# Patient Record
Sex: FEMALE | Race: WHITE | NOT HISPANIC OR LATINO | URBAN - METROPOLITAN AREA
[De-identification: names, ages, dates, MRNs, and addresses within clinical notes are randomized per-mention and may not be internally consistent; named-entity substitution may affect disease eponyms.]

---

## 2019-01-22 ENCOUNTER — EMERGENCY (EMERGENCY)
Facility: HOSPITAL | Age: 84
LOS: 1 days | Discharge: HOME | End: 2019-01-22
Attending: EMERGENCY MEDICINE
Payer: MEDICARE

## 2019-01-22 VITALS
RESPIRATION RATE: 18 BRPM | HEART RATE: 80 BPM | SYSTOLIC BLOOD PRESSURE: 120 MMHG | DIASTOLIC BLOOD PRESSURE: 64 MMHG | OXYGEN SATURATION: 97 % | TEMPERATURE: 98 F

## 2019-01-22 PROCEDURE — 74018 RADEX ABDOMEN 1 VIEW: CPT | Mod: 26

## 2019-01-22 PROCEDURE — 99283 EMERGENCY DEPT VISIT LOW MDM: CPT

## 2019-01-22 NOTE — ED ADULT TRIAGE NOTE - CHIEF COMPLAINT QUOTE
via ambulance sent from [facility] for peg replacement , peg tube leaking

## 2019-01-22 NOTE — ED ADULT NURSE NOTE - NSIMPLEMENTINTERV_GEN_ALL_ED
Implemented All Fall with Harm Risk Interventions:  [place] to call system. Call bell, personal items and telephone within reach. Instruct patient to call for assistance. Room bathroom lighting operational. Non-slip footwear when patient is off stretcher. Physically safe environment: no spills, clutter or unnecessary equipment. Stretcher in lowest position, wheels locked, appropriate side rails in place. Provide visual cue, wrist band, yellow gown, etc. Monitor gait and stability. Monitor for mental status changes and reorient to person, place, and time. Review medications for side effects contributing to fall risk. Reinforce activity limits and safety measures with patient and family. Provide visual clues: red socks.

## 2019-01-22 NOTE — ED ADULT NURSE NOTE - ED STAT RN HANDOFF DETAILS
Patient discharge back to [facility] as per MD order, [facility] ambulance here to pick patient up. Patient being transported via stretcher stable in no acute distress safety maintained.

## 2019-01-22 NOTE — ED ADULT NURSE NOTE - CHIEF COMPLAINT QUOTE
via ambulance sent from [facility] for peg replacement , peg tube leaking

## 2019-01-22 NOTE — ED PROVIDER NOTE - MUSCULOSKELETAL, MLM
Spine appears normal, range of motion is limited, rosette legs /Rt arm contracture, Lt heel- stage II ulcer

## 2019-01-22 NOTE — ED ADULT NURSE NOTE - NS PRO AD PATIENT TYPE
Medical Orders for Life-Sustaining Treatment (MOLST)/Do Not Resuscitate (DNR)/not on file/Health Care Proxy (HCP)

## 2019-01-24 ENCOUNTER — INPATIENT (INPATIENT)
Facility: HOSPITAL | Age: 84
LOS: 3 days | Discharge: NURSING, REHAB OR LONG-TERM CARE | DRG: 205 | End: 2019-01-28
Attending: INTERNAL MEDICINE | Admitting: INTERNAL MEDICINE
Payer: MEDICARE

## 2019-01-24 VITALS
HEART RATE: 84 BPM | WEIGHT: 179.9 LBS | HEIGHT: 62 IN | OXYGEN SATURATION: 96 % | RESPIRATION RATE: 20 BRPM | SYSTOLIC BLOOD PRESSURE: 100 MMHG | DIASTOLIC BLOOD PRESSURE: 70 MMHG

## 2019-01-24 DIAGNOSIS — Z29.9 ENCOUNTER FOR PROPHYLACTIC MEASURES, UNSPECIFIED: ICD-10-CM

## 2019-01-24 DIAGNOSIS — F03.90 UNSPECIFIED DEMENTIA WITHOUT BEHAVIORAL DISTURBANCE: ICD-10-CM

## 2019-01-24 DIAGNOSIS — E78.5 HYPERLIPIDEMIA, UNSPECIFIED: ICD-10-CM

## 2019-01-24 DIAGNOSIS — J18.9 PNEUMONIA, UNSPECIFIED ORGANISM: ICD-10-CM

## 2019-01-24 DIAGNOSIS — J96.00 ACUTE RESPIRATORY FAILURE, UNSPECIFIED WHETHER WITH HYPOXIA OR HYPERCAPNIA: ICD-10-CM

## 2019-01-24 PROBLEM — I10 ESSENTIAL (PRIMARY) HYPERTENSION: Chronic | Status: ACTIVE | Noted: 2019-01-22

## 2019-01-24 LAB
ALBUMIN SERPL ELPH-MCNC: 2.2 G/DL — LOW (ref 3.5–5)
ALP SERPL-CCNC: 121 U/L — HIGH (ref 40–120)
ALT FLD-CCNC: 34 U/L DA — SIGNIFICANT CHANGE UP (ref 10–60)
ANION GAP SERPL CALC-SCNC: 8 MMOL/L — SIGNIFICANT CHANGE UP (ref 5–17)
AST SERPL-CCNC: 30 U/L — SIGNIFICANT CHANGE UP (ref 10–40)
BASE EXCESS BLDV CALC-SCNC: 5.8 MMOL/L — HIGH (ref -2–2)
BASOPHILS # BLD AUTO: 0.1 K/UL — SIGNIFICANT CHANGE UP (ref 0–0.2)
BASOPHILS NFR BLD AUTO: 0.6 % — SIGNIFICANT CHANGE UP (ref 0–2)
BILIRUB SERPL-MCNC: 0.5 MG/DL — SIGNIFICANT CHANGE UP (ref 0.2–1.2)
BUN SERPL-MCNC: 35 MG/DL — HIGH (ref 7–18)
CALCIUM SERPL-MCNC: 7.8 MG/DL — LOW (ref 8.4–10.5)
CHLORIDE SERPL-SCNC: 112 MMOL/L — HIGH (ref 96–108)
CK MB BLD-MCNC: 1.1 % — SIGNIFICANT CHANGE UP (ref 0–3.5)
CK MB CFR SERPL CALC: 2.2 NG/ML — SIGNIFICANT CHANGE UP (ref 0–3.6)
CK SERPL-CCNC: 203 U/L — SIGNIFICANT CHANGE UP (ref 21–215)
CO2 SERPL-SCNC: 25 MMOL/L — SIGNIFICANT CHANGE UP (ref 22–31)
CREAT SERPL-MCNC: 0.53 MG/DL — SIGNIFICANT CHANGE UP (ref 0.5–1.3)
EOSINOPHIL # BLD AUTO: 0 K/UL — SIGNIFICANT CHANGE UP (ref 0–0.5)
EOSINOPHIL NFR BLD AUTO: 0.1 % — SIGNIFICANT CHANGE UP (ref 0–6)
FLU A RESULT: SIGNIFICANT CHANGE UP
FLU A RESULT: SIGNIFICANT CHANGE UP
FLUAV AG NPH QL: SIGNIFICANT CHANGE UP
FLUBV AG NPH QL: SIGNIFICANT CHANGE UP
GLUCOSE SERPL-MCNC: 104 MG/DL — HIGH (ref 70–99)
HCO3 BLDV-SCNC: 30 MMOL/L — HIGH (ref 21–29)
HCT VFR BLD CALC: 36.3 % — SIGNIFICANT CHANGE UP (ref 34.5–45)
HGB BLD-MCNC: 11.6 G/DL — SIGNIFICANT CHANGE UP (ref 11.5–15.5)
HOROWITZ INDEX BLDV+IHG-RTO: 21 — SIGNIFICANT CHANGE UP
LYMPHOCYTES # BLD AUTO: 1.2 K/UL — SIGNIFICANT CHANGE UP (ref 1–3.3)
LYMPHOCYTES # BLD AUTO: 9.3 % — LOW (ref 13–44)
MCHC RBC-ENTMCNC: 29.5 PG — SIGNIFICANT CHANGE UP (ref 27–34)
MCHC RBC-ENTMCNC: 31.8 GM/DL — LOW (ref 32–36)
MCV RBC AUTO: 92.7 FL — SIGNIFICANT CHANGE UP (ref 80–100)
MONOCYTES # BLD AUTO: 1.1 K/UL — HIGH (ref 0–0.9)
MONOCYTES NFR BLD AUTO: 8.6 % — SIGNIFICANT CHANGE UP (ref 2–14)
NEUTROPHILS # BLD AUTO: 10.2 K/UL — HIGH (ref 1.8–7.4)
NEUTROPHILS NFR BLD AUTO: 81.5 % — HIGH (ref 43–77)
PCO2 BLDV: 41 MMHG — SIGNIFICANT CHANGE UP (ref 35–50)
PH BLDV: 7.47 — HIGH (ref 7.35–7.45)
PLATELET # BLD AUTO: 244 K/UL — SIGNIFICANT CHANGE UP (ref 150–400)
PO2 BLDV: 89 MMHG — HIGH (ref 25–45)
POTASSIUM SERPL-MCNC: 4.2 MMOL/L — SIGNIFICANT CHANGE UP (ref 3.5–5.3)
POTASSIUM SERPL-SCNC: 4.2 MMOL/L — SIGNIFICANT CHANGE UP (ref 3.5–5.3)
PROT SERPL-MCNC: 7 G/DL — SIGNIFICANT CHANGE UP (ref 6–8.3)
RBC # BLD: 3.92 M/UL — SIGNIFICANT CHANGE UP (ref 3.8–5.2)
RBC # FLD: 14.1 % — SIGNIFICANT CHANGE UP (ref 10.3–14.5)
RSV RESULT: SIGNIFICANT CHANGE UP
RSV RNA RESP QL NAA+PROBE: SIGNIFICANT CHANGE UP
SAO2 % BLDV: 97 % — HIGH (ref 67–88)
SODIUM SERPL-SCNC: 145 MMOL/L — SIGNIFICANT CHANGE UP (ref 135–145)
TROPONIN I SERPL-MCNC: 0.03 NG/ML — SIGNIFICANT CHANGE UP (ref 0–0.04)
WBC # BLD: 12.5 K/UL — HIGH (ref 3.8–10.5)
WBC # FLD AUTO: 12.5 K/UL — HIGH (ref 3.8–10.5)

## 2019-01-24 PROCEDURE — 99285 EMERGENCY DEPT VISIT HI MDM: CPT

## 2019-01-24 PROCEDURE — 93010 ELECTROCARDIOGRAM REPORT: CPT

## 2019-01-24 PROCEDURE — 71045 X-RAY EXAM CHEST 1 VIEW: CPT | Mod: 26

## 2019-01-24 RX ORDER — PANTOPRAZOLE SODIUM 20 MG/1
40 TABLET, DELAYED RELEASE ORAL
Qty: 0 | Refills: 0 | Status: DISCONTINUED | OUTPATIENT
Start: 2019-01-24 | End: 2019-01-28

## 2019-01-24 RX ORDER — CITALOPRAM 10 MG/1
10 TABLET, FILM COATED ORAL DAILY
Qty: 0 | Refills: 0 | Status: DISCONTINUED | OUTPATIENT
Start: 2019-01-24 | End: 2019-01-28

## 2019-01-24 RX ORDER — PIPERACILLIN AND TAZOBACTAM 4; .5 G/20ML; G/20ML
3.38 INJECTION, POWDER, LYOPHILIZED, FOR SOLUTION INTRAVENOUS EVERY 8 HOURS
Qty: 0 | Refills: 0 | Status: DISCONTINUED | OUTPATIENT
Start: 2019-01-24 | End: 2019-01-28

## 2019-01-24 RX ORDER — MEMANTINE HYDROCHLORIDE 10 MG/1
10 TABLET ORAL
Qty: 0 | Refills: 0 | Status: DISCONTINUED | OUTPATIENT
Start: 2019-01-24 | End: 2019-01-28

## 2019-01-24 RX ORDER — SIMVASTATIN 20 MG/1
20 TABLET, FILM COATED ORAL AT BEDTIME
Qty: 0 | Refills: 0 | Status: DISCONTINUED | OUTPATIENT
Start: 2019-01-24 | End: 2019-01-28

## 2019-01-24 RX ORDER — AZITHROMYCIN 500 MG/1
500 TABLET, FILM COATED ORAL ONCE
Qty: 0 | Refills: 0 | Status: COMPLETED | OUTPATIENT
Start: 2019-01-24 | End: 2019-01-24

## 2019-01-24 RX ORDER — ENOXAPARIN SODIUM 100 MG/ML
40 INJECTION SUBCUTANEOUS DAILY
Qty: 0 | Refills: 0 | Status: DISCONTINUED | OUTPATIENT
Start: 2019-01-24 | End: 2019-01-28

## 2019-01-24 RX ORDER — PIPERACILLIN AND TAZOBACTAM 4; .5 G/20ML; G/20ML
3.38 INJECTION, POWDER, LYOPHILIZED, FOR SOLUTION INTRAVENOUS ONCE
Qty: 0 | Refills: 0 | Status: DISCONTINUED | OUTPATIENT
Start: 2019-01-24 | End: 2019-01-28

## 2019-01-24 RX ADMIN — SIMVASTATIN 20 MILLIGRAM(S): 20 TABLET, FILM COATED ORAL at 23:51

## 2019-01-24 RX ADMIN — PIPERACILLIN AND TAZOBACTAM 25 GRAM(S): 4; .5 INJECTION, POWDER, LYOPHILIZED, FOR SOLUTION INTRAVENOUS at 23:51

## 2019-01-24 RX ADMIN — AZITHROMYCIN 250 MILLIGRAM(S): 500 TABLET, FILM COATED ORAL at 18:59

## 2019-01-24 NOTE — H&P ADULT - ASSESSMENT
89 yr old F from [facility] bedbound with advanced dementia, Htn, gastric ulcer is sent from NH due to hypoxia of 80s, dyspnea and concern of PNA.

## 2019-01-24 NOTE — H&P ADULT - NSHPPHYSICALEXAM_GEN_ALL_CORE
ICU Vital Signs Last 24 Hrs  T(C): 37.1 (24 Jan 2019 16:54), Max: 37.6 (24 Jan 2019 12:43)  T(F): 98.7 (24 Jan 2019 16:54), Max: 99.6 (24 Jan 2019 12:43)  HR: 88 (24 Jan 2019 16:54) (84 - 88)  BP: 161/79 (24 Jan 2019 16:54) (100/70 - 161/79)  BP(mean): --  ABP: --  ABP(mean): --  RR: 18 (24 Jan 2019 16:54) (18 - 20)  SpO2: 99% (24 Jan 2019 16:54) (96% - 99%)    PHYSICAL EXAM:  GENERAL: in minimal distress, increased work of breathing  HEAD:  Atraumatic, Normocephalic  EYES:  conjunctiva and sclera clear  NECK: Supple, No JVD, Normal thyroid  CHEST/LUNG: Clear to auscultation. Clear to percussion bilaterally; No rales, rhonchi, wheezing, or rubs  HEART: Regular rate and rhythm; No murmurs, rubs, or gallops  ABDOMEN: Soft, Nontender, Nondistended; Bowel sounds present  NERVOUS SYSTEM:  Alert & Oriented X 0    EXTREMITIES:  2+ Peripheral Pulses, No clubbing, cyanosis, or edema  SKIN: warm dry

## 2019-01-24 NOTE — ED PROVIDER NOTE - OBJECTIVE STATEMENT
SOB SOB snet from nursing home for SOB   no fever no chills  has had a cough   also somewhat more lethargic

## 2019-01-24 NOTE — ED ADULT NURSE NOTE - NSIMPLEMENTINTERV_GEN_ALL_ED
Implemented All Universal Safety Interventions:  [place] to call system. Call bell, personal items and telephone within reach. Instruct patient to call for assistance. Room bathroom lighting operational. Non-slip footwear when patient is off stretcher. Physically safe environment: no spills, clutter or unnecessary equipment. Stretcher in lowest position, wheels locked, appropriate side rails in place.

## 2019-01-24 NOTE — ED ADULT NURSE NOTE - OBJECTIVE STATEMENT
pt is sent form [facility] home for increaded WOB - 02 sat is wnl on 3L n/c

## 2019-01-24 NOTE — H&P ADULT - HISTORY OF PRESENT ILLNESS
89 yr old F from [facility] bedbound with advanced dementia., 89 yr old F from [facility] bedbound with advanced dementia, Htn, gastric ulcer is sent from NH due to hypoxia of 80s, dyspnea and concern of PNA. Patient was recently came to [facility] ED for Peg tube change and was discharged. Further hx is limited as patient is AXO=0.    In ED, patient's vital signs were stable, labs were remarkable for WBC:12.5, CXR shows "Nonspecific prominence of the right hilum and mass/lymphadenopathy, no pleural effusion, Atelectasis right lung base. Calcified granuloma left lung base. No pleural effusion. Mild elevation right hemidiaphragm." Patient was saturating 95% on 2L nasal cannula    Goals of care: DNR/ DNI. HCP is ok with Central line and IV pressors.

## 2019-01-24 NOTE — H&P ADULT - PROBLEM SELECTOR PLAN 1
p/w hypoxia to 85%, increased to 95% on nasal cannula 2L likley secondary to Pneumonia/ atelectasis   CXR shows Nonspecific prominence of the right hilum and mass/lymphadenopathy not excluded, does not show any infiltrate  c/w nasal cannula oxygen   will start zosyn for concerns of PNA  F/u Blood cultures

## 2019-01-24 NOTE — ED ADULT NURSE NOTE - ED STAT RN HANDOFF DETAILS 2
received pt.in  bed at 1910 pt. is  awake and responsive. denies pain, transfer to  401 C report given to  marlon [email or web address]   in distress

## 2019-01-25 DIAGNOSIS — N39.0 URINARY TRACT INFECTION, SITE NOT SPECIFIED: ICD-10-CM

## 2019-01-25 LAB
ANION GAP SERPL CALC-SCNC: 6 MMOL/L — SIGNIFICANT CHANGE UP (ref 5–17)
APPEARANCE UR: CLEAR — SIGNIFICANT CHANGE UP
BASOPHILS # BLD AUTO: 0.1 K/UL — SIGNIFICANT CHANGE UP (ref 0–0.2)
BASOPHILS NFR BLD AUTO: 0.5 % — SIGNIFICANT CHANGE UP (ref 0–2)
BILIRUB UR-MCNC: NEGATIVE — SIGNIFICANT CHANGE UP
BUN SERPL-MCNC: 34 MG/DL — HIGH (ref 7–18)
CALCIUM SERPL-MCNC: 8.3 MG/DL — LOW (ref 8.4–10.5)
CHLORIDE SERPL-SCNC: 111 MMOL/L — HIGH (ref 96–108)
CHOLEST SERPL-MCNC: 121 MG/DL — SIGNIFICANT CHANGE UP (ref 10–199)
CO2 SERPL-SCNC: 30 MMOL/L — SIGNIFICANT CHANGE UP (ref 22–31)
COLOR SPEC: YELLOW — SIGNIFICANT CHANGE UP
CREAT SERPL-MCNC: 0.59 MG/DL — SIGNIFICANT CHANGE UP (ref 0.5–1.3)
DIFF PNL FLD: ABNORMAL
EOSINOPHIL # BLD AUTO: 0 K/UL — SIGNIFICANT CHANGE UP (ref 0–0.5)
EOSINOPHIL NFR BLD AUTO: 0.1 % — SIGNIFICANT CHANGE UP (ref 0–6)
FOLATE SERPL-MCNC: >20 NG/ML — SIGNIFICANT CHANGE UP
GLUCOSE SERPL-MCNC: 115 MG/DL — HIGH (ref 70–99)
GLUCOSE UR QL: NEGATIVE — SIGNIFICANT CHANGE UP
HBA1C BLD-MCNC: 5.9 % — HIGH (ref 4–5.6)
HCT VFR BLD CALC: 36.1 % — SIGNIFICANT CHANGE UP (ref 34.5–45)
HDLC SERPL-MCNC: 35 MG/DL — LOW
HGB BLD-MCNC: 11.2 G/DL — LOW (ref 11.5–15.5)
KETONES UR-MCNC: NEGATIVE — SIGNIFICANT CHANGE UP
LEUKOCYTE ESTERASE UR-ACNC: ABNORMAL
LIPID PNL WITH DIRECT LDL SERPL: 67 MG/DL — SIGNIFICANT CHANGE UP
LYMPHOCYTES # BLD AUTO: 1.3 K/UL — SIGNIFICANT CHANGE UP (ref 1–3.3)
LYMPHOCYTES # BLD AUTO: 11.1 % — LOW (ref 13–44)
MAGNESIUM SERPL-MCNC: 2.4 MG/DL — SIGNIFICANT CHANGE UP (ref 1.6–2.6)
MCHC RBC-ENTMCNC: 29.7 PG — SIGNIFICANT CHANGE UP (ref 27–34)
MCHC RBC-ENTMCNC: 31 GM/DL — LOW (ref 32–36)
MCV RBC AUTO: 95.8 FL — SIGNIFICANT CHANGE UP (ref 80–100)
MONOCYTES # BLD AUTO: 1 K/UL — HIGH (ref 0–0.9)
MONOCYTES NFR BLD AUTO: 8.4 % — SIGNIFICANT CHANGE UP (ref 2–14)
NEUTROPHILS # BLD AUTO: 9.5 K/UL — HIGH (ref 1.8–7.4)
NEUTROPHILS NFR BLD AUTO: 79.9 % — HIGH (ref 43–77)
NITRITE UR-MCNC: NEGATIVE — SIGNIFICANT CHANGE UP
PH UR: 6 — SIGNIFICANT CHANGE UP (ref 5–8)
PHOSPHATE SERPL-MCNC: 4.1 MG/DL — SIGNIFICANT CHANGE UP (ref 2.5–4.5)
PLATELET # BLD AUTO: 235 K/UL — SIGNIFICANT CHANGE UP (ref 150–400)
POTASSIUM SERPL-MCNC: 4 MMOL/L — SIGNIFICANT CHANGE UP (ref 3.5–5.3)
POTASSIUM SERPL-SCNC: 4 MMOL/L — SIGNIFICANT CHANGE UP (ref 3.5–5.3)
PROT UR-MCNC: 30 MG/DL
RBC # BLD: 3.77 M/UL — LOW (ref 3.8–5.2)
RBC # FLD: 13.3 % — SIGNIFICANT CHANGE UP (ref 10.3–14.5)
SODIUM SERPL-SCNC: 147 MMOL/L — HIGH (ref 135–145)
SP GR SPEC: 1.01 — SIGNIFICANT CHANGE UP (ref 1.01–1.02)
TOTAL CHOLESTEROL/HDL RATIO MEASUREMENT: 3.5 RATIO — SIGNIFICANT CHANGE UP (ref 3.3–7.1)
TRIGL SERPL-MCNC: 96 MG/DL — SIGNIFICANT CHANGE UP (ref 10–149)
TSH SERPL-MCNC: 3.2 UU/ML — SIGNIFICANT CHANGE UP (ref 0.34–4.82)
UROBILINOGEN FLD QL: NEGATIVE — SIGNIFICANT CHANGE UP
VIT B12 SERPL-MCNC: 1041 PG/ML — SIGNIFICANT CHANGE UP (ref 232–1245)
WBC # BLD: 11.9 K/UL — HIGH (ref 3.8–10.5)
WBC # FLD AUTO: 11.9 K/UL — HIGH (ref 3.8–10.5)

## 2019-01-25 PROCEDURE — 71250 CT THORAX DX C-: CPT | Mod: 26

## 2019-01-25 RX ADMIN — PIPERACILLIN AND TAZOBACTAM 25 GRAM(S): 4; .5 INJECTION, POWDER, LYOPHILIZED, FOR SOLUTION INTRAVENOUS at 21:36

## 2019-01-25 RX ADMIN — PANTOPRAZOLE SODIUM 40 MILLIGRAM(S): 20 TABLET, DELAYED RELEASE ORAL at 05:58

## 2019-01-25 RX ADMIN — CITALOPRAM 10 MILLIGRAM(S): 10 TABLET, FILM COATED ORAL at 13:03

## 2019-01-25 RX ADMIN — PIPERACILLIN AND TAZOBACTAM 25 GRAM(S): 4; .5 INJECTION, POWDER, LYOPHILIZED, FOR SOLUTION INTRAVENOUS at 13:56

## 2019-01-25 RX ADMIN — SIMVASTATIN 20 MILLIGRAM(S): 20 TABLET, FILM COATED ORAL at 21:36

## 2019-01-25 RX ADMIN — MEMANTINE HYDROCHLORIDE 10 MILLIGRAM(S): 10 TABLET ORAL at 18:27

## 2019-01-25 RX ADMIN — MEMANTINE HYDROCHLORIDE 10 MILLIGRAM(S): 10 TABLET ORAL at 05:58

## 2019-01-25 RX ADMIN — ENOXAPARIN SODIUM 40 MILLIGRAM(S): 100 INJECTION SUBCUTANEOUS at 13:03

## 2019-01-25 RX ADMIN — PIPERACILLIN AND TAZOBACTAM 25 GRAM(S): 4; .5 INJECTION, POWDER, LYOPHILIZED, FOR SOLUTION INTRAVENOUS at 05:58

## 2019-01-25 NOTE — ADVANCED PRACTICE NURSE CONSULT - ASSESSMENT
This is a 89yr old female patient admitted for Pneumonia, presenting with the following:  -There is a Stage 1 Pressure Injury to the Coccyx, as evident by non-blanchable erythema  -There is a Stage 3 Pressure injury to the R. Heel (4cm x 4cm) with pink tissue and drainage

## 2019-01-25 NOTE — PROGRESS NOTE ADULT - ASSESSMENT
pt was brought in sob  and low grade fever    seen and examined  vs stable afebrile saturating well on 2 lnc  lungs clinically clear    heart nml abd soft bs nml non tender peg in place  cns very confused  ( chronic)   ct chest negative  blood, urine  cxs pending  advanced dementia  bed bound , peg tube  poor prognosis

## 2019-01-25 NOTE — ADVANCED PRACTICE NURSE CONSULT - RECOMMEDATIONS
-Clean all wounds with normal saline and apply skin prep to the surrounding skin  -Apply Calcium Alginate (Aquacel) to the R. heel wound bed and cover with a Foam dressing Q 72hrs PRN  -Apply a Foam dressing to the Coccyx Q 72hrs PRN  -Elevate/float the patients heels using heel protectors and reposition the patient Q 2hrs using wedges or pillows

## 2019-01-25 NOTE — PATIENT PROFILE ADULT - LAST BOWEL MOVEMENT DATE
Occupational Therapy INPATIENT REHABILITATION Initial Evaluation:     Date: 6/2/2018     Patient is a 71 year old female admitted to Inpatient Rehabilitation Unit at [facility] for acute left frontal parasaggital hemorrhage and right frontal encephalomalacia with subacute right frontal parasagittal hematoma. Patient previously lived alone and was independent with self-cares and mobility. Patient enjoys caring for home including cooking, cleaning and gardening.     ASSESSMENT:   Patient presents to occupational therapy below baseline level of functioning with ADLs and mobility. Patient is demonstrating decreased strength, balance deficits, diminished safety awareness, poor coordination, decreased activity tolerance, postural problems, decreased endurance which is limiting the completion of all ADLs and all functional mobility at this time.  Further skilled occupational therapy is required to address these limitations in attempt to maximize the patient's independence.       Focus of today's therapy session:   Completion of OT evaluation,  ADL training   See below for further details.    Treatment Plan for Next Session:   AM: upper / lower body dressing   PM: toilet transfer / toileting     Precautions:  Fall Risk   Activity: As tolerated and up with assist  Rodriguez Fall Scale Score: 75  Alarms:  Bed alarm activated at end of session    SUBJECTIVE:   Pt. reported agreeable to therapy  Pt's personal goal for therapy: return home    Cognition: Alert and Oriented x4    Pain: Pain Assessment: Within defined limits (06/02/18 0915)  Comfort/Function (Pain) SCORE at Rest: 0 (06/02/18 0915)  Comfort/Function (Pain) SCORE with Activity: 0 (06/02/18 0915)     ADLs:   Eating:  Set-up Assistance  Oral Hygiene:  Set-up Assistance  Bathing:  Maximal Assistance (Max)  Upper Extremity Dressing:  Minimal Assistance (Min)  Lower Extremity Dressing:  Maximal Assistance (Max)  Footwear:  Total Assistance   Patient requiring assistance with  grooming / feeding as limited by right upper extremity strength deficits. Assistance required for bathing as patient limited by impaired sitting balance, decreased safety awareness and right upper extremity strength deficits. Patient completing full body dressing seated on edge of bed, patient requiring minimal assistance for dynamic sitting balance as patient demonstrates right LE extensor tone and leans posteriorly, requiring verbal / tactile cues to correct. Would recommend direct supervision on unsupported surfaces including toilet as patient demonstrates balance deficits as well as decreased ability to flex right knee to support on ground surface.        MOBILITY/EXERCISE:    Bed:   Sit to Supine:  Maximal Assistance (Max)    Transfers:   Sit to Stand:  Moderate Assistance (Mod)  Stand to Sit:  Moderate Assistance (Mod)  Shower:  Walk-in:  side stepping with Moderate Assistance (Mod), of 2  Device Used:  gait belt and 2 wheeled walker  Reason for Assistance:  weakness, verbal cues for hand placement, sequencing, manual cues for right UE placement, unsteadiness    Positioning from writer required for hand placement on walker     ADL Functional Mobility/Ambulation:  Minimal assistance of two to ambulate to bathroom as patient demo's RLE weakness, requiring verbal / tactile cues for advancing right lower extremity with ambulation and verbal cues for sequence     Balance:  Static sitting: Supervision (Supv)  Dynamic sitting: Minimal Assistance (Min)  Static standing:  Minimal Assistance (Min)  Dynamic standing: Moderate Assistance (Mod)    Exercises:  Not addressed this session      Initial IRP Reporting:   Activities of Daily Living:        Eating:  Set-up Assistance       Grooming:  Set-up Assistance       Oral Hygiene:  Set-up Assistance       Toileting:  Patient Refused       Toilet Transfer: Patient Refused       Bathing:  Maximal Assistance (Max)       Walk-in Transfer: Total Assistance       Upper Extremity  Dressing:  Minimal Assistance (Min)       Lower Extremity Dressing:  Maximal Assistance (Max)       Footwear:  Total Assistance     OBSERVATION:   Edema:   none    Vital Signs:   Vital signs stable throughout therapy session    UE ROM (degrees):  Functional Upper Extremity Range of Motion:     Left Right   Date     Place hand on opposite shoulder Yes No   Touch top of head Yes No   Place hand behind neck Yes No   Place hand behind back Yes No   Over head reach Yes No   Comments: Right hand dominant    Strength (out of 5, in available AROM):  UE:     Left  Right    Initial Initial    Shoulder Flexion  4/5 0/5   Elbow Flexion  4/5 1/5   Elbow Extension 4/5 1/5   *Flexor tone observed with elbow flexion / extension    Neurological:  Coordination: impaired RUE  Sensation: intact  Tone: impaired RUE - elbow flexor tone with ROM  Vision: intact  wears glasses for reading    Activity Tolerance:   no limits in patient's ability to participate in session     EDUCATION:    Learner: patient  Teaching Content: Bed Mobility, Transfers, Safety Awareness, Therapy Plan of Care, Dressing Techniques, Bathing Techniques and Energy Conservation  Please reference the patient education activity for further information regarding the patient's learning assessment.     GOALS:     Review Date: 6/9/18  1. Patient's level of assist with grooming will be Modified independent.  2. Patient's level of assist with bathing will be Supervision.  3. Patient's level of assist with UE dressing will be Modified independent.  4. Patient's level of assist with LE dressing will be Modified independent.  5. Patient's level of assist with toileting will be Modified independent.  6. Patient's level of assist with toilet transfers will be Modified independent.  7. Patient's level of assist with tub/shower transfers will be Supervision.    Rehab potential is good due to the following positive factors motivation level, response to initial treatment; and is  impacted by the following negative factors lack of family support         DISCHARGE RECOMMENDATIONS:   After today's session this therapist is recommending the following location for optimal discharge:    Recommendations for Discharge: OT: Home, Home therapy  OT Identified Barriers to Discharge: lives alone  Recommendation for Discharge: PT: Home, Home therapy       Equipment for discharge: to be determined - continuing to assess needs at this time     PLAN OF CARE:    OT Frequency: Twice a day, 5 days/week  Duration: 3 weeks   Treatment Interventions: ADL retraining, Functional transfer training, UE strengthening/ROM, Endurance training, Patient/Family training, Equipment eval/education, Compensatory technique education, Neuro muscular reeducation, Fine motor coordination activities    The plan of care and goals were established with the patient and she is in agreement.      Recorded diagnosis/complaint at time of admission:  There are no active hospital problems to display for this patient.    Co-morbidities:   Patient Active Problem List   Diagnosis   • Hemorrhagic stroke (CMS/HCC)   • Hypokalemia     Task Modification: clinical decision making of moderate complexity, minimal to moderate task modification    OT Time Spent: 60 minutes (06/02/18 3001)   25-Jan-2019

## 2019-01-25 NOTE — PROGRESS NOTE ADULT - ASSESSMENT
89 yr old F from [facility] bedbound with advanced dementia, Htn, gastric ulcer is sent from NH due to hypoxia of 80s, dyspnea and concern of PNA.    +UTI, CT chest negative for pneumonia

## 2019-01-26 LAB
ALBUMIN SERPL ELPH-MCNC: 2.3 G/DL — LOW (ref 3.5–5)
ALP SERPL-CCNC: 101 U/L — SIGNIFICANT CHANGE UP (ref 40–120)
ALT FLD-CCNC: 31 U/L DA — SIGNIFICANT CHANGE UP (ref 10–60)
ANION GAP SERPL CALC-SCNC: 7 MMOL/L — SIGNIFICANT CHANGE UP (ref 5–17)
AST SERPL-CCNC: 30 U/L — SIGNIFICANT CHANGE UP (ref 10–40)
BILIRUB SERPL-MCNC: 0.5 MG/DL — SIGNIFICANT CHANGE UP (ref 0.2–1.2)
BUN SERPL-MCNC: 26 MG/DL — HIGH (ref 7–18)
CALCIUM SERPL-MCNC: 8.2 MG/DL — LOW (ref 8.4–10.5)
CHLORIDE SERPL-SCNC: 109 MMOL/L — HIGH (ref 96–108)
CO2 SERPL-SCNC: 29 MMOL/L — SIGNIFICANT CHANGE UP (ref 22–31)
CREAT SERPL-MCNC: 0.49 MG/DL — LOW (ref 0.5–1.3)
GLUCOSE SERPL-MCNC: 117 MG/DL — HIGH (ref 70–99)
HCT VFR BLD CALC: 35.6 % — SIGNIFICANT CHANGE UP (ref 34.5–45)
HGB BLD-MCNC: 11.3 G/DL — LOW (ref 11.5–15.5)
MCHC RBC-ENTMCNC: 30 PG — SIGNIFICANT CHANGE UP (ref 27–34)
MCHC RBC-ENTMCNC: 31.8 GM/DL — LOW (ref 32–36)
MCV RBC AUTO: 94.6 FL — SIGNIFICANT CHANGE UP (ref 80–100)
PLATELET # BLD AUTO: 217 K/UL — SIGNIFICANT CHANGE UP (ref 150–400)
POTASSIUM SERPL-MCNC: 3.6 MMOL/L — SIGNIFICANT CHANGE UP (ref 3.5–5.3)
POTASSIUM SERPL-SCNC: 3.6 MMOL/L — SIGNIFICANT CHANGE UP (ref 3.5–5.3)
PROT SERPL-MCNC: 6.6 G/DL — SIGNIFICANT CHANGE UP (ref 6–8.3)
RBC # BLD: 3.76 M/UL — LOW (ref 3.8–5.2)
RBC # FLD: 13.2 % — SIGNIFICANT CHANGE UP (ref 10.3–14.5)
SODIUM SERPL-SCNC: 145 MMOL/L — SIGNIFICANT CHANGE UP (ref 135–145)
WBC # BLD: 10.5 K/UL — SIGNIFICANT CHANGE UP (ref 3.8–10.5)
WBC # FLD AUTO: 10.5 K/UL — SIGNIFICANT CHANGE UP (ref 3.8–10.5)

## 2019-01-26 RX ORDER — AMLODIPINE BESYLATE 2.5 MG/1
2.5 TABLET ORAL ONCE
Qty: 0 | Refills: 0 | Status: COMPLETED | OUTPATIENT
Start: 2019-01-26 | End: 2019-01-26

## 2019-01-26 RX ADMIN — PIPERACILLIN AND TAZOBACTAM 25 GRAM(S): 4; .5 INJECTION, POWDER, LYOPHILIZED, FOR SOLUTION INTRAVENOUS at 13:45

## 2019-01-26 RX ADMIN — CITALOPRAM 10 MILLIGRAM(S): 10 TABLET, FILM COATED ORAL at 12:49

## 2019-01-26 RX ADMIN — MEMANTINE HYDROCHLORIDE 10 MILLIGRAM(S): 10 TABLET ORAL at 05:50

## 2019-01-26 RX ADMIN — ENOXAPARIN SODIUM 40 MILLIGRAM(S): 100 INJECTION SUBCUTANEOUS at 12:49

## 2019-01-26 RX ADMIN — MEMANTINE HYDROCHLORIDE 10 MILLIGRAM(S): 10 TABLET ORAL at 17:48

## 2019-01-26 RX ADMIN — PIPERACILLIN AND TAZOBACTAM 25 GRAM(S): 4; .5 INJECTION, POWDER, LYOPHILIZED, FOR SOLUTION INTRAVENOUS at 21:15

## 2019-01-26 RX ADMIN — SIMVASTATIN 20 MILLIGRAM(S): 20 TABLET, FILM COATED ORAL at 21:16

## 2019-01-26 RX ADMIN — PANTOPRAZOLE SODIUM 40 MILLIGRAM(S): 20 TABLET, DELAYED RELEASE ORAL at 05:50

## 2019-01-26 RX ADMIN — AMLODIPINE BESYLATE 2.5 MILLIGRAM(S): 2.5 TABLET ORAL at 07:59

## 2019-01-26 RX ADMIN — PIPERACILLIN AND TAZOBACTAM 25 GRAM(S): 4; .5 INJECTION, POWDER, LYOPHILIZED, FOR SOLUTION INTRAVENOUS at 05:50

## 2019-01-26 NOTE — DIETITIAN INITIAL EVALUATION ADULT. - OTHER INFO
Pt visited. Pt is from NH on TF Jevity  1.2 1680 calories.   Pt is On Jevity 1.5 @ 30 ml/hr . Tf Tolerated.. Pt w stage 3 @ R heel, Stage 1 @ Coccyx.

## 2019-01-26 NOTE — DIETITIAN INITIAL EVALUATION ADULT. - MD RECOMMEND
other/Jevity 1.5 initial Goal rate @ 50 ml/hr x 16 hr as  tolerated ( 800 ml,1200 calories, Protein 51 gms, free water 608 ml/day.+ Prosource 1 pkt /day ( 15 gms of Protein, 60 calories,)

## 2019-01-27 LAB
CULTURE RESULTS: NO GROWTH — SIGNIFICANT CHANGE UP
SPECIMEN SOURCE: SIGNIFICANT CHANGE UP

## 2019-01-27 RX ADMIN — MEMANTINE HYDROCHLORIDE 10 MILLIGRAM(S): 10 TABLET ORAL at 17:53

## 2019-01-27 RX ADMIN — PIPERACILLIN AND TAZOBACTAM 25 GRAM(S): 4; .5 INJECTION, POWDER, LYOPHILIZED, FOR SOLUTION INTRAVENOUS at 05:12

## 2019-01-27 RX ADMIN — CITALOPRAM 10 MILLIGRAM(S): 10 TABLET, FILM COATED ORAL at 12:51

## 2019-01-27 RX ADMIN — ENOXAPARIN SODIUM 40 MILLIGRAM(S): 100 INJECTION SUBCUTANEOUS at 12:51

## 2019-01-27 RX ADMIN — PIPERACILLIN AND TAZOBACTAM 25 GRAM(S): 4; .5 INJECTION, POWDER, LYOPHILIZED, FOR SOLUTION INTRAVENOUS at 13:00

## 2019-01-27 RX ADMIN — PANTOPRAZOLE SODIUM 40 MILLIGRAM(S): 20 TABLET, DELAYED RELEASE ORAL at 07:02

## 2019-01-27 RX ADMIN — MEMANTINE HYDROCHLORIDE 10 MILLIGRAM(S): 10 TABLET ORAL at 05:12

## 2019-01-27 RX ADMIN — PIPERACILLIN AND TAZOBACTAM 25 GRAM(S): 4; .5 INJECTION, POWDER, LYOPHILIZED, FOR SOLUTION INTRAVENOUS at 21:06

## 2019-01-27 RX ADMIN — SIMVASTATIN 20 MILLIGRAM(S): 20 TABLET, FILM COATED ORAL at 21:05

## 2019-01-27 NOTE — PROGRESS NOTE ADULT - ASSESSMENT
_________________________________________________________________________________________  ========>>  M E D I C A L   A T T E N D I N G    F O L L O W  U P  N O T E  <<=========  -----------------------------------------------------------------------------------------------------    - Patient seen and examined by me approximately sixty minutes ago.   - In summary,  MILTON GUIDRY is a 89y year old woman who originally presented with SOB  - Patient today overall doing ok, comfortable, tolerating PEG feeds, no other events noted otherwise     ==================>> REVIEW OF SYSTEM <<=================    limited ROS, pt not verbalizing much    ==================>> PHYSICAL EXAM <<=================    GEN: Alert, awake , NAD , comfortable  HEENT: NCAT, PERRL, mucosa dry   Neck: supple , no JVD  CVS: S1S2 , regular , No M/R/G appreciated  PULM: CTA B/L  ABD.: soft. non tender, non distended,  bowel sounds present, PEG in place   Extrem: intact pulses , no edema   PSYCH : normal mood,  not anxious      ==================>> MEDICATIONS <<====================    MEDICATIONS  (STANDING):  citalopram 10 milliGRAM(s) Oral daily  enoxaparin Injectable 40 milliGRAM(s) SubCutaneous daily  memantine 10 milliGRAM(s) Oral two times a day  pantoprazole    Tablet 40 milliGRAM(s) Oral before breakfast  piperacillin/tazobactam IVPB. 3.375 Gram(s) IV Intermittent every 8 hours  piperacillin/tazobactam IVPB. 3.375 Gram(s) IV Intermittent once  simvastatin 20 milliGRAM(s) Oral at bedtime    ==================>> VITAL SIGNS <<==================    T(C): 36.2 (01-27-19 @ 05:28), Max: 37.1 (01-26-19 @ 14:45)  HR: 69 (01-27-19 @ 05:28) (69 - 71)  BP: 109/58 (01-27-19 @ 05:28) (109/58 - 156/73)  RR: 17 (01-27-19 @ 05:28) (16 - 17)  SpO2: 98% (01-27-19 @ 05:28) (98% - 100%)     ==================>> LAB AND IMAGING <<==================                        11.3   10.5  )-----------( 217      ( 26 Jan 2019 06:22 )             35.6        145  |  109<H>  |  26<H>  ----------------------------<  117<H>  3.6   |  29  |  0.49<L>    Ca    8.2<L>      26 Jan 2019 06:22    TPro  6.6  /  Alb  2.3<L>  /  TBili  0.5  /  DBili  x   /  AST  30  /  ALT  31  /  AlkPhos  101  01-26          TSH:      3.20   (01-25-19)           Lipid profile:  (01-25-19)     Total: 121     LDL  : 67     HDL  :35     TG   :96     HgA1C: 5.9  (01-25-19)            _______________________  C U L T U R E S :    Culture - Blood (collected 25 Jan 2019 10:07)  Source: .Blood Blood  Preliminary Report (26 Jan 2019 11:01):    No growth to date.    Culture - Blood (collected 25 Jan 2019 10:07)  Source: .Blood Blood  Preliminary Report (26 Jan 2019 11:01):    No growth to date.    ___________________________________________________________________________________  ===============>>  A S S E S S M E N T   A N D   P L A N <<===============  ------------------------------------------------------------------------------------------    UTI  advanced dementia  post PEG  Htn  h/o gastric ulcer     ==>  continue abx  follow cultures  peg feeds  supportive care  turn and position     -GI/DVT Prophylaxis.  ___________________________  H. FORTINO Fu.  Pager: [phone] _________________________________________________________________________________________  ========>>  M E D I C A L   A T T E N D I N G  (covering today)   F O L L O W  U P  N O T E  <<=========  -----------------------------------------------------------------------------------------------------    - Patient seen and examined by me approximately sixty minutes ago.   - In summary,  MILTON GUIDRY is a 89y year old woman who originally presented with SOB  - Patient today overall doing ok, comfortable, tolerating PEG feeds, no other events noted otherwise     ==================>> REVIEW OF SYSTEM <<=================    limited ROS, pt not verbalizing much    ==================>> PHYSICAL EXAM <<=================    GEN: Alert, awake , NAD , comfortable  HEENT: NCAT, PERRL, mucosa dry   Neck: supple , no JVD  CVS: S1S2 , regular , No M/R/G appreciated  PULM: CTA B/L  ABD.: soft. non tender, non distended,  bowel sounds present, PEG in place   Extrem: intact pulses , no edema   PSYCH : normal mood,  not anxious      ==================>> MEDICATIONS <<====================    MEDICATIONS  (STANDING):  citalopram 10 milliGRAM(s) Oral daily  enoxaparin Injectable 40 milliGRAM(s) SubCutaneous daily  memantine 10 milliGRAM(s) Oral two times a day  pantoprazole    Tablet 40 milliGRAM(s) Oral before breakfast  piperacillin/tazobactam IVPB. 3.375 Gram(s) IV Intermittent every 8 hours  piperacillin/tazobactam IVPB. 3.375 Gram(s) IV Intermittent once  simvastatin 20 milliGRAM(s) Oral at bedtime    ==================>> VITAL SIGNS <<==================    T(C): 36.2 (01-27-19 @ 05:28), Max: 37.1 (01-26-19 @ 14:45)  HR: 69 (01-27-19 @ 05:28) (69 - 71)  BP: 109/58 (01-27-19 @ 05:28) (109/58 - 156/73)  RR: 17 (01-27-19 @ 05:28) (16 - 17)  SpO2: 98% (01-27-19 @ 05:28) (98% - 100%)     ==================>> LAB AND IMAGING <<==================                        11.3   10.5  )-----------( 217      ( 26 Jan 2019 06:22 )             35.6        145  |  109<H>  |  26<H>  ----------------------------<  117<H>  3.6   |  29  |  0.49<L>    Ca    8.2<L>      26 Jan 2019 06:22    TPro  6.6  /  Alb  2.3<L>  /  TBili  0.5  /  DBili  x   /  AST  30  /  ALT  31  /  AlkPhos  101  01-26          TSH:      3.20   (01-25-19)           Lipid profile:  (01-25-19)     Total: 121     LDL  : 67     HDL  :35     TG   :96     HgA1C: 5.9  (01-25-19)            _______________________  C U L T U R E S :    Culture - Blood (collected 25 Jan 2019 10:07)  Source: .Blood Blood  Preliminary Report (26 Jan 2019 11:01):    No growth to date.    Culture - Blood (collected 25 Jan 2019 10:07)  Source: .Blood Blood  Preliminary Report (26 Jan 2019 11:01):    No growth to date.    ___________________________________________________________________________________  ===============>>  A S S E S S M E N T   A N D   P L A N <<===============  ------------------------------------------------------------------------------------------    UTI  advanced dementia  post PEG  Htn  h/o gastric ulcer     ==>  continue abx  follow cultures  peg feeds  supportive care  turn and position     -GI/DVT Prophylaxis.  ___________________________  H. ROSANNA Fu (covering today)  Pager: [phone]

## 2019-01-28 ENCOUNTER — TRANSCRIPTION ENCOUNTER (OUTPATIENT)
Age: 84
End: 2019-01-28

## 2019-01-28 VITALS
SYSTOLIC BLOOD PRESSURE: 145 MMHG | RESPIRATION RATE: 17 BRPM | HEART RATE: 66 BPM | DIASTOLIC BLOOD PRESSURE: 76 MMHG | TEMPERATURE: 98 F | OXYGEN SATURATION: 99 %

## 2019-01-28 DIAGNOSIS — Z51.5 ENCOUNTER FOR PALLIATIVE CARE: ICD-10-CM

## 2019-01-28 DIAGNOSIS — R53.2 FUNCTIONAL QUADRIPLEGIA: ICD-10-CM

## 2019-01-28 DIAGNOSIS — E43 UNSPECIFIED SEVERE PROTEIN-CALORIE MALNUTRITION: ICD-10-CM

## 2019-01-28 LAB
ALBUMIN SERPL ELPH-MCNC: 2.1 G/DL — LOW (ref 3.5–5)
ALP SERPL-CCNC: 93 U/L — SIGNIFICANT CHANGE UP (ref 40–120)
ALT FLD-CCNC: 30 U/L DA — SIGNIFICANT CHANGE UP (ref 10–60)
ANION GAP SERPL CALC-SCNC: 5 MMOL/L — SIGNIFICANT CHANGE UP (ref 5–17)
AST SERPL-CCNC: 19 U/L — SIGNIFICANT CHANGE UP (ref 10–40)
BILIRUB SERPL-MCNC: 0.4 MG/DL — SIGNIFICANT CHANGE UP (ref 0.2–1.2)
BUN SERPL-MCNC: 20 MG/DL — HIGH (ref 7–18)
CALCIUM SERPL-MCNC: 7.9 MG/DL — LOW (ref 8.4–10.5)
CHLORIDE SERPL-SCNC: 107 MMOL/L — SIGNIFICANT CHANGE UP (ref 96–108)
CO2 SERPL-SCNC: 29 MMOL/L — SIGNIFICANT CHANGE UP (ref 22–31)
CREAT SERPL-MCNC: 0.49 MG/DL — LOW (ref 0.5–1.3)
GLUCOSE SERPL-MCNC: 95 MG/DL — SIGNIFICANT CHANGE UP (ref 70–99)
HCT VFR BLD CALC: 33.8 % — LOW (ref 34.5–45)
HGB BLD-MCNC: 11 G/DL — LOW (ref 11.5–15.5)
MCHC RBC-ENTMCNC: 29.8 PG — SIGNIFICANT CHANGE UP (ref 27–34)
MCHC RBC-ENTMCNC: 32.4 GM/DL — SIGNIFICANT CHANGE UP (ref 32–36)
MCV RBC AUTO: 91.9 FL — SIGNIFICANT CHANGE UP (ref 80–100)
PLATELET # BLD AUTO: 206 K/UL — SIGNIFICANT CHANGE UP (ref 150–400)
POTASSIUM SERPL-MCNC: 3.6 MMOL/L — SIGNIFICANT CHANGE UP (ref 3.5–5.3)
POTASSIUM SERPL-SCNC: 3.6 MMOL/L — SIGNIFICANT CHANGE UP (ref 3.5–5.3)
PROT SERPL-MCNC: 6.3 G/DL — SIGNIFICANT CHANGE UP (ref 6–8.3)
RBC # BLD: 3.68 M/UL — LOW (ref 3.8–5.2)
RBC # FLD: 13 % — SIGNIFICANT CHANGE UP (ref 10.3–14.5)
SODIUM SERPL-SCNC: 141 MMOL/L — SIGNIFICANT CHANGE UP (ref 135–145)
WBC # BLD: 7.3 K/UL — SIGNIFICANT CHANGE UP (ref 3.8–10.5)
WBC # FLD AUTO: 7.3 K/UL — SIGNIFICANT CHANGE UP (ref 3.8–10.5)

## 2019-01-28 PROCEDURE — 83036 HEMOGLOBIN GLYCOSYLATED A1C: CPT

## 2019-01-28 PROCEDURE — 84443 ASSAY THYROID STIM HORMONE: CPT

## 2019-01-28 PROCEDURE — 93005 ELECTROCARDIOGRAM TRACING: CPT

## 2019-01-28 PROCEDURE — 87631 RESP VIRUS 3-5 TARGETS: CPT

## 2019-01-28 PROCEDURE — 82553 CREATINE MB FRACTION: CPT

## 2019-01-28 PROCEDURE — 99223 1ST HOSP IP/OBS HIGH 75: CPT

## 2019-01-28 PROCEDURE — 82746 ASSAY OF FOLIC ACID SERUM: CPT

## 2019-01-28 PROCEDURE — 82803 BLOOD GASES ANY COMBINATION: CPT

## 2019-01-28 PROCEDURE — 83735 ASSAY OF MAGNESIUM: CPT

## 2019-01-28 PROCEDURE — 96374 THER/PROPH/DIAG INJ IV PUSH: CPT

## 2019-01-28 PROCEDURE — 36415 COLL VENOUS BLD VENIPUNCTURE: CPT

## 2019-01-28 PROCEDURE — 80048 BASIC METABOLIC PNL TOTAL CA: CPT

## 2019-01-28 PROCEDURE — 71045 X-RAY EXAM CHEST 1 VIEW: CPT

## 2019-01-28 PROCEDURE — 87086 URINE CULTURE/COLONY COUNT: CPT

## 2019-01-28 PROCEDURE — 82607 VITAMIN B-12: CPT

## 2019-01-28 PROCEDURE — 82962 GLUCOSE BLOOD TEST: CPT

## 2019-01-28 PROCEDURE — 84100 ASSAY OF PHOSPHORUS: CPT

## 2019-01-28 PROCEDURE — 99283 EMERGENCY DEPT VISIT LOW MDM: CPT | Mod: 25

## 2019-01-28 PROCEDURE — 74018 RADEX ABDOMEN 1 VIEW: CPT

## 2019-01-28 PROCEDURE — 71250 CT THORAX DX C-: CPT

## 2019-01-28 PROCEDURE — 85027 COMPLETE CBC AUTOMATED: CPT

## 2019-01-28 PROCEDURE — 80053 COMPREHEN METABOLIC PANEL: CPT

## 2019-01-28 PROCEDURE — 87040 BLOOD CULTURE FOR BACTERIA: CPT

## 2019-01-28 PROCEDURE — 84484 ASSAY OF TROPONIN QUANT: CPT

## 2019-01-28 PROCEDURE — 99285 EMERGENCY DEPT VISIT HI MDM: CPT | Mod: 25

## 2019-01-28 PROCEDURE — 80061 LIPID PANEL: CPT

## 2019-01-28 PROCEDURE — 82550 ASSAY OF CK (CPK): CPT

## 2019-01-28 PROCEDURE — 81001 URINALYSIS AUTO W/SCOPE: CPT

## 2019-01-28 RX ADMIN — CITALOPRAM 10 MILLIGRAM(S): 10 TABLET, FILM COATED ORAL at 11:25

## 2019-01-28 RX ADMIN — PANTOPRAZOLE SODIUM 40 MILLIGRAM(S): 20 TABLET, DELAYED RELEASE ORAL at 05:26

## 2019-01-28 RX ADMIN — ENOXAPARIN SODIUM 40 MILLIGRAM(S): 100 INJECTION SUBCUTANEOUS at 11:25

## 2019-01-28 RX ADMIN — MEMANTINE HYDROCHLORIDE 10 MILLIGRAM(S): 10 TABLET ORAL at 05:25

## 2019-01-28 RX ADMIN — PIPERACILLIN AND TAZOBACTAM 25 GRAM(S): 4; .5 INJECTION, POWDER, LYOPHILIZED, FOR SOLUTION INTRAVENOUS at 05:25

## 2019-01-28 NOTE — DISCHARGE NOTE ADULT - MEDICATION SUMMARY - MEDICATIONS TO TAKE
I will START or STAY ON the medications listed below when I get home from the hospital:    ibuprofen 200 mg oral capsule  -- 1 cap(s) by mouth every 6 hours  -- Indication: For Pain    CeleXA 10 mg oral tablet  -- 1 tab(s) by mouth once a day  -- Indication: For Depression    Zocor 20 mg oral tablet  -- 1 tab(s) by mouth once a day (at bedtime)  -- Indication: For Hyperlipidemia    docusate sodium 50 mg/15 mL oral syrup  -- 15 milliliter(s) by mouth once a day  -- Indication: For constipation    Nuedexta 20 mg-10 mg oral capsule  -- 1 cap(s) by mouth every 12 hours  -- Indication: For mood stabalizer    Namenda 10 mg oral tablet  -- 1 tab(s) by mouth 2 times a day  -- Indication: For Dementia    omeprazole 20 mg oral delayed release capsule  -- 1 cap(s) by mouth once a day  -- Indication: For GERD

## 2019-01-28 NOTE — PROGRESS NOTE ADULT - ASSESSMENT
seen and examined  vsstable  afebrile  physical  unchanged     awake very confused  as from long time time sec  to advanced dementia.   not in  any distress  lungs clear  abd soft bs nml  no suprapubic tenderness.  labs noted acceptable  as advanced dementia  from a while  bed bound , peg tube.  severe dementia  poor prognosis    palleative on case   d/c planning back to [facility]

## 2019-01-28 NOTE — DISCHARGE NOTE ADULT - CARE PROVIDER_API CALL
Lam Conway), Internal Medicine  [address] [place] [address]  Phone: [phone]  Fax: [phone]

## 2019-01-28 NOTE — DISCHARGE NOTE ADULT - PATIENT PORTAL LINK FT
You can access the [facility], offered by [facility], by registering with the following website: [email or web address]

## 2019-01-28 NOTE — CONSULT NOTE ADULT - PROBLEM SELECTOR RECOMMENDATION 9
Bedbound, non verbal, contracted.  PEG feeds.  FAST 7f.  Patient is appropriate for hospice.  Family does not want hospice.

## 2019-01-28 NOTE — DISCHARGE NOTE ADULT - CARE PLAN
Principal Discharge DX:	Dementia  Goal:	Pt.'s safety will be maintained  Assessment and plan of treatment:	You have advanced dementia and are non verbal at this time therefore we were unable to assess your level of orientation.    -Continue meds as prior to hospitalization.  -Maintain safety  Secondary Diagnosis:	Hypoxia  Goal:	O2 sats >92% on room air with no difficulty breathing  Assessment and plan of treatment:	You were brought to the hospital from NH due to low O2 sats, reportedly 89%.  You were placed on N/C 2L/Min with O2 sats >95%.  Your chest CT was negative for PE.  You were taken off N/C today with persisting O2 sats >97% with no s&s of resp. distress.  Please keep HOB elevated to assist with breathing effort.  Secondary Diagnosis:	UTI (urinary tract infection)  Assessment and plan of treatment:	Your blood and urine cultures were negative.  You were treated for UTI with Zosyn.  You no longer require antibiotics at this time.    -Maintain adequate hydration at all times.  Secondary Diagnosis:	HTN (hypertension)  Assessment and plan of treatment:	Your blood pressure was stable on Amlodipine.    -continue Amlodipine as pre hospitalization.  Secondary Diagnosis:	Hyperlipidemia  Assessment and plan of treatment:	-Continue Simvastatin

## 2019-01-28 NOTE — PROGRESS NOTE ADULT - SUBJECTIVE AND OBJECTIVE BOX
NP Note discussed with  Primary Attending    Patient is a 89y old  Female who presents with a chief complaint of Shortness of breath (2019 13:56)      INTERVAL HPI/OVERNIGHT EVENTS: Maintaining oxygen saturation on 2 LPM nasal cannula    MEDICATIONS  (STANDING):  citalopram 10 milliGRAM(s) Oral daily  enoxaparin Injectable 40 milliGRAM(s) SubCutaneous daily  memantine 10 milliGRAM(s) Oral two times a day  pantoprazole    Tablet 40 milliGRAM(s) Oral before breakfast  piperacillin/tazobactam IVPB. 3.375 Gram(s) IV Intermittent every 8 hours  piperacillin/tazobactam IVPB. 3.375 Gram(s) IV Intermittent once  simvastatin 20 milliGRAM(s) Oral at bedtime    MEDICATIONS  (PRN):      __________________________________________________  REVIEW OF SYSTEMS:  Unobtainable due to altered mental status      Vital Signs Last 24 Hrs  T(C): 36.1 (2019 05:44), Max: 37.6 (2019 12:43)  T(F): 97 (2019 05:44), Max: 99.6 (2019 12:43)  HR: 78 (2019 05:44) (75 - 88)  BP: 145/68 (2019 05:44) (135/48 - 161/79)  BP(mean): --  RR: 16 (2019 05:44) (16 - 21)  SpO2: 100% (2019 05:44) (98% - 100%)    ________________________________________________  PHYSICAL EXAM:  GENERAL: NAD  HEENT: Normocephalic;  conjunctivae and sclerae clear; moist mucous membranes;   NECK : supple  CHEST/LUNG: Diminished breath sounds bilateral bases  HEART: S1 S2  regular; no murmurs, gallops or rubs  ABDOMEN: Soft, Nontender, Nondistended; Bowel sounds present; Peg intact  EXTREMITIES: no cyanosis; no edema; no calf tenderness  SKIN: warm and dry; no rash  NERVOUS SYSTEM:  Awake. Not orientated. Nonverbal    _________________________________________________  LABS:                        11.2   11.9  )-----------( 235      ( 2019 06:52 )             36.1         147<H>  |  111<H>  |  34<H>  ----------------------------<  115<H>  4.0   |  30  |  0.59    Ca    8.3<L>      2019 06:52  Phos  4.1       Mg     2.4         TPro  7.0  /  Alb  2.2<L>  /  TBili  0.5  /  DBili  x   /  AST  30  /  ALT  34  /  AlkPhos  121<H>        Urinalysis Basic - ( 2019 10:44 )    Color: Yellow / Appearance: Clear / S.015 / pH: x  Gluc: x / Ketone: Negative  / Bili: Negative / Urobili: Negative   Blood: x / Protein: 30 mg/dL / Nitrite: Negative   Leuk Esterase: Small / RBC: 10-25 /HPF / WBC 11-25 /HPF   Sq Epi: x / Non Sq Epi: Many /HPF / Bacteria: Moderate /HPF      CAPILLARY BLOOD GLUCOSE            RADIOLOGY & ADDITIONAL TESTS:    Imaging Personally Reviewed:  YES  < from: CT Chest No Cont (19 @ 10:05) >   Patent central airways. Bibasilar subsegmental   atelectasis. Lingular calcified granuloma.  PLEURA: No pleural effusion.  VESSELS: Atheromatous disease of the aorta. Coronary artery   calcifications.  HEART: Heart size is normal. No pericardial effusion. Mitral annular   calcifications. Aortic valve calcifications.  MEDIASTINUM AND JAMSHID: No lymphadenopathy.  CHEST WALL AND LOWER NECK: Within normal limits.  VISUALIZED UPPER ABDOMEN: Gastrostomy tube noted to be in place. Left   renal parapelvic cysts. Status post cholecystectomy. Tiny renal hernia.  BONES: Chronic left posterior ninth, 10th, and 11th rib fractures.   Multilevel degenerative changes of the spine.    IMPRESSION:     No pneumonia.    < end of copied text >      Consultant(s) Notes Reviewed:   YES/ No    Care Discussed with Consultants :     Plan of care was discussed with patient and /or primary care giver; all questions and concerns were addressed and care was aligned with patient's wishes.
HPI:  89 yr old F from [facility] bedbound with advanced dementia, Htn, gastric ulcer is sent from NH due to hypoxia of 80s, dyspnea and concern of PNA. Patient was recently came to [facility] ED for Peg tube change and was discharged. Further hx is limited as patient is AXO=0.    In ED, patient's vital signs were stable, labs were remarkable for WBC:12.5, CXR shows "Nonspecific prominence of the right hilum and mass/lymphadenopathy, no pleural effusion, Atelectasis right lung base. Calcified granuloma left lung base. No pleural effusion. Mild elevation right hemidiaphragm." Patient was saturating 95% on 2L nasal cannula    Goals of care: DNR/ DNI. HCP is ok with Central line and IV pressors. (24 Jan 2019 13:56)      Patient is a 89y old  Female who presents with a chief complaint of Shortness of breath (27 Jan 2019 12:45)      INTERVAL HPI/OVERNIGHT EVENTS:  T(C): 36.9 (01-28-19 @ 05:18), Max: 37.1 (01-27-19 @ 14:18)  HR: 70 (01-28-19 @ 05:53) (69 - 76)  BP: 169/72 (01-28-19 @ 05:53) (104/27 - 169/72)  RR: 15 (01-28-19 @ 05:18) (15 - 18)  SpO2: 99% (01-28-19 @ 05:18) (96% - 100%)  Wt(kg): --  I&O's Summary      REVIEW OF SYSTEMS: denies fever, chills, SOB, palpitations, chest pain, abdominal pain, nausea, vomitting, diarrhea, constipation, dizziness    MEDICATIONS  (STANDING):  citalopram 10 milliGRAM(s) Oral daily  enoxaparin Injectable 40 milliGRAM(s) SubCutaneous daily  memantine 10 milliGRAM(s) Oral two times a day  pantoprazole    Tablet 40 milliGRAM(s) Oral before breakfast  simvastatin 20 milliGRAM(s) Oral at bedtime    MEDICATIONS  (PRN):      PHYSICAL EXAM:  GENERAL: NAD, well-groomed, well-developed  HEAD:  Atraumatic, Normocephalic  EYES: EOMI, PERRLA, conjunctiva and sclera clear  ENMT: No tonsillar erythema, exudates, or enlargement; Moist mucous membranes, Good dentition, No lesions  NECK: Supple, No JVD, Normal thyroid  NERVOUS SYSTEM:  Alert & Oriented X3, Good concentration; Motor Strength 5/5 B/L upper and lower extremities; DTRs 2+ intact and symmetric  CHEST/LUNG: Clear to percussion bilaterally; No rales, rhonchi, wheezing, or rubs  HEART: Regular rate and rhythm; No murmurs, rubs, or gallops  ABDOMEN: Soft, Nontender, Nondistended; Bowel sounds present  EXTREMITIES:  2+ Peripheral Pulses, No clubbing, cyanosis, or edema  LYMPH: No lymphadenopathy noted  SKIN: No rashes or lesions  LABS:                        11.0   7.3   )-----------( 206      ( 28 Jan 2019 06:35 )             33.8     01-28    141  |  107  |  20<H>  ----------------------------<  95  3.6   |  29  |  0.49<L>    Ca    7.9<L>      28 Jan 2019 06:35    TPro  6.3  /  Alb  2.1<L>  /  TBili  0.4  /  DBili  x   /  AST  19  /  ALT  30  /  AlkPhos  93  01-28        CAPILLARY BLOOD GLUCOSE
HPI:  89 yr old F from [facility] bedbound with advanced dementia, Htn, gastric ulcer is sent from NH due to hypoxia of 80s, dyspnea and concern of PNA. Patient was recently came to [facility] ED for Peg tube change and was discharged. Further hx is limited as patient is AXO=0.    In ED, patient's vital signs were stable, labs were remarkable for WBC:12.5, CXR shows "Nonspecific prominence of the right hilum and mass/lymphadenopathy, no pleural effusion, Atelectasis right lung base. Calcified granuloma left lung base. No pleural effusion. Mild elevation right hemidiaphragm." Patient was saturating 95% on 2L nasal cannula    Goals of care: DNR/ DNI. HCP is ok with Central line and IV pressors. (2019 13:56)      Patient is a 89y old  Female who presents with a chief complaint of Shortness of breath (2019 15:36)      INTERVAL HPI/OVERNIGHT EVENTS:  T(C): 37.1 (19 @ 14:45), Max: 37.1 (19 @ 14:45)  HR: 71 (19 @ 14:45) (71 - 113)  BP: 144/71 (19 @ 14:45) (144/71 - 179/72)  RR: 16 (19 @ 14:45) (14 - 16)  SpO2: 97% (19 @ 05:18) (97% - 98%)  Wt(kg): --  I&O's Summary      REVIEW OF SYSTEMS: denies fever, chills, SOB, palpitations, chest pain, abdominal pain, nausea, vomitting, diarrhea, constipation, dizziness    MEDICATIONS  (STANDING):  citalopram 10 milliGRAM(s) Oral daily  enoxaparin Injectable 40 milliGRAM(s) SubCutaneous daily  memantine 10 milliGRAM(s) Oral two times a day  pantoprazole    Tablet 40 milliGRAM(s) Oral before breakfast  piperacillin/tazobactam IVPB. 3.375 Gram(s) IV Intermittent every 8 hours  piperacillin/tazobactam IVPB. 3.375 Gram(s) IV Intermittent once  simvastatin 20 milliGRAM(s) Oral at bedtime    MEDICATIONS  (PRN):      PHYSICAL EXAM:  GENERAL: NAD, well-groomed, well-developed  HEAD:  Atraumatic, Normocephalic  EYES: EOMI, PERRLA, conjunctiva and sclera clear  ENMT: No tonsillar erythema, exudates, or enlargement; Moist mucous membranes, Good dentition, No lesions  NECK: Supple, No JVD, Normal thyroid  NERVOUS SYSTEM:  Alert & Oriented X3, Good concentration; Motor Strength 5/5 B/L upper and lower extremities; DTRs 2+ intact and symmetric  CHEST/LUNG: Clear to percussion bilaterally; No rales, rhonchi, wheezing, or rubs  HEART: Regular rate and rhythm; No murmurs, rubs, or gallops  ABDOMEN: Soft, Nontender, Nondistended; Bowel sounds present  EXTREMITIES:  2+ Peripheral Pulses, No clubbing, cyanosis, or edema  LYMPH: No lymphadenopathy noted  SKIN: No rashes or lesions  LABS:                        11.3   10.5  )-----------( 217      ( 2019 06:22 )             35.6         145  |  109<H>  |  26<H>  ----------------------------<  117<H>  3.6   |  29  |  0.49<L>    Ca    8.2<L>      2019 06:22  Phos  4.1       Mg     2.4         TPro  6.6  /  Alb  2.3<L>  /  TBili  0.5  /  DBili  x   /  AST  30  /  ALT  31  /  AlkPhos  101        Urinalysis Basic - ( 2019 10:44 )    Color: Yellow / Appearance: Clear / S.015 / pH: x  Gluc: x / Ketone: Negative  / Bili: Negative / Urobili: Negative   Blood: x / Protein: 30 mg/dL / Nitrite: Negative   Leuk Esterase: Small / RBC: 10-25 /HPF / WBC 11-25 /HPF   Sq Epi: x / Non Sq Epi: Many /HPF / Bacteria: Moderate /HPF      CAPILLARY BLOOD GLUCOSE            Urinalysis Basic - ( 2019 10:44 )    Color: Yellow / Appearance: Clear / S.015 / pH: x  Gluc: x / Ketone: Negative  / Bili: Negative / Urobili: Negative   Blood: x / Protein: 30 mg/dL / Nitrite: Negative   Leuk Esterase: Small / RBC: 10-25 /HPF / WBC 11-25 /HPF   Sq Epi: x / Non Sq Epi: Many /HPF / Bacteria: Moderate /HPF
HPI:  89 yr old F from [facility] bedbound with advanced dementia, Htn, gastric ulcer is sent from NH due to hypoxia of 80s, dyspnea and concern of PNA. Patient was recently came to [facility] ED for Peg tube change and was discharged. Further hx is limited as patient is AXO=0.    In ED, patient's vital signs were stable, labs were remarkable for WBC:12.5, CXR shows "Nonspecific prominence of the right hilum and mass/lymphadenopathy, no pleural effusion, Atelectasis right lung base. Calcified granuloma left lung base. No pleural effusion. Mild elevation right hemidiaphragm." Patient was saturating 95% on 2L nasal cannula    Goals of care: DNR/ DNI. HCP is ok with Central line and IV pressors. (2019 13:56)      Patient is a 89y old  Female who presents with a chief complaint of Shortness of breath (2019 12:30)      INTERVAL HPI/OVERNIGHT EVENTS:  T(C): 37.1 (19 @ 14:18), Max: 37.2 (19 @ 00:01)  HR: 77 (19 @ 14:18) (75 - 88)  BP: 100/76 (19 @ 14:18) (100/76 - 161/79)  RR: 18 (19 @ 14:18) (16 - 21)  SpO2: 95% (19 @ 14:18) (95% - 100%)  Wt(kg): --  I&O's Summary      REVIEW OF SYSTEMS: denies fever, chills, SOB, palpitations, chest pain, abdominal pain, nausea, vomitting, diarrhea, constipation, dizziness    MEDICATIONS  (STANDING):  citalopram 10 milliGRAM(s) Oral daily  enoxaparin Injectable 40 milliGRAM(s) SubCutaneous daily  memantine 10 milliGRAM(s) Oral two times a day  pantoprazole    Tablet 40 milliGRAM(s) Oral before breakfast  piperacillin/tazobactam IVPB. 3.375 Gram(s) IV Intermittent every 8 hours  piperacillin/tazobactam IVPB. 3.375 Gram(s) IV Intermittent once  simvastatin 20 milliGRAM(s) Oral at bedtime    MEDICATIONS  (PRN):      PHYSICAL EXAM:  GENERAL: NAD, well-groomed, well-developed  HEAD:  Atraumatic, Normocephalic  EYES: EOMI, PERRLA, conjunctiva and sclera clear  ENMT: No tonsillar erythema, exudates, or enlargement; Moist mucous membranes, Good dentition, No lesions  NECK: Supple, No JVD, Normal thyroid  NERVOUS SYSTEM:  Alert & Oriented X3, Good concentration; Motor Strength 5/5 B/L upper and lower extremities; DTRs 2+ intact and symmetric  CHEST/LUNG: Clear to percussion bilaterally; No rales, rhonchi, wheezing, or rubs  HEART: Regular rate and rhythm; No murmurs, rubs, or gallops  ABDOMEN: Soft, Nontender, Nondistended; Bowel sounds present  EXTREMITIES:  2+ Peripheral Pulses, No clubbing, cyanosis, or edema  LYMPH: No lymphadenopathy noted  SKIN: No rashes or lesions  LABS:                        11.2   11.9  )-----------( 235      ( 2019 06:52 )             36.1         147<H>  |  111<H>  |  34<H>  ----------------------------<  115<H>  4.0   |  30  |  0.59    Ca    8.3<L>      2019 06:52  Phos  4.1       Mg     2.4         TPro  7.0  /  Alb  2.2<L>  /  TBili  0.5  /  DBili  x   /  AST  30  /  ALT  34  /  AlkPhos  121<H>        Urinalysis Basic - ( 2019 10:44 )    Color: Yellow / Appearance: Clear / S.015 / pH: x  Gluc: x / Ketone: Negative  / Bili: Negative / Urobili: Negative   Blood: x / Protein: 30 mg/dL / Nitrite: Negative   Leuk Esterase: Small / RBC: 10-25 /HPF / WBC 11-25 /HPF   Sq Epi: x / Non Sq Epi: Many /HPF / Bacteria: Moderate /HPF      CAPILLARY BLOOD GLUCOSE            Urinalysis Basic - ( 2019 10:44 )    Color: Yellow / Appearance: Clear / S.015 / pH: x  Gluc: x / Ketone: Negative  / Bili: Negative / Urobili: Negative   Blood: x / Protein: 30 mg/dL / Nitrite: Negative   Leuk Esterase: Small / RBC: 10-25 /HPF / WBC 11-25 /HPF   Sq Epi: x / Non Sq Epi: Many /HPF / Bacteria: Moderate /HPF

## 2019-01-28 NOTE — CONSULT NOTE ADULT - SUBJECTIVE AND OBJECTIVE BOX
HPI:  89 yr old F from [facility] bedbound with advanced dementia, Htn, gastric ulcer is sent from NH due to hypoxia of 80s, dyspnea and concern of PNA. Patient was recently came to [facility] ED for Peg tube change and was discharged. Further hx is limited as patient is AXO=0.    In ED, patient's vital signs were stable, labs were remarkable for WBC:12.5, CXR shows "Nonspecific prominence of the right hilum and mass/lymphadenopathy, no pleural effusion, Atelectasis right lung base. Calcified granuloma left lung base. No pleural effusion. Mild elevation right hemidiaphragm." Patient was saturating 95% on 2L nasal cannula    Goals of care: DNR/ DNI. HCP is ok with Central line and IV pressors. (24 Jan 2019 13:56)      PAST MEDICAL & SURGICAL HISTORY:  Dementia  HTN (hypertension)  Hyperlipidemia  No significant past surgical history      SOCIAL HISTORY:    Admitted from:  home assisted living [facility]   Substance abuse history:              Tobacco hx:                  Alcohol hx:              Home Opioid hx:  Protestant:                                    Preferred Language:    Surrogate/HCP/Guardian:            Phone#:    FAMILY HISTORY:    Baseline ADLs (prior to admission):    Allergies    No Known Allergies    Intolerances      Present Symptoms:   Dyspnea:   Nausea/Vomiting:   Anxiety:  Depressed   Fatigue:  Loss of appetite:   Pain:                                location:          Review of Systems: [All others negative or Unable to obtain due to poor mentation]    MEDICATIONS  (STANDING):  citalopram 10 milliGRAM(s) Oral daily  enoxaparin Injectable 40 milliGRAM(s) SubCutaneous daily  memantine 10 milliGRAM(s) Oral two times a day  pantoprazole    Tablet 40 milliGRAM(s) Oral before breakfast  simvastatin 20 milliGRAM(s) Oral at bedtime    MEDICATIONS  (PRN):      PHYSICAL EXAM:    Vital Signs Last 24 Hrs  T(C): 36.9 (28 Jan 2019 05:18), Max: 37.1 (27 Jan 2019 14:18)  T(F): 98.5 (28 Jan 2019 05:18), Max: 98.8 (27 Jan 2019 14:18)  HR: 70 (28 Jan 2019 05:53) (69 - 76)  BP: 169/72 (28 Jan 2019 05:53) (104/27 - 169/72)  BP(mean): --  RR: 15 (28 Jan 2019 05:18) (15 - 18)  SpO2: 99% (28 Jan 2019 05:18) (96% - 100%)    General: alert  oriented x ____    [lethargic distressed cachexia  nonverbal  unarousable verbal]  Karnofsky Performance Score/Palliative Performance Status Version2:     %    HEENT: normal  dry mouth  ET tube/trach oral lesions:  Lungs: comfortable tachypnea/labored breathing  excessive secretions  CV: normal  tachycardia  GI: normal  distended  tender  incontinent               PEG/NG/OG tube  constipation  last BM:   : normal  incontinent  oliguria/anuria  jackson  Musculoskeletal: normal  weakness  edema             ambulatory  bedbound/wheelchair bound  Skin: normal  pressure ulcers: stage: edema: other:  Neuro: no deficits cognitive impairment dsyphagia/dysarthria paresis: other:  Oral intake ability: unable/only mouth care [minimal moderate full capability]  Diet: [NPO]    LABS:                        11.0   7.3   )-----------( 206      ( 28 Jan 2019 06:35 )             33.8     01-28    141  |  107  |  20<H>  ----------------------------<  95  3.6   |  29  |  0.49<L>    Ca    7.9<L>      28 Jan 2019 06:35    TPro  6.3  /  Alb  2.1<L>  /  TBili  0.4  /  DBili  x   /  AST  19  /  ALT  30  /  AlkPhos  93  01-28        RADIOLOGY & ADDITIONAL STUDIES:    ADVANCE DIRECTIVES: HPI:  89 yr old F from [facility] bedbound with advanced dementia, Htn, gastric ulcer is sent from NH due to hypoxia of 80s, dyspnea and concern of PNA. Patient was recently came to [facility] ED for Peg tube change and was discharged. Further hx is limited as patient is AXO=0.      PAST MEDICAL & SURGICAL HISTORY:  Dementia  HTN (hypertension)  Hyperlipidemia  No significant past surgical history      SOCIAL HISTORY:    Admitted from:  [facility]  HCP: Anya Elkins (dtr)          Phone#:[phone]    FAMILY HISTORY:  Patient unable to participate given mentation    Baseline ADLs (prior to admission):  Bedbound, nonverbal    Allergies    No Known Allergies    Intolerances      Present Symptoms:    Unable to obtain due to poor mentation]    MEDICATIONS  (STANDING):  citalopram 10 milliGRAM(s) Oral daily  enoxaparin Injectable 40 milliGRAM(s) SubCutaneous daily  memantine 10 milliGRAM(s) Oral two times a day  pantoprazole    Tablet 40 milliGRAM(s) Oral before breakfast  simvastatin 20 milliGRAM(s) Oral at bedtime    MEDICATIONS  (PRN):      PHYSICAL EXAM:    Vital Signs Last 24 Hrs  T(C): 36.9 (28 Jan 2019 05:18), Max: 37.1 (27 Jan 2019 14:18)  T(F): 98.5 (28 Jan 2019 05:18), Max: 98.8 (27 Jan 2019 14:18)  HR: 70 (28 Jan 2019 05:53) (69 - 76)  BP: 169/72 (28 Jan 2019 05:53) (104/27 - 169/72)  BP(mean): --  RR: 15 (28 Jan 2019 05:18) (15 - 18)  SpO2: 99% (28 Jan 2019 05:18) (96% - 100%)    General: awake, nonverbal, NAD  Karnofsky Performance Score/Palliative Performance Status Version2: 30    %    HEENT: oropharynx clear  Lungs: unlabored  CV: S1S2, RRR  GI: soft, +BS; +PEG  : urinating  Musculoskeletal: bedbound, contracted  Skin: fragile, no rash or lesions  Neuro: unable to follow cammands  Oral intake ability: unable  Diet: PEG feeds    LABS:                        11.0   7.3   )-----------( 206      ( 28 Jan 2019 06:35 )             33.8     01-28    141  |  107  |  20<H>  ----------------------------<  95  3.6   |  29  |  0.49<L>    Ca    7.9<L>      28 Jan 2019 06:35    TPro  6.3  /  Alb  2.1<L>  /  TBili  0.4  /  DBili  x   /  AST  19  /  ALT  30  /  AlkPhos  93  01-28        RADIOLOGY & ADDITIONAL STUDIES:  Reviewed    ADVANCE DIRECTIVES: DNR/DNI

## 2019-01-28 NOTE — CONSULT NOTE ADULT - PROBLEM SELECTOR RECOMMENDATION 3
Due to endstage dementia bedbound, contracted.  High risks for skin failure.  Dependent on all ADLs.  Frequent positioning.

## 2019-01-28 NOTE — DISCHARGE NOTE ADULT - HOSPITAL COURSE
89 yr old F from [facility] bedbound with advanced dementia, Htn, gastric ulcer is sent from NH due to hypoxia of 80s, dyspnea and concern of PNA. Patient  recently came to [facility] ED for Peg tube change and was discharged. Further hx is limited as patient is AXO=0.  In ED, patient's vital signs were stable, labs were remarkable for WBC:12.5, CXR shows "Nonspecific prominence of the right hilum and mass/lymphadenopathy, no pleural effusion, Atelectasis right lung base. Calcified granuloma left lung base. No pleural effusion. Mild elevation right hemidiaphragm." Patient was saturating 95% on 2L nasal cannula  Chest CT was negative for PNA.  Pt.'s hypoxia resolved and is now with O2 sat 97-98% on RA.  Pt. with negative urine and blood cultures, afebrile with no leukocytosis.  Peg tube intact with Jevity feeding in progress.  Pt. with advanced Dementia and advanced age.  Attempt made to refer pt. to palliative care however daughter Ana Laura OWEN refuses at this time.  Case discussed with attending, pt. clear for discharge.

## 2019-01-28 NOTE — DISCHARGE NOTE ADULT - PLAN OF CARE
Pt.'s safety will be maintained You have advanced dementia and are non verbal at this time therefore we were unable to assess your level of orientation.    -Continue meds as prior to hospitalization.  -Maintain safety O2 sats >92% on room air with no difficulty breathing You were brought to the hospital from NH due to low O2 sats, reportedly 89%.  You were placed on N/C 2L/Min with O2 sats >95%.  Your chest CT was negative for PE.  You were taken off N/C today with persisting O2 sats >97% with no s&s of resp. distress.  Please keep HOB elevated to assist with breathing effort. Your blood and urine cultures were negative.  You were treated for UTI with Zosyn.  You no longer require antibiotics at this time.    -Maintain adequate hydration at all times. Your blood pressure was stable on Amlodipine.    -continue Amlodipine as pre hospitalization. -Continue Simvastatin

## 2019-01-28 NOTE — CONSULT NOTE ADULT - PROBLEM/RECOMMENDATION-4
DISPLAY PLAN FREE TEXT Double M-Plasty Complex Repair Preamble Text (Leave Blank If You Do Not Want): Extensive wide undermining was performed.

## 2019-01-28 NOTE — CONSULT NOTE ADULT - PROBLEM SELECTOR RECOMMENDATION 4
Spoke with patient's daughter Anya who is her HCP in length regarding clinical condition.  Explained the disease trajectory of dementia and pt's poor prognosis.    Explain that the patient is appropriate for palliative of hospice care.  HCP was adamant that she does not want either palliative care or hospice at this time.  She believes her mother is doing well given her comorbidities and has not been hospitalized often.  She wants to maintain DNR/DNI, prior MOLST completed and in the chart.     Support provided. Spoke with patient's daughter Anya who is her HCP in length regarding clinical condition.  Explained the disease trajectory of dementia and pt's poor prognosis.    Explain that the patient is appropriate for palliative or hospice care.  HCP was adamant that she does not want either palliative care or hospice at this time.  She believes her mother is doing well given her comorbidities and has not been hospitalized often.  She wants to maintain DNR/DNI, prior MOLST completed and in the chart.     Support provided.

## 2019-01-30 LAB
CULTURE RESULTS: SIGNIFICANT CHANGE UP
CULTURE RESULTS: SIGNIFICANT CHANGE UP
SPECIMEN SOURCE: SIGNIFICANT CHANGE UP
SPECIMEN SOURCE: SIGNIFICANT CHANGE UP

## 2019-10-08 NOTE — ED ADULT NURSE NOTE - NS ED NURSE PRESS ULCER LOCATION 11
Chief Complaint   Patient presents with   • Other     cut on finger        Patient is here for a chronic cut to his right thumb. Patient states that it happened approximately 2 years ago when he stabbed himself with a box opener. Since then it has closed and re-cracked. He has continued to pick and tweeze off the skin. He is worried part of his fingernail is growing in the wound bed. It began to bleed yesterday and patient thought that he should have it looked at. He denies any purulent drainage. He is not using any topically or covering it.      PAST MEDICAL HISTORY:  Past Medical History:   Diagnosis Date   • Allergy    • Back pain    • Cataract    • Dupuytren's contracture of left hand    • DVT (deep venous thrombosis) (CMS/HCC)    • Essential (primary) hypertension    • GERD (gastroesophageal reflux disease)    • [place] (hard of hearing)    • Hyperlipidemia    • Hyperphoria    • Malignant neoplasm (CMS/HCC)     prostate CA   • OA (osteoarthritis)    • PE (pulmonary thromboembolism) (CMS/HCC)    • Shoulder pain, left      Past Surgical History:   Procedure Laterality Date   • Dupuytren contracture release Left     5th finger   • Prostatectomy        Social History     Socioeconomic History   • Marital status: /Civil Union     Spouse name: Not on file   • Number of children: Not on file   • Years of education: Not on file   • Highest education level: Not on file   Occupational History   • Not on file   Social Needs   • Financial resource strain: Not on file   • Food insecurity:     Worry: Not on file     Inability: Not on file   • Transportation needs:     Medical: Not on file     Non-medical: Not on file   Tobacco Use   • Smoking status: Never Smoker   • Smokeless tobacco: Never Used   Substance and Sexual Activity   • Alcohol use: Yes     Alcohol/week: 0.0 - 5.0 standard drinks     Frequency: 2-3 times a week     Drinks per session: 1 or 2     Binge frequency: Never   • Drug use: No   • Sexual activity: Not  on file   Lifestyle   • Physical activity:     Days per week: Not on file     Minutes per session: Not on file   • Stress: Not on file   Relationships   • Social connections:     Talks on phone: Not on file     Gets together: Not on file     Attends [employer] service: Not on file     Active member of club or organization: Not on file     Attends meetings of clubs or organizations: Not on file     Relationship status: Not on file   • Intimate partner violence:     Fear of current or ex partner: Not on file     Emotionally abused: Not on file     Physically abused: Not on file     Forced sexual activity: Not on file   Other Topics Concern   • Not on file   Social History Narrative    The patient states that he smoked into his 20's (10 years).  The patient states that he will have an occasional beer, more in the summer.  He works as a .  The patient is  with 3 children.  One son is in [place].  The patient has a daughter who was a nurse and also lives in [place].        Family History   Family history unknown: Yes       CURRENT MEDICATIONS:   Current Outpatient Medications   Medication Sig Dispense Refill   • simvastatin (ZOCOR) 40 MG tablet Take 1 tablet by mouth nightly. 90 tablet 0   • omeprazole (PRILOSEC) 20 MG capsule Take 1 capsule by mouth daily. 90 capsule 1   • losartan (COZAAR) 100 MG tablet TAKE 1 TABLET BY MOUTH  DAILY 90 tablet 0   • Cetirizine HCl (ZYRTEC PO) Take by mouth as needed.     • ipratropium (ATROVENT) 0.06 % nasal spray Spray 2 sprays in each nostril 2 times daily as needed for Rhinitis.     • ketotifen (ZADITOR) 0.025 % ophthalmic solution Place 1 drop into both eyes as needed.     • loratadine (CLARITIN) 10 MG tablet Take 10 mg by mouth daily.     • aspirin 81 MG tablet Take 81 mg by mouth daily.     • CALCIUM CARBONATE-VITAMIN D PO      • DAILY MULTIPLE VITAMINS PO        No current facility-administered medications for this visit.      ALLERGIES:  Tetanus toxoid;  Pneumococcal vaccine; and Seasonal      PHYSICAL EXAMINATION:  Vitals:    10/08/19 1345   BP: 132/68   Pulse: 74   Resp: 20   Temp: 98.3 °F (36.8 °C)   TempSrc: Tympanic   Weight: 85.8 kg   Height: 5' 9.5\" (1.765 m)     Constitutional: Appears well-developed and well-nourished. No distress.   HENT: Normocephalic, symmetric. External evaluation of ears, nose, and mouth within normal limits.   Neurological: Alert and oriented to person, place, and time.   Skin: Right thumb intact fingernail. Thickened skin with cracking. Split in skin with dried blood. No purulent drainage or erythema.   Psychiatric: Normal mood and affect.    No orders of the defined types were placed in this encounter.      Assessment and Plan:      Open wound of finger without complication, initial encounter  (primary encounter diagnosis)  Plan: No evidence for infection. No indication for sutures or skin glue. Discussed not continuing to pick off skin. No foreign body or fingernail growing into wound bed. Wound was irrigated with sterile saline. Triple antibiotic ointment applied and bandaged with tefla. Dressing supplies were sent with patient. He and wife will change daily for a week. If no improvement may need to see wound care.            foot/left

## 2019-10-12 ENCOUNTER — INPATIENT (INPATIENT)
Facility: HOSPITAL | Age: 84
LOS: 10 days | Discharge: HOSPICE | DRG: 871 | End: 2019-10-23
Attending: INTERNAL MEDICINE | Admitting: INTERNAL MEDICINE
Payer: MEDICARE

## 2019-10-12 VITALS
OXYGEN SATURATION: 88 % | HEART RATE: 138 BPM | TEMPERATURE: 105 F | RESPIRATION RATE: 30 BRPM | WEIGHT: 160.06 LBS | DIASTOLIC BLOOD PRESSURE: 55 MMHG | HEIGHT: 68 IN | SYSTOLIC BLOOD PRESSURE: 113 MMHG

## 2019-10-12 DIAGNOSIS — N17.9 ACUTE KIDNEY FAILURE, UNSPECIFIED: ICD-10-CM

## 2019-10-12 DIAGNOSIS — A41.9 SEPSIS, UNSPECIFIED ORGANISM: ICD-10-CM

## 2019-10-12 DIAGNOSIS — Z71.89 OTHER SPECIFIED COUNSELING: ICD-10-CM

## 2019-10-12 DIAGNOSIS — L97.509 NON-PRESSURE CHRONIC ULCER OF OTHER PART OF UNSPECIFIED FOOT WITH UNSPECIFIED SEVERITY: ICD-10-CM

## 2019-10-12 DIAGNOSIS — T85.598A OTHER MECHANICAL COMPLICATION OF OTHER GASTROINTESTINAL PROSTHETIC DEVICES, IMPLANTS AND GRAFTS, INITIAL ENCOUNTER: ICD-10-CM

## 2019-10-12 DIAGNOSIS — E87.0 HYPEROSMOLALITY AND HYPERNATREMIA: ICD-10-CM

## 2019-10-12 DIAGNOSIS — I10 ESSENTIAL (PRIMARY) HYPERTENSION: ICD-10-CM

## 2019-10-12 DIAGNOSIS — E78.5 HYPERLIPIDEMIA, UNSPECIFIED: ICD-10-CM

## 2019-10-12 DIAGNOSIS — Z29.9 ENCOUNTER FOR PROPHYLACTIC MEASURES, UNSPECIFIED: ICD-10-CM

## 2019-10-12 DIAGNOSIS — F03.90 UNSPECIFIED DEMENTIA WITHOUT BEHAVIORAL DISTURBANCE: ICD-10-CM

## 2019-10-12 LAB
ALBUMIN SERPL ELPH-MCNC: 1.8 G/DL — LOW (ref 3.5–5)
ALP SERPL-CCNC: 65 U/L — SIGNIFICANT CHANGE UP (ref 40–120)
ALT FLD-CCNC: 51 U/L DA — SIGNIFICANT CHANGE UP (ref 10–60)
ANION GAP SERPL CALC-SCNC: 10 MMOL/L — SIGNIFICANT CHANGE UP (ref 5–17)
ANION GAP SERPL CALC-SCNC: 11 MMOL/L — SIGNIFICANT CHANGE UP (ref 5–17)
ANION GAP SERPL CALC-SCNC: 8 MMOL/L — SIGNIFICANT CHANGE UP (ref 5–17)
APPEARANCE UR: CLEAR — SIGNIFICANT CHANGE UP
APTT BLD: 29.9 SEC — SIGNIFICANT CHANGE UP (ref 27.5–36.3)
AST SERPL-CCNC: 55 U/L — HIGH (ref 10–40)
BASE EXCESS BLDV CALC-SCNC: 1.4 MMOL/L — SIGNIFICANT CHANGE UP (ref -2–2)
BASOPHILS # BLD AUTO: 0 K/UL — SIGNIFICANT CHANGE UP (ref 0–0.2)
BASOPHILS NFR BLD AUTO: 0 % — SIGNIFICANT CHANGE UP (ref 0–2)
BILIRUB SERPL-MCNC: 0.4 MG/DL — SIGNIFICANT CHANGE UP (ref 0.2–1.2)
BILIRUB UR-MCNC: NEGATIVE — SIGNIFICANT CHANGE UP
BLOOD GAS COMMENTS, VENOUS: SIGNIFICANT CHANGE UP
BUN SERPL-MCNC: 102 MG/DL — HIGH (ref 7–18)
BUN SERPL-MCNC: 105 MG/DL — HIGH (ref 7–18)
BUN SERPL-MCNC: 122 MG/DL — HIGH (ref 7–18)
CALCIUM SERPL-MCNC: 6.9 MG/DL — LOW (ref 8.4–10.5)
CALCIUM SERPL-MCNC: 7.1 MG/DL — LOW (ref 8.4–10.5)
CALCIUM SERPL-MCNC: 7.9 MG/DL — LOW (ref 8.4–10.5)
CHLORIDE SERPL-SCNC: 125 MMOL/L — HIGH (ref 96–108)
CHLORIDE SERPL-SCNC: 129 MMOL/L — HIGH (ref 96–108)
CHLORIDE SERPL-SCNC: 132 MMOL/L — HIGH (ref 96–108)
CO2 SERPL-SCNC: 18 MMOL/L — LOW (ref 22–31)
CO2 SERPL-SCNC: 22 MMOL/L — SIGNIFICANT CHANGE UP (ref 22–31)
CO2 SERPL-SCNC: 28 MMOL/L — SIGNIFICANT CHANGE UP (ref 22–31)
COLOR SPEC: YELLOW — SIGNIFICANT CHANGE UP
CREAT ?TM UR-MCNC: 70 MG/DL — SIGNIFICANT CHANGE UP
CREAT SERPL-MCNC: 1.79 MG/DL — HIGH (ref 0.5–1.3)
CREAT SERPL-MCNC: 1.92 MG/DL — HIGH (ref 0.5–1.3)
CREAT SERPL-MCNC: 2.06 MG/DL — HIGH (ref 0.5–1.3)
CRP SERPL-MCNC: 4.53 MG/DL — HIGH (ref 0–0.4)
DIFF PNL FLD: NEGATIVE — SIGNIFICANT CHANGE UP
EOSINOPHIL # BLD AUTO: 0 K/UL — SIGNIFICANT CHANGE UP (ref 0–0.5)
EOSINOPHIL NFR BLD AUTO: 0 % — SIGNIFICANT CHANGE UP (ref 0–6)
GLUCOSE SERPL-MCNC: 331 MG/DL — HIGH (ref 70–99)
GLUCOSE SERPL-MCNC: 348 MG/DL — HIGH (ref 70–99)
GLUCOSE SERPL-MCNC: 426 MG/DL — HIGH (ref 70–99)
GLUCOSE UR QL: NEGATIVE — SIGNIFICANT CHANGE UP
HCO3 BLDV-SCNC: 28 MMOL/L — SIGNIFICANT CHANGE UP (ref 21–29)
HCT VFR BLD CALC: 43 % — SIGNIFICANT CHANGE UP (ref 34.5–45)
HGB BLD-MCNC: 12.2 G/DL — SIGNIFICANT CHANGE UP (ref 11.5–15.5)
HOROWITZ INDEX BLDV+IHG-RTO: 21 — SIGNIFICANT CHANGE UP
INR BLD: 1.22 RATIO — HIGH (ref 0.88–1.16)
KETONES UR-MCNC: NEGATIVE — SIGNIFICANT CHANGE UP
LACTATE SERPL-SCNC: 2.3 MMOL/L — HIGH (ref 0.7–2)
LACTATE SERPL-SCNC: 5.3 MMOL/L — CRITICAL HIGH (ref 0.7–2)
LACTATE SERPL-SCNC: 5.3 MMOL/L — CRITICAL HIGH (ref 0.7–2)
LACTATE SERPL-SCNC: 5.5 MMOL/L — CRITICAL HIGH (ref 0.7–2)
LEUKOCYTE ESTERASE UR-ACNC: ABNORMAL
LYMPHOCYTES # BLD AUTO: 1.61 K/UL — SIGNIFICANT CHANGE UP (ref 1–3.3)
LYMPHOCYTES # BLD AUTO: 13 % — SIGNIFICANT CHANGE UP (ref 13–44)
MCHC RBC-ENTMCNC: 28.1 PG — SIGNIFICANT CHANGE UP (ref 27–34)
MCHC RBC-ENTMCNC: 28.4 GM/DL — LOW (ref 32–36)
MCV RBC AUTO: 99.1 FL — SIGNIFICANT CHANGE UP (ref 80–100)
MONOCYTES # BLD AUTO: 0.25 K/UL — SIGNIFICANT CHANGE UP (ref 0–0.9)
MONOCYTES NFR BLD AUTO: 2 % — SIGNIFICANT CHANGE UP (ref 2–14)
NEUTROPHILS # BLD AUTO: 10.56 K/UL — HIGH (ref 1.8–7.4)
NEUTROPHILS NFR BLD AUTO: 82 % — HIGH (ref 43–77)
NITRITE UR-MCNC: NEGATIVE — SIGNIFICANT CHANGE UP
PCO2 BLDV: 52 MMHG — HIGH (ref 35–50)
PH BLDV: 7.34 — LOW (ref 7.35–7.45)
PH UR: 6 — SIGNIFICANT CHANGE UP (ref 5–8)
PLATELET # BLD AUTO: 86 K/UL — LOW (ref 150–400)
PO2 BLDV: 44 MMHG — SIGNIFICANT CHANGE UP (ref 25–45)
POTASSIUM SERPL-MCNC: 4.2 MMOL/L — SIGNIFICANT CHANGE UP (ref 3.5–5.3)
POTASSIUM SERPL-MCNC: 4.6 MMOL/L — SIGNIFICANT CHANGE UP (ref 3.5–5.3)
POTASSIUM SERPL-MCNC: 4.7 MMOL/L — SIGNIFICANT CHANGE UP (ref 3.5–5.3)
POTASSIUM SERPL-SCNC: 4.2 MMOL/L — SIGNIFICANT CHANGE UP (ref 3.5–5.3)
POTASSIUM SERPL-SCNC: 4.6 MMOL/L — SIGNIFICANT CHANGE UP (ref 3.5–5.3)
POTASSIUM SERPL-SCNC: 4.7 MMOL/L — SIGNIFICANT CHANGE UP (ref 3.5–5.3)
PROT SERPL-MCNC: 6.7 G/DL — SIGNIFICANT CHANGE UP (ref 6–8.3)
PROT UR-MCNC: 30 MG/DL
PROTHROM AB SERPL-ACNC: 13.6 SEC — HIGH (ref 10–12.9)
RBC # BLD: 4.34 M/UL — SIGNIFICANT CHANGE UP (ref 3.8–5.2)
RBC # FLD: 18.7 % — HIGH (ref 10.3–14.5)
SAO2 % BLDV: 74 % — SIGNIFICANT CHANGE UP (ref 67–88)
SODIUM SERPL-SCNC: 160 MMOL/L — CRITICAL HIGH (ref 135–145)
SODIUM SERPL-SCNC: 161 MMOL/L — CRITICAL HIGH (ref 135–145)
SODIUM SERPL-SCNC: 162 MMOL/L — CRITICAL HIGH (ref 135–145)
SODIUM UR-SCNC: 49 MMOL/L — SIGNIFICANT CHANGE UP (ref 40–220)
SP GR SPEC: 1.01 — SIGNIFICANT CHANGE UP (ref 1.01–1.02)
UROBILINOGEN FLD QL: NEGATIVE — SIGNIFICANT CHANGE UP
WBC # BLD: 12.42 K/UL — HIGH (ref 3.8–10.5)
WBC # FLD AUTO: 12.42 K/UL — HIGH (ref 3.8–10.5)

## 2019-10-12 PROCEDURE — 73620 X-RAY EXAM OF FOOT: CPT | Mod: 26,LT

## 2019-10-12 PROCEDURE — 71045 X-RAY EXAM CHEST 1 VIEW: CPT | Mod: 26

## 2019-10-12 PROCEDURE — 99291 CRITICAL CARE FIRST HOUR: CPT | Mod: 25

## 2019-10-12 PROCEDURE — 43753 TX GASTRO INTUB W/ASP: CPT

## 2019-10-12 RX ORDER — ACETAMINOPHEN 500 MG
650 TABLET ORAL ONCE
Refills: 0 | Status: COMPLETED | OUTPATIENT
Start: 2019-10-12 | End: 2019-10-12

## 2019-10-12 RX ORDER — HEPARIN SODIUM 5000 [USP'U]/ML
5000 INJECTION INTRAVENOUS; SUBCUTANEOUS EVERY 12 HOURS
Refills: 0 | Status: DISCONTINUED | OUTPATIENT
Start: 2019-10-12 | End: 2019-10-13

## 2019-10-12 RX ORDER — SODIUM CHLORIDE 9 MG/ML
1000 INJECTION INTRAMUSCULAR; INTRAVENOUS; SUBCUTANEOUS
Refills: 0 | Status: DISCONTINUED | OUTPATIENT
Start: 2019-10-12 | End: 2019-10-12

## 2019-10-12 RX ORDER — MEROPENEM 1 G/30ML
500 INJECTION INTRAVENOUS EVERY 12 HOURS
Refills: 0 | Status: DISCONTINUED | OUTPATIENT
Start: 2019-10-13 | End: 2019-10-23

## 2019-10-12 RX ORDER — VANCOMYCIN HCL 1 G
1000 VIAL (EA) INTRAVENOUS ONCE
Refills: 0 | Status: COMPLETED | OUTPATIENT
Start: 2019-10-12 | End: 2019-10-12

## 2019-10-12 RX ORDER — PIPERACILLIN AND TAZOBACTAM 4; .5 G/20ML; G/20ML
3.38 INJECTION, POWDER, LYOPHILIZED, FOR SOLUTION INTRAVENOUS ONCE
Refills: 0 | Status: COMPLETED | OUTPATIENT
Start: 2019-10-12 | End: 2019-10-12

## 2019-10-12 RX ORDER — CEFEPIME 1 G/1
2000 INJECTION, POWDER, FOR SOLUTION INTRAMUSCULAR; INTRAVENOUS ONCE
Refills: 0 | Status: COMPLETED | OUTPATIENT
Start: 2019-10-12 | End: 2019-10-12

## 2019-10-12 RX ORDER — SIMVASTATIN 20 MG/1
1 TABLET, FILM COATED ORAL
Qty: 0 | Refills: 0 | DISCHARGE

## 2019-10-12 RX ORDER — SODIUM CHLORIDE 9 MG/ML
1000 INJECTION, SOLUTION INTRAVENOUS
Refills: 0 | Status: DISCONTINUED | OUTPATIENT
Start: 2019-10-12 | End: 2019-10-13

## 2019-10-12 RX ORDER — ALBUMIN HUMAN 25 %
50 VIAL (ML) INTRAVENOUS EVERY 6 HOURS
Refills: 0 | Status: COMPLETED | OUTPATIENT
Start: 2019-10-12 | End: 2019-10-13

## 2019-10-12 RX ORDER — CITALOPRAM 10 MG/1
1 TABLET, FILM COATED ORAL
Qty: 0 | Refills: 0 | DISCHARGE

## 2019-10-12 RX ORDER — METRONIDAZOLE 500 MG
500 TABLET ORAL EVERY 8 HOURS
Refills: 0 | Status: DISCONTINUED | OUTPATIENT
Start: 2019-10-12 | End: 2019-10-12

## 2019-10-12 RX ORDER — METRONIDAZOLE 500 MG
500 TABLET ORAL ONCE
Refills: 0 | Status: COMPLETED | OUTPATIENT
Start: 2019-10-12 | End: 2019-10-12

## 2019-10-12 RX ORDER — SODIUM CHLORIDE 9 MG/ML
2300 INJECTION INTRAMUSCULAR; INTRAVENOUS; SUBCUTANEOUS ONCE
Refills: 0 | Status: COMPLETED | OUTPATIENT
Start: 2019-10-12 | End: 2019-10-12

## 2019-10-12 RX ADMIN — HEPARIN SODIUM 5000 UNIT(S): 5000 INJECTION INTRAVENOUS; SUBCUTANEOUS at 18:45

## 2019-10-12 RX ADMIN — SODIUM CHLORIDE 2300 MILLILITER(S): 9 INJECTION INTRAMUSCULAR; INTRAVENOUS; SUBCUTANEOUS at 10:05

## 2019-10-12 RX ADMIN — PIPERACILLIN AND TAZOBACTAM 200 GRAM(S): 4; .5 INJECTION, POWDER, LYOPHILIZED, FOR SOLUTION INTRAVENOUS at 10:05

## 2019-10-12 RX ADMIN — Medication 650 MILLIGRAM(S): at 10:07

## 2019-10-12 RX ADMIN — Medication 650 MILLIGRAM(S): at 12:24

## 2019-10-12 RX ADMIN — SODIUM CHLORIDE 100 MILLILITER(S): 9 INJECTION, SOLUTION INTRAVENOUS at 16:28

## 2019-10-12 RX ADMIN — PIPERACILLIN AND TAZOBACTAM 3.38 GRAM(S): 4; .5 INJECTION, POWDER, LYOPHILIZED, FOR SOLUTION INTRAVENOUS at 10:35

## 2019-10-12 RX ADMIN — Medication 100 MILLIGRAM(S): at 12:00

## 2019-10-12 RX ADMIN — CEFEPIME 100 MILLIGRAM(S): 1 INJECTION, POWDER, FOR SOLUTION INTRAMUSCULAR; INTRAVENOUS at 14:16

## 2019-10-12 RX ADMIN — SODIUM CHLORIDE 2300 MILLILITER(S): 9 INJECTION INTRAMUSCULAR; INTRAVENOUS; SUBCUTANEOUS at 11:05

## 2019-10-12 RX ADMIN — Medication 250 MILLIGRAM(S): at 10:46

## 2019-10-12 RX ADMIN — Medication 1000 MILLIGRAM(S): at 12:24

## 2019-10-12 RX ADMIN — Medication 50 MILLILITER(S): at 18:45

## 2019-10-12 NOTE — ED PROVIDER NOTE - CARE PLAN
Principal Discharge DX:	Severe sepsis  Secondary Diagnosis:	Left lower lobe pneumonia  Secondary Diagnosis:	Feeding tube dysfunction  Secondary Diagnosis:	GAYLE (acute kidney injury)  Secondary Diagnosis:	Hypernatremia

## 2019-10-12 NOTE — CONSULT NOTE ADULT - SUBJECTIVE AND OBJECTIVE BOX
Patient is a 89y old  Female who presents with a chief complaint of respiratory distress, fever (12 Oct 2019 16:04)      HPI:  89 year old female patient, bedbound, non verbal AAO x0 at baseline from [facility], with PMH significant for HTN, DM, HLD and dementia is sent from NH due to fever of 102F, congestion and oxygen saturation of 81%. On arrival, patient was noted to have rectal temperature of 105F, , O2 sat of 88%. She was placed on non-rebreather which improved SPO2 to 95%. In ED, it was noted that G tube was not draining and was erythematous no purulent discharge was noted. G-tube was replaced by ED provider. Her UA was negative, chest Xray showed: new left sided infiltrate at base.  As per charts, patient was being treated with zosyn for osteomyelitis of left foot for last 2 days. However, she continued to spike fever.     History was limited, patient's family was at bedside. Had detailed discussion about GOC, she is DNR/DNI, no pressor support or central line. (12 Oct 2019 12:37)    Seen bedside this PM as a consult for left heel ulcer.  Patient was not alert, aware, nor oriented.  Pt's family was bedside to give history.  Patient has been in a nursing home and had local wound care performed there.  They were unaware of how large the wound in the left heel was.  The family states their desire for us to continue with local wound care and consider other interventions later next week once they have a better idea of her overall health.        PMH:Dementia  HTN (hypertension)  Hyperlipidemia    Allergies: No Known Allergies    Medications: albumin human 25% IVPB 50 milliLiter(s) IV Intermittent every 6 hours  dextrose 5%. 1000 milliLiter(s) IV Continuous <Continuous>  heparin  Injectable 5000 Unit(s) SubCutaneous every 12 hours  metroNIDAZOLE  IVPB 500 milliGRAM(s) IV Intermittent every 8 hours    FH:  PSX: No significant past surgical history    SH: albumin human 25% IVPB 50 milliLiter(s) IV Intermittent every 6 hours  dextrose 5%. 1000 milliLiter(s) IV Continuous <Continuous>  heparin  Injectable 5000 Unit(s) SubCutaneous every 12 hours  metroNIDAZOLE  IVPB 500 milliGRAM(s) IV Intermittent every 8 hours      Vital Signs Last 24 Hrs  T(C): 37.2 (12 Oct 2019 16:52), Max: 40.7 (12 Oct 2019 09:48)  T(F): 98.9 (12 Oct 2019 16:52), Max: 105.2 (12 Oct 2019 09:48)  HR: 78 (12 Oct 2019 16:52) (77 - 138)  BP: 95/47 (12 Oct 2019 16:52) (85/66 - 113/55)  BP(mean): --  RR: 20 (12 Oct 2019 16:52) (20 - 36)  SpO2: 95% (12 Oct 2019 16:52) (88% - 98%)    LABS                        12.2   12.42 )-----------( 86       ( 12 Oct 2019 10:03 )             43.0               10-12    160<HH>  |  132<H>  |  102<H>  ----------------------------<  348<H>  4.6   |  18<L>  |  1.79<H>    Ca    7.1<L< from: Xray Foot AP + Lateral, Left (10.12.19 @ 13:08) >    EXAM:  FOOT 2VIEWS LT                            PROCEDURE DATE:  10/12/2019          INTERPRETATION:  Radiographs of the left foot         CLINICAL INFORMATION:  Concern for osteomyelitis sepsis    TECHNIQUE: Vertebral AP and lateral views of the foot were obtained.    FINDINGS:   No prior similar studies are available for review.    The forefoot demonstrates no evidence of fracture. Normal alignment is   seen. Joint spaces are preserved. Sesamoids are intact.    The midfoot appears intact.    The hindfoot demonstrates no plantar calcaneal spur.    There is soft tissue ulceration seen at the level of the heel. There is   focal sclerosis of the posterior calcaneus and heterogeneous density of   the bony structures diffusely.    IMPRESSION: Diffuse osteopenia and heterogeneity in the density of the   bony structures. There is more focal increased density at the level of   the calcaneus where the soft tissue ulcer is seen. Vasculitis cannot be   excluded and recommend correlation with MR imaging of the foot       EUGENE EMERSON M.D.,ATTENDING RADIOLOGIST  This document has been electronically signed. Oct 12 2019  2:35PM    < end of copied text >    >      12 Oct 2019 14:45    TPro  6.7  /  Alb  1.8<L>  /  TBili  0.4  /  DBili  x   /  AST  55<H>  /  ALT  51  /  AlkPhos  65  10-12      PHYSICAL EXAM  GEN: MILTON GUIDRY is a pleasant well-nourished, well developed 89y Female in no acute distress, alert awake, and oriented to person, place and time.   LE Focused:    Vasc:  DP and PT pulses non-palpable b/l, +2 pitting edema noted b/l.  CFT delayed 5 seconds to digits.  Derm: Left heel ulcer measuring 5.0cm x 4.6cm x 0.5cm with red granular base and small area of fibrotic tissue. Probes to bone.  No malodor, no purulence, no increase in warmth, minimal periwound erythema.    Neuro: unable to assess  MSK: Patient is in an overall contracted position        Cultures:  taken 10/12: pending    A: Left heel ulcer down to the level of bone with likely underlying osteomyelitis      P:  Patient evaluated, chart reviewed  Xrays reviewed- as above  Wound flushed with copious amounts of sterile saline  Culture taken of left heel ulcer 10/12- pending  Wound dressed with 4x4 gauzeCarmen  Recommend dressing changes with santyl, 4x4 gauzeCarmen  Recommend continued Heel offloading in bed  Recommend IV antibiotics per primary team or ID  Consider MRI later next week to further evaluate extent of affected bones pending patient's overall health  Podiatry will follow while in-house  Will consider bedside bone biopsy pending patient's overall health, ID recommendations, and further discussion with the family  Discussed with attending Dr. Mcfadden

## 2019-10-12 NOTE — H&P ADULT - PROBLEM SELECTOR PLAN 6
-Patient has dementia , c/w Namenda once G tube placement confirmed. -Patient has dementia , c/w Namenda once G tube placement confirmed.  pending official read. -Patient has dementia , c/w Namenda once G tube placement confirmed,   pending official read.

## 2019-10-12 NOTE — ED PROVIDER NOTE - CLINICAL SUMMARY MEDICAL DECISION MAKING FREE TEXT BOX
89 F BIB EMS for respiratory distress, fever and known UTI. Appears very altered. Pt is DNR/DNI. Rectal temp 105.2 in ED, pt septic. Will get labs, CXR, give empiric abx, IV fluids and reassess.

## 2019-10-12 NOTE — H&P ADULT - PROBLEM SELECTOR PLAN 2
-Patient is noted to have hypernatremia of 161   -Likely secondary to dehydration and fever   -Follow urine lytes   -Free water deficit- 4.9L   -Follow BMP   -C/w free water once G tube placement confirmed. -Patient is noted to have hypernatremia of 161   -Likely secondary to dehydration and fever   -Follow urine lytes   -Free water deficit- 4.9L   -Follow BMP   -C/w D5   -C/w free water once G tube placement confirmed.  -nephrologist Dr Madrigal consulted. -Patient is noted to have hypernatremia of 161   -Likely secondary to dehydration and fever   -Follow urine lytes   -Free water deficit- 4.9L   -Follow BMP q 6 for now   -C/w D5   -C/w free water  via G tube   -nephrologist Dr Madrigal consulted.

## 2019-10-12 NOTE — H&P ADULT - ASSESSMENT
89 year old female patient, bedbound, non verbal AAO x0 at baseline from [facility], with PMH significant for HTN, DM, HLD and dementia is sent from NH due to fever of 102F, congestion and oxygen saturation of 81%. On arrival, patient was noted to have rectal temperature of 105F, , O2 sat of 88%. She was placed on non-rebreather which improved SPO2 to 95%.    In ED, it was noted that G tube was not draining and was erythematous no purulent discharge was noted. G-tube was replaced by ED provider. Her UA was negative, chest X-ray showed: new left sided infiltrate at base.  As per charts, patient was being treated with zosyn for osteomyelitis of left foot for last 2 days. However, she continued to spike fever.      Patient is admitted for severe sepsis. 89 year old female patient, bedbound, non verbal AAO x0 at baseline from [facility], with PMH significant for HTN, DM, HLD and dementia is sent from NH due to fever of 102F, congestion and oxygen saturation of 81%. On arrival, patient was noted to have rectal temperature of 105F, , O2 sat of 88%. She was placed on non-rebreather which improved SPO2 to 95%.    In ED, it was noted that G tube was not draining and was erythematous no purulent discharge was noted. G-tube was replaced by ED provider. Her UA was negative, chest X-ray showed: new left sided infiltrate at base.  As per charts, patient was being treated with zosyn for osteomyelitis of left foot for last 2 days. However, she continued to spike fever.      Patient is admitted for severe sepsis and hypernatremia 89 year old female patient, bedbound, non verbal AAO x0 at baseline from [facility], with PMH significant for HTN, DM, HLD and dementia is sent from NH due to fever of 102F, congestion and oxygen saturation of 81%. On arrival, patient was noted to have rectal temperature of 105F, , O2 sat of 88%. She was placed on non-rebreather which improved SPO2 to 95%.    In ED, it was noted that G tube was not draining and was erythematous no purulent discharge was noted. G-tube was replaced by ED provider. Her UA was negative, chest X-ray showed: new left sided infiltrate at base.  As per charts, patient was being treated with zosyn for osteomyelitis of left foot for last 2 days. However, she continued to spike fever.   Patient has abdominal distention, xray abdomen shows- fecal impaction, gastrostomy tube in place- confirmed with radiologist Lacey.      Patient is admitted for severe sepsis and hypernatremia 89 year old female patient, bedbound, non verbal AAO x0 at baseline from [facility], with PMH significant for HTN, DM, HLD and dementia is sent from NH due to fever of 102F, congestion and oxygen saturation of 81%. On arrival, patient was noted to have rectal temperature of 105F, , O2 sat of 88%. She was placed on non-rebreather which improved SPO2 to 95%.    In ED, it was noted that G tube was not draining and was erythematous no purulent discharge was noted. G-tube was replaced by ED provider. Her UA was negative, chest X-ray showed: new left sided infiltrate at base.  As per charts, patient was being treated with zosyn for osteomyelitis of left foot for last 2 days. However, she continued to spike fever.   Patient has abdominal distention, xray abdomen shows- fecal impaction, gastrostomy tube in place no signs of obstruction - confirmed with radiologist Lacey.      Patient is admitted for severe sepsis and hypernatremia

## 2019-10-12 NOTE — H&P ADULT - NSHPPHYSICALEXAM_GEN_ALL_CORE
CONSTITUTIONAL: Elderly female on non rebreather.   ENMT: Airway patent, Nasal mucosa clear. Mouth with dry  mucosa. Th  EYES: Clear bilaterally, pupils equal, round and reactive to light. EOM cannot be assessed   CARDIAC: Normal rate, regular rhythm.  Heart sounds S1, S2.  No murmurs, rubs or gallops   RESPIRATORY: Breath sounds coarse b/l   EXTREMITIES: No edema, contracted extremities   NEUROLOGICAL: Alert and oriented x 0, no response to painful stimuli.   SKIN: No rash, skin turgor CONSTITUTIONAL: Elderly female on non rebreather, tachypneic   ENMT: Airway patent, Nasal mucosa clear. Mouth with dry  mucosa.   EYES: Clear bilaterally, pupils equal, round and reactive to light. EOM cannot be assessed   CARDIAC: Normal rate, regular rhythm.  Heart sounds S1, S2.  No murmurs, rubs or gallops   RESPIRATORY: Breath sounds coarse b/l   EXTREMITIES: No edema, contracted extremities , ulcer on left foot covered with bandage.   NEUROLOGICAL: Alert and oriented x 0, no response to painful stimuli.   SKIN: No rash, skin turgor CONSTITUTIONAL: Elderly female on non rebreather, tachypneic   ENMT: Airway patent, Nasal mucosa clear. Mouth with dry  mucosa.   EYES: Clear bilaterally, pupils equal, round and reactive to light. EOM cannot be assessed   CARDIAC: Normal rate, regular rhythm.  Heart sounds S1, S2.  No murmurs, rubs or gallops   RESPIRATORY: Breath sounds coarse b/l   EXTREMITIES: No edema, contracted extremities , ulcer on left foot covered with bandage.   NEUROLOGICAL: Alert and oriented x 0, no response to painful stimuli.   SKIN: No rash, skin turgor  Abdomen- soft, distended.

## 2019-10-12 NOTE — ED ADULT NURSE NOTE - NSIMPLEMENTINTERV_GEN_ALL_ED
Implemented All Fall with Harm Risk Interventions:  [place] to call system. Call bell, personal items and telephone within reach. Instruct patient to call for assistance. Room bathroom lighting operational. Non-slip footwear when patient is off stretcher. Physically safe environment: no spills, clutter or unnecessary equipment. Stretcher in lowest position, wheels locked, appropriate side rails in place. Provide visual cue, wrist band, yellow gown, etc. Monitor gait and stability. Monitor for mental status changes and reorient to person, place, and time. Review medications for side effects contributing to fall risk. Reinforce activity limits and safety measures with patient and family. Provide visual clues: red socks.

## 2019-10-12 NOTE — ED PROVIDER NOTE - PROGRESS NOTE DETAILS
Disc with mauricio Lobo add cefepime and flagyl given fever w zosyn. to continue iv abx and fluids, no escalation of care. PEG tube replaced, will obtain XR and reassess.

## 2019-10-12 NOTE — H&P ADULT - PROBLEM SELECTOR PLAN 9
Goals of care were discussed in detail with patient's daughter Ms. Anya Elkins [phone], [phone]. She wants her mother to be comfortable but states that she is a fighter and hopes that she will get better. She requested her to be DNR/DNI, no central line and no pressors to be given. She was informed about her mother's medical condition and that prognosis remains poor. Patient's brother Mr Rosa [phone] was also present during discussion. Support was provided.

## 2019-10-12 NOTE — H&P ADULT - PROBLEM SELECTOR PLAN 1
-Patient meets Sepsis criteria: WBC >12k, HR >90, RR>20, Temp >100.4F with PNA /osteomyelitis as suspected source of infection and lactic acidosis.   -S/p 2300cc IVF bolus and IV vancomycin, zosyn, metronidazole and cefepime in ED   -Follow blood cx and urine cx   -Follow lactic acid   -C/w IVF   -C/w broad spectrum antibiotics.   -Patient's overall prognosis remains poor, palliative care consulted.   -ID consult- Dr. Rick -Patient meets Sepsis criteria: WBC >12k, HR >90, RR>20, Temp >100.4F with PNA /osteomyelitis VS intra-abdominal pathology as suspected source of infection and lactic acidosis.   -S/p 2300cc IVF bolus and IV vancomycin, zosyn, metronidazole and cefepime in ED   -Follow blood cx and urine cx   -Follow lactic acid   -C/w IVF   -C/w broad spectrum antibiotics.   -Patient's overall prognosis remains poor, palliative care consulted.   -ID consult- Dr. Rick

## 2019-10-12 NOTE — H&P ADULT - PROBLEM SELECTOR PLAN 4
-Patient's G tube was not functioning, looked erythematous   -G tube replaced by ED provider  -Resume feeds once placement confirmed. -Patient's G tube was not functioning, looked erythematous   -G tube replaced by ED provider  -Resume feeds once stable. Will keep NPO for now.

## 2019-10-12 NOTE — H&P ADULT - PROBLEM SELECTOR PLAN 5
-Patient has left foot ulcer present, was being treated with zosyn at NH for presumed osteomyelitis   -X-ray foot- Diffuse osteopenia and heterogeneity in the density of the   bony structures. There is more focal increased density at the level of   the calcaneus where the soft tissue ulcer is seen. Vasculitis cannot be   excluded.  -Follow ESR, C/w broad spectrum abx   -Podiatry consulted.

## 2019-10-12 NOTE — ED PROVIDER NOTE - OBJECTIVE STATEMENT
89 y/o female with PMHx of HTN, HLD, dementia BIB EMS to the ED from [facility] c/o respiratory distress x today. Pt noted to be in respiratory distress this morning with a fever of 102.8 at 7AM. Pt has been on IV abx for an UTI since yesterday. Upon EMS arrival, pt was aspirating with O2SAT of 88% on room air and hypertensive; pt was gurgling. Pt was suctioned for 5 mins. Pt was given Solumedrol and duonebs x3 in the field, O2SAT up to 94% on nonrebreather at the highest. Pt also noted to have a high sugar. Pt with g-tube in place. Pt is DNR/DNI. NKDA. 89 y/o female with PMHx of HTN, HLD, dementia BIB EMS to the ED from [facility] c/o respiratory distress x today. Pt noted to be in respiratory distress this morning with a fever of 102.8 at 7AM. Pt has been on IV abx for an UTI since yesterday. Upon EMS arrival, pt was aspirating with O2SAT of 88% on room air and hypertensive; pt was gurgling. Pt was suctioned for 5 mins. Pt was given Solumedrol and duonebs x3 in the field, O2SAT up to 94% on nonrebreather at the highest. Pt with g-tube in place. Pt is DNR/DNI. NKDA. Currently being treated for osteomyelitis with zosyn. 89 y/o female with PMHx of HTN, HLD, dementia BIB EMS to the ED from [facility] c/o respiratory distress x today. Pt noted to be in respiratory distress this morning with a fever of 102.8 at 7AM. Pt has been on IV abx for an UTI since yesterday. Upon EMS arrival, pt was aspirating with O2SAT of 88% on room air and hypertensive; pt was gurgling. Pt was suctioned for 5 mins. Pt was given Solumedrol and duonebs x3 in the field, O2SAT up to 94% on nonrebreather at the highest. Pt with g-tube in place. Pt is DNR/DNI. NKDA. Currently being treated for osteomyelitis with zosyn started on 12/10.

## 2019-10-12 NOTE — H&P ADULT - PROBLEM SELECTOR PLAN 8
-Patient is on statin  -Resume statin once G tube placement confirmed. -Patient is on statin  -Resume statin once G tube placement confirmed. pending official read. -Patient is on statin  -Resume statin once G tube placement confirmed, pending official read.

## 2019-10-12 NOTE — PROGRESS NOTE ADULT - SUBJECTIVE AND OBJECTIVE BOX
HPI:  89 year old female patient, bedbound, non verbal AAO x0 at baseline from [facility], with PMH significant for HTN, DM, HLD and dementia is sent from NH due to fever of 102F, congestion and oxygen saturation of 81%. On arrival, patient was noted to have rectal temperature of 105F, , O2 sat of 88%. She was placed on non-rebreather which improved SPO2 to 95%. In ED, it was noted that G tube was not draining and was erythematous no purulent discharge was noted. G-tube was replaced by ED provider. Her UA was negative, chest Xray showed: new left sided infiltrate at base.  As per charts, patient was being treated with zosyn for osteomyelitis of left foot for last 2 days. However, she continued to spike fever.     History was limited, patient's family was at bedside. Had detailed discussion about GOC, she is DNR/DNI, no pressor support or central line. (12 Oct 2019 12:37)      Patient is a 89y old  Female who presents with a chief complaint of respiratory distress, fever (12 Oct 2019 12:37)      INTERVAL HPI/OVERNIGHT EVENTS:  T(C): 37.2 (10-12-19 @ 12:39), Max: 40.7 (10-12-19 @ 09:48)  HR: 77 (10-12-19 @ 13:29) (77 - 138)  BP: 104/48 (10-12-19 @ 13:29) (85/66 - 113/55)  RR: 20 (10-12-19 @ 13:29) (20 - 36)  SpO2: 97% (10-12-19 @ 13:29) (88% - 98%)  Wt(kg): --  I&O's Summary      REVIEW OF SYSTEMS: denies fever, chills, SOB, palpitations, chest pain, abdominal pain, nausea, vomitting, diarrhea, constipation, dizziness    MEDICATIONS  (STANDING):  dextrose 5%. 1000 milliLiter(s) (100 mL/Hr) IV Continuous <Continuous>  heparin  Injectable 5000 Unit(s) SubCutaneous every 12 hours  metroNIDAZOLE  IVPB 500 milliGRAM(s) IV Intermittent every 8 hours    MEDICATIONS  (PRN):      PHYSICAL EXAM:  GENERAL: NAD, well-groomed, well-developed  HEAD:  Atraumatic, Normocephalic  EYES: EOMI, PERRLA, conjunctiva and sclera clear  ENMT: No tonsillar erythema, exudates, or enlargement; Moist mucous membranes, Good dentition, No lesions  NECK: Supple, No JVD, Normal thyroid  NERVOUS SYSTEM:  Alert & Oriented X3, Good concentration; Motor Strength 5/5 B/L upper and lower extremities; DTRs 2+ intact and symmetric  CHEST/LUNG: Clear to percussion bilaterally; No rales, rhonchi, wheezing, or rubs  HEART: Regular rate and rhythm; No murmurs, rubs, or gallops  ABDOMEN: Soft, Nontender, Nondistended; Bowel sounds present  EXTREMITIES:  2+ Peripheral Pulses, No clubbing, cyanosis, or edema  LYMPH: No lymphadenopathy noted  SKIN: No rashes or lesions  LABS:                        12.2   12.42 )-----------( 86       ( 12 Oct 2019 10:03 )             43.0     10-12    160<HH>  |  132<H>  |  102<H>  ----------------------------<  348<H>  4.6   |  18<L>  |  1.79<H>    Ca    7.1<L>      12 Oct 2019 14:45    TPro  6.7  /  Alb  1.8<L>  /  TBili  0.4  /  DBili  x   /  AST  55<H>  /  ALT  51  /  AlkPhos  65  10-12    PT/INR - ( 12 Oct 2019 10:03 )   PT: 13.6 sec;   INR: 1.22 ratio         PTT - ( 12 Oct 2019 10:03 )  PTT:29.9 sec  Urinalysis Basic - ( 12 Oct 2019 10:20 )    Color: Yellow / Appearance: Clear / S.010 / pH: x  Gluc: x / Ketone: Negative  / Bili: Negative / Urobili: Negative   Blood: x / Protein: 30 mg/dL / Nitrite: Negative   Leuk Esterase: Small / RBC: 0-2 /HPF / WBC 6-10 /HPF   Sq Epi: x / Non Sq Epi: x / Bacteria: Trace /HPF      CAPILLARY BLOOD GLUCOSE      POCT Blood Glucose.: 272 mg/dL (12 Oct 2019 09:47)        Urinalysis Basic - ( 12 Oct 2019 10:20 )    Color: Yellow / Appearance: Clear / S.010 / pH: x  Gluc: x / Ketone: Negative  / Bili: Negative / Urobili: Negative   Blood: x / Protein: 30 mg/dL / Nitrite: Negative   Leuk Esterase: Small / RBC: 0-2 /HPF / WBC 6-10 /HPF   Sq Epi: x / Non Sq Epi: x / Bacteria: Trace /HPF

## 2019-10-12 NOTE — ED PROCEDURE NOTE - CPROC ED INFORMED CONSENT1
This was an emergent procedure and consent was implied. Benefits, risks, and possible complications of procedure explained to patient/caregiver who verbalized understanding and gave verbal consent./Daughter, proxy

## 2019-10-12 NOTE — ED PROVIDER NOTE - CRITICAL CARE PROVIDED
consultation with other physicians/documentation/consult w/ pt's family directly relating to pts condition/interpretation of diagnostic studies/direct patient care (not related to procedure)

## 2019-10-12 NOTE — H&P ADULT - PROBLEM SELECTOR PLAN 3
-Patient is noted to have GAYLE  -Baseline creatinine normal.   -BUN/Cr 122/2.06  -C./w IVF -Patient is noted to have GAYLE   -Baseline creatinine normal.   -BUN/Cr 122/2.06  -C./w IVF

## 2019-10-12 NOTE — PATIENT PROFILE ADULT - NSPROPOAPRESSUREINJURY_GEN_A_NUR
Occupational Therapy Evaluation      Lakeshia Rasmussen    10/12/2019    Principal Problem:    Chronic atrial fibrillation  Active Problems:    COPD (chronic obstructive pulmonary disease) (HCC)    Diarrhea    Chronic anxiety    Chronic low back pain    Obesity    Chronic respiratory failure with hypoxia (HCC)    Debility    Positive culture findings in sputum      Past Medical History:   Diagnosis Date    Asthma     Atrial fibrillation     Blurred vision     Cardiac disease     Colitis     COPD (chronic obstructive pulmonary disease)     Diverticulosis     Emphysema lung        Past Surgical History:   Procedure Laterality Date    BLADDER SURGERY      COLON SURGERY      COLONOSCOPY W/ POLYPECTOMY N/A 1/21/2019    Procedure: COLONOSCOPY W/ POLYPECTOMY;  Surgeon: Alfredo Chavez MD;  Location: [facility]; Service: General    SMALL INTESTINE SURGERY          10/12/19 1030   Note Type   Note type Eval only   Restrictions/Precautions   Weight Bearing Precautions Per Order No   Other Precautions Contact/isolation;O2;Fall Risk;Pain  (3L O2)   Pain Assessment   Pain Assessment 0-10   Pain Score 9   Pain Type Chronic pain   Pain Location Back   Home Living   Type of [address] [place] [address] One level;Performs ADLs on one level;Stairs to enter with rails; Able to live on main level with bedroom/bathroom  ([address])   Bathroom Shower/Tub Tub/shower unit   Bathroom Toilet Standard   Bathroom Equipment Grab bars in shower;Grab bars around toilet   [address]   (none)   Prior Function   Level of [place] Independent with ADLs and functional mobility; Needs assistance with IADLs   Lives With Alone   Receives Help From Friend(s); Neighbor   ADL Assistance Independent   IADLs Needs assistance  (transportation)   Falls in the last 6 months 0   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)   ADL   Eating Assistance 7  Independent   Grooming Assistance 7  [address] Bathing Assistance 5  Supervision/Setup   LB Bathing Assistance 3  Moderate Assistance   UB Dressing Assistance 4  Minimal Assistance   LB Dressing Assistance 3  Moderate Assistance   Bed Mobility   Additional Comments Pt seated OOB upon arrival   Transfers   Sit to Stand 4  Minimal assistance   Additional items Assist x 1; Armrests; Increased time required;Verbal cues   Stand to Sit 4  Minimal assistance   Additional items Assist x 1; Armrests; Increased time required;Verbal cues   Additional Comments Pt reports "that's all I can do" s/p sit>stand transfer  Unable to take steps due to pain  Balance   Static Sitting Good   Dynamic Sitting Fair +   Static Standing Fair   Activity Tolerance   Activity Tolerance Patient limited by fatigue;Patient limited by pain   Nurse Made Aware Yes   RUE Assessment   RUE Assessment WFL   LUE Assessment   LUE Assessment WFL   Hand Function   Gross Motor Coordination Functional   Fine Motor Coordination Functional   Cognition   Overall Cognitive Status WFL   Arousal/Participation Alert; Cooperative   Attention Within functional limits   Orientation Level Oriented X4   Memory Decreased short term memory   Following Commands Follows one step commands with increased time or repetition   Comments Mini-Cog assessment performed today  Version 1 was given  Patient able to recall 1/3 words  Patient able to draw an abnormal clock with the incorrect time  Total score of test was 1/5 indicating  further screening of cognition is recommended  Assessment   Limitation Decreased Safe judgement during ADL;Decreased cognition;Decreased endurance;Decreased self-care trans;Decreased high-level ADLs   Prognosis Good   Assessment Pt is a 80 y o  female seen for OT evaluation [facility] w/ Chronic atrial fibrillation   Comorbidities affecting pt's functional performance at time of assessment include: Asthma, A-fib, Blurred vision, Cardiac disease, Colitis, COPD, Diverticulosis, Emphysema  Personal factors affecting pt at time of IE include:steps to enter environment, limited home support, difficulty performing ADLS, difficulty performing IADLS  and limited insight into deficits  Prior to admission, pt was Mod I with aDLs  Upon evaluation: the following deficits impact occupational performance: decreased balance, decreased tolerance, impaired memory, impaired problem solving, decreased safety awareness and increased pain  Pt to benefit from continued skilled OT tx while in the hospital to address deficits as defined above and maximize level of functional independence w ADL's and functional mobility  Occupational Performance areas to address include: bathing/shower, toilet hygiene, dressing, functional mobility and clothing management  From OT standpoint, recommendation at time of d/c would be STR  Goals   Patient Goals To have less pain   Plan   Treatment Interventions ADL retraining;Functional transfer training; Endurance training;Cognitive reorientation;Equipment evaluation/education; Neuromuscular reeducation; Energy conservation   Goal Expiration Date 10/22/19   OT Treatment Day 0   OT Frequency 3-5x/wk   Recommendation   OT Discharge Recommendation Short Term Rehab   OT - OK to Discharge Yes  (when medically stable)   Barthel Index   Feeding 10   Bathing 0   Grooming Score 5   Dressing Score 5   Bladder Score 5   Bowels Score 5   Toilet Use Score 5   Transfers (Bed/Chair) Score 5   Mobility (Level Surface) Score 0   Stairs Score 0   Barthel Index Score 40     GOALS:    Pt will achieve the following within specified time frame: STG  Pt will achieve the following goals within 5 days    *ADL transfers with CGA for inc'd independence with ADLs/purposeful tasks    *UB ADL with (S) for inc'd independence with self cares    *LB ADL with Min (A) using AE prn for inc'd independence with self cares    *Toileting with Min (A) for clothing management and hygiene for return to PLOF with personal care    *Increase static stand balance to F+ and dyn stand balance to F- for inc'd safety with standing purposeful tasks    *Increase stand tolerance x5 m for inc'd tolerance with standing purposeful tasks    *Participate in 10m UE therex to increase overall stamina/activity tolerance for purposeful tasks    *Bed mobility- (S) for inc'd independence to manage own comfort and initiate EOB & OOB purposeful tasks    Pt will achieve the following within specified time frame: LTG  Pt will achieve the following goals within 10 days    *ADL transfers with (S) for inc'd independence with ADLs/purposeful tasks    *UB ADL with (I) for inc'd independence with self cares    *LB ADL with CGA using AE prn for inc'd independence with self cares    *Toileting with CGA for clothing management and hygiene for return to PLOF with personal care    *Increase static stand balance to G- and dyn stand balance to F for inc'd safety with standing purposeful tasks    *Increase stand tolerance x7 m for inc'd tolerance with standing purposeful tasks    *Bed mobility- (I) for inc'd independence to manage own comfort and initiate EOB & OOB purposeful tasks      Davin Coker MS, OTR/L yes

## 2019-10-12 NOTE — H&P ADULT - HISTORY OF PRESENT ILLNESS
89 year old female patient, bedbound, non verbal AAO x0 at baseline from [facility], with PMH significant for HTN, DM, HLD and dementia is sent from NH due to fever of 102F, congestion and oxygen saturation of 81%. On arrival, patient was noted to have rectal temperature of 105F, , O2 sat of 88%. She was placed on non-rebreather which improved SPO2 to 95%. In ED, it was noted that G tube was not draining and was erythematous no purulent discharge was noted. G-tube was replaced by ED provider. Her UA was negative, chest Xray showed: new left sided infiltrate at base.  As per charts, patient was being treated with zosyn for osteomyelitis of left foot for last 2 days. However, she continued to spike fever.     History was limited, patient's family was at bedside. Had detailed discussion about GOC, she is DNR/DNI, no pressor support or central line.

## 2019-10-12 NOTE — ED ADULT NURSE REASSESSMENT NOTE - NS ED NURSE REASSESS COMMENT FT1
1256 - xray completed . 1256 - xray completed . BP of 85/66 . bolus of 1 liter in progress.  MD brumfield aware.

## 2019-10-12 NOTE — H&P ADULT - PROBLEM SELECTOR PLAN 10
IMPROVE VTE score:  [ ] Previous VTE                                                3  [ ] Thrombophilia                                             2  [ ] Lower limb paralysis                                  2        (unable to hold up >15 seconds)    [ ] Current Cancer (within 6 months)            2   []x Immobilization > 24 hrs                              1  [ ] ICU/CCU stay > 24 hours                            1  [x] Age > 60                                                         1  heparin sc

## 2019-10-13 LAB
ALBUMIN SERPL ELPH-MCNC: 2 G/DL — LOW (ref 3.5–5)
ALP SERPL-CCNC: 49 U/L — SIGNIFICANT CHANGE UP (ref 40–120)
ALT FLD-CCNC: 40 U/L DA — SIGNIFICANT CHANGE UP (ref 10–60)
ANION GAP SERPL CALC-SCNC: 7 MMOL/L — SIGNIFICANT CHANGE UP (ref 5–17)
ANION GAP SERPL CALC-SCNC: 8 MMOL/L — SIGNIFICANT CHANGE UP (ref 5–17)
AST SERPL-CCNC: 41 U/L — HIGH (ref 10–40)
BASOPHILS # BLD AUTO: SIGNIFICANT CHANGE UP K/UL (ref 0–0.2)
BASOPHILS NFR BLD AUTO: SIGNIFICANT CHANGE UP % (ref 0–2)
BILIRUB SERPL-MCNC: 0.3 MG/DL — SIGNIFICANT CHANGE UP (ref 0.2–1.2)
BUN SERPL-MCNC: 105 MG/DL — HIGH (ref 7–18)
BUN SERPL-MCNC: 111 MG/DL — HIGH (ref 7–18)
CALCIUM SERPL-MCNC: 6.7 MG/DL — LOW (ref 8.4–10.5)
CALCIUM SERPL-MCNC: 6.9 MG/DL — LOW (ref 8.4–10.5)
CHLORIDE SERPL-SCNC: 123 MMOL/L — HIGH (ref 96–108)
CHLORIDE SERPL-SCNC: 126 MMOL/L — HIGH (ref 96–108)
CHOLEST SERPL-MCNC: 90 MG/DL — SIGNIFICANT CHANGE UP (ref 10–199)
CO2 SERPL-SCNC: 17 MMOL/L — LOW (ref 22–31)
CO2 SERPL-SCNC: 25 MMOL/L — SIGNIFICANT CHANGE UP (ref 22–31)
CREAT SERPL-MCNC: 1.64 MG/DL — HIGH (ref 0.5–1.3)
CREAT SERPL-MCNC: 1.78 MG/DL — HIGH (ref 0.5–1.3)
CULTURE RESULTS: NO GROWTH — SIGNIFICANT CHANGE UP
EOSINOPHIL # BLD AUTO: SIGNIFICANT CHANGE UP K/UL (ref 0–0.5)
EOSINOPHIL NFR BLD AUTO: SIGNIFICANT CHANGE UP % (ref 0–6)
ERYTHROCYTE [SEDIMENTATION RATE] IN BLOOD: 64 MM/HR — HIGH (ref 0–20)
GLUCOSE SERPL-MCNC: 164 MG/DL — HIGH (ref 70–99)
GLUCOSE SERPL-MCNC: 265 MG/DL — HIGH (ref 70–99)
HBA1C BLD-MCNC: 6.9 % — HIGH (ref 4–5.6)
HCT VFR BLD CALC: 33.9 % — LOW (ref 34.5–45)
HDLC SERPL-MCNC: 18 MG/DL — LOW
HGB BLD-MCNC: 9.6 G/DL — LOW (ref 11.5–15.5)
IMM GRANULOCYTES NFR BLD AUTO: SIGNIFICANT CHANGE UP % (ref 0–1.5)
INR BLD: 1.2 RATIO — HIGH (ref 0.88–1.16)
LACTATE SERPL-SCNC: 2.5 MMOL/L — HIGH (ref 0.7–2)
LIPID PNL WITH DIRECT LDL SERPL: 42 MG/DL — SIGNIFICANT CHANGE UP
LYMPHOCYTES # BLD AUTO: SIGNIFICANT CHANGE UP % (ref 13–44)
LYMPHOCYTES # BLD AUTO: SIGNIFICANT CHANGE UP K/UL (ref 1–3.3)
MAGNESIUM SERPL-MCNC: 3.6 MG/DL — HIGH (ref 1.6–2.6)
MCHC RBC-ENTMCNC: 28.3 GM/DL — LOW (ref 32–36)
MCHC RBC-ENTMCNC: 28.3 PG — SIGNIFICANT CHANGE UP (ref 27–34)
MCV RBC AUTO: 100 FL — SIGNIFICANT CHANGE UP (ref 80–100)
MONOCYTES # BLD AUTO: SIGNIFICANT CHANGE UP K/UL (ref 0–0.9)
MONOCYTES NFR BLD AUTO: SIGNIFICANT CHANGE UP % (ref 2–14)
NEUTROPHILS # BLD AUTO: SIGNIFICANT CHANGE UP K/UL (ref 1.8–7.4)
NEUTROPHILS NFR BLD AUTO: SIGNIFICANT CHANGE UP % (ref 43–77)
NRBC # BLD: 0 /100 WBCS — SIGNIFICANT CHANGE UP (ref 0–0)
PHOSPHATE SERPL-MCNC: 4.6 MG/DL — HIGH (ref 2.5–4.5)
PLATELET # BLD AUTO: 42 K/UL — LOW (ref 150–400)
POTASSIUM SERPL-MCNC: 4 MMOL/L — SIGNIFICANT CHANGE UP (ref 3.5–5.3)
POTASSIUM SERPL-MCNC: 4.4 MMOL/L — SIGNIFICANT CHANGE UP (ref 3.5–5.3)
POTASSIUM SERPL-SCNC: 4 MMOL/L — SIGNIFICANT CHANGE UP (ref 3.5–5.3)
POTASSIUM SERPL-SCNC: 4.4 MMOL/L — SIGNIFICANT CHANGE UP (ref 3.5–5.3)
PROT SERPL-MCNC: 5.8 G/DL — LOW (ref 6–8.3)
PROTHROM AB SERPL-ACNC: 13.4 SEC — HIGH (ref 10–12.9)
RBC # BLD: 3.39 M/UL — LOW (ref 3.8–5.2)
RBC # FLD: 17.8 % — HIGH (ref 10.3–14.5)
SODIUM SERPL-SCNC: 151 MMOL/L — HIGH (ref 135–145)
SODIUM SERPL-SCNC: 155 MMOL/L — HIGH (ref 135–145)
SPECIMEN SOURCE: SIGNIFICANT CHANGE UP
TOTAL CHOLESTEROL/HDL RATIO MEASUREMENT: 5 RATIO — SIGNIFICANT CHANGE UP (ref 3.3–7.1)
TRIGL SERPL-MCNC: 149 MG/DL — SIGNIFICANT CHANGE UP (ref 10–149)
WBC # BLD: 12.1 K/UL — HIGH (ref 3.8–10.5)
WBC # FLD AUTO: 12.1 K/UL — HIGH (ref 3.8–10.5)

## 2019-10-13 RX ORDER — SODIUM CHLORIDE 9 MG/ML
1000 INJECTION INTRAMUSCULAR; INTRAVENOUS; SUBCUTANEOUS
Refills: 0 | Status: DISCONTINUED | OUTPATIENT
Start: 2019-10-13 | End: 2019-10-13

## 2019-10-13 RX ADMIN — Medication 50 MILLILITER(S): at 00:49

## 2019-10-13 RX ADMIN — Medication 50 MILLILITER(S): at 06:53

## 2019-10-13 RX ADMIN — MEROPENEM 100 MILLIGRAM(S): 1 INJECTION INTRAVENOUS at 17:38

## 2019-10-13 RX ADMIN — SODIUM CHLORIDE 75 MILLILITER(S): 9 INJECTION INTRAMUSCULAR; INTRAVENOUS; SUBCUTANEOUS at 17:39

## 2019-10-13 RX ADMIN — HEPARIN SODIUM 5000 UNIT(S): 5000 INJECTION INTRAVENOUS; SUBCUTANEOUS at 06:06

## 2019-10-13 RX ADMIN — Medication 50 MILLILITER(S): at 11:29

## 2019-10-13 RX ADMIN — MEROPENEM 100 MILLIGRAM(S): 1 INJECTION INTRAVENOUS at 06:06

## 2019-10-13 NOTE — PROGRESS NOTE ADULT - SUBJECTIVE AND OBJECTIVE BOX
Patient is a 89y old  Female who presents with a chief complaint of respiratory distress, fever (12 Oct 2019 16:04)      HPI:  89 year old female patient, bedbound, non verbal AAO x0 at baseline from [facility], with PMH significant for HTN, DM, HLD and dementia is sent from NH due to fever of 102F, congestion and oxygen saturation of 81%. On arrival, patient was noted to have rectal temperature of 105F, , O2 sat of 88%. She was placed on non-rebreather which improved SPO2 to 95%. In ED, it was noted that G tube was not draining and was erythematous no purulent discharge was noted. G-tube was replaced by ED provider. Her UA was negative, chest Xray showed: new left sided infiltrate at base.  As per charts, patient was being treated with zosyn for osteomyelitis of left foot for last 2 days. However, she continued to spike fever.     History was limited, patient's family was at bedside. Had detailed discussion about GOC, she is DNR/DNI, no pressor support or central line. (12 Oct 2019 12:37)    Seen bedside this AM by podiatry for left heel ulcer.  Patient was not alert, aware, nor oriented.      PMH:Dementia  HTN (hypertension)  Hyperlipidemia    Allergies: No Known Allergies    Medications: albumin human 25% IVPB 50 milliLiter(s) IV Intermittent every 6 hours  dextrose 5%. 1000 milliLiter(s) IV Continuous <Continuous>  heparin  Injectable 5000 Unit(s) SubCutaneous every 12 hours  metroNIDAZOLE  IVPB 500 milliGRAM(s) IV Intermittent every 8 hours    FH:  PSX: No significant past surgical history    SH: albumin human 25% IVPB 50 milliLiter(s) IV Intermittent every 6 hours  dextrose 5%. 1000 milliLiter(s) IV Continuous <Continuous>  heparin  Injectable 5000 Unit(s) SubCutaneous every 12 hours  metroNIDAZOLE  IVPB 500 milliGRAM(s) IV Intermittent every 8 hours      Vital Signs Last 24 Hrs  T(C): 37.2 (12 Oct 2019 16:52), Max: 40.7 (12 Oct 2019 09:48)  T(F): 98.9 (12 Oct 2019 16:52), Max: 105.2 (12 Oct 2019 09:48)  HR: 78 (12 Oct 2019 16:52) (77 - 138)  BP: 95/47 (12 Oct 2019 16:52) (85/66 - 113/55)  BP(mean): --  RR: 20 (12 Oct 2019 16:52) (20 - 36)  SpO2: 95% (12 Oct 2019 16:52) (88% - 98%)    LABS                        12.2   12.42 )-----------( 86       ( 12 Oct 2019 10:03 )             43.0               10-12    160<HH>  |  132<H>  |  102<H>  ----------------------------<  348<H>  4.6   |  18<L>  |  1.79<H>    Ca    7.1<L< from: Xray Foot AP + Lateral, Left (10.12.19 @ 13:08) >    EXAM:  FOOT 2VIEWS LT                            PROCEDURE DATE:  10/12/2019          INTERPRETATION:  Radiographs of the left foot         CLINICAL INFORMATION:  Concern for osteomyelitis sepsis    TECHNIQUE: Vertebral AP and lateral views of the foot were obtained.    FINDINGS:   No prior similar studies are available for review.    The forefoot demonstrates no evidence of fracture. Normal alignment is   seen. Joint spaces are preserved. Sesamoids are intact.    The midfoot appears intact.    The hindfoot demonstrates no plantar calcaneal spur.    There is soft tissue ulceration seen at the level of the heel. There is   focal sclerosis of the posterior calcaneus and heterogeneous density of   the bony structures diffusely.    IMPRESSION: Diffuse osteopenia and heterogeneity in the density of the   bony structures. There is more focal increased density at the level of   the calcaneus where the soft tissue ulcer is seen. Vasculitis cannot be   excluded and recommend correlation with MR imaging of the foot       EUGENE EMERSON M.D.,ATTENDING RADIOLOGIST  This document has been electronically signed. Oct 12 2019  2:35PM    < end of copied text >    >      12 Oct 2019 14:45    TPro  6.7  /  Alb  1.8<L>  /  TBili  0.4  /  DBili  x   /  AST  55<H>  /  ALT  51  /  AlkPhos  65  10-12      PHYSICAL EXAM  GEN: MILTON GUIDRY is a pleasant well-nourished, well developed 89y Female in no acute distress, alert awake, and oriented to person, place and time.   LE Focused:    Vasc:  DP and PT pulses non-palpable b/l, +2 pitting edema noted b/l.  CFT delayed 5 seconds to digits.  Derm: Left heel ulcer measuring 5.0cm x 4.6cm x 0.5cm with red granular base and small area of fibrotic tissue. Probes to bone.  No malodor, no purulence, no increase in warmth, minimal periwound erythema.    Neuro: unable to assess  MSK: Patient is in an overall contracted position        Cultures:  taken 10/12: pending    A: Left heel ulcer down to the level of bone with likely underlying osteomyelitis      P:  Patient evaluated, chart reviewed  Xrays reviewed- as above  Wound flushed with copious amounts of sterile saline  Culture taken of left heel ulcer 10/12- pending  Wound dressed with 4x4 gauzeCarmen  Recommend dressing changes with santyl, 4x4 gauzeCarmen  Recommend continued Heel offloading in bed  Recommend IV antibiotics per primary team or ID  COLLIN/PVR ordered  Consider MRI later next week to further evaluate extent of affected bones pending patient's overall health  Podiatry will follow while in-house  Will consider bedside bone biopsy pending patient's overall health, ID recommendations, and further discussion with the family  Discussed with attending Dr. Mcfadden

## 2019-10-13 NOTE — CONSULT NOTE ADULT - PROBLEM SELECTOR RECOMMENDATION 2
Hypernatremia in setting o fever and malfunctioning PEG.  PEG now changed and working.  Serum Na better, but still 3L water deficit noted.  Can increase free water through PEG 300ml q6 hours.   Start IVF resuscitation.  No need to correct serum na too rapidly.

## 2019-10-13 NOTE — CHART NOTE - NSCHARTNOTEFT_GEN_A_CORE
Patient's sodium this afternoon noted ti be 151, indicating a rapid fall in level. Will hold iv hydration and decrease free water to 250cc q 8 hours as discussed with nephro on case Dr Anaya.

## 2019-10-13 NOTE — CONSULT NOTE ADULT - SUBJECTIVE AND OBJECTIVE BOX
[facility] Consult Note Nephrology - CONSULTATION NOTE - [phone] - Dr Anaya / Dr Melgoza / Dr Stafford / Dr Barron / Dr Luis / Dr Shultz / Dr Garcia / Dr Madrigal    Patient is a 89y Female whom presented to the hospital with     PAST MEDICAL & SURGICAL HISTORY:  Dementia  HTN (hypertension)  Hyperlipidemia  No significant past surgical history    Allergies:  No Known Allergies    Home Medications Reviewed  Hospital Medications:   MEDICATIONS  (STANDING):  meropenem  IVPB 500 milliGRAM(s) IV Intermittent every 12 hours    SOCIAL HISTORY:  Denies ETOh,Smoking,   FAMILY HISTORY:    REVIEW OF SYSTEMS:  CONSTITUTIONAL: No weakness, fevers or chills  EYES/ENT: No visual changes;  No vertigo or throat pain   NECK: No pain or stiffness  RESPIRATORY: No cough, wheezing, hemoptysis; No shortness of breath  CARDIOVASCULAR: No chest pain or palpitations.  GASTROINTESTINAL: No abdominal or epigastric pain. No nausea, vomiting, or hematemesis; No diarrhea or constipation. No melena or hematochezia.  GENITOURINARY: No dysuria, frequency, foamy urine, urinary urgency, incontinence or hematuria  NEUROLOGICAL: No numbness or weakness  SKIN: No itching, burning, rashes, or lesions   VASCULAR: No bilateral lower extremity edema.   All other review of systems is negative unless indicated above.    VITALS:  T(F): 97.9 (10-13-19 @ 07:43), Max: 98.9 (10-12-19 @ 16:52)  HR: 67 (10-13-19 @ 07:43)  BP: 120/51 (10-13-19 @ 07:43)  RR: 19 (10-13-19 @ 07:43)  SpO2: 100% (10-13-19 @ 07:43)  Wt(kg): --      PHYSICAL EXAM:  Constitutional: NAD  HEENT: anicteric sclera, oropharynx clear, MMM  Neck: No JVD  Respiratory: CTAB, no wheezes, rales or rhonchi  Cardiovascular: S1, S2, RRR  Gastrointestinal: BS+, soft, NT/ND  Extremities: No cyanosis or clubbing. No peripheral edema  Neurological: A/O x 3, no focal deficits  Psychiatric: Normal mood, normal affect  : No CVA tenderness. No jackson.   Skin: No rashes  Vascular Access:    LABS:  10-13    155<H>  |  123<H>  |  111<H>  ----------------------------<  265<H>  4.0   |  25  |  1.78<H>    Ca    6.7<L>      13 Oct 2019 06:00  Phos  4.6     10-13  Mg     3.6     10-13    TPro  5.8<L>  /  Alb  2.0<L>  /  TBili  0.3  /  DBili      /  AST  41<H>  /  ALT  40  /  AlkPhos  49  10-13    Creatinine Trend: 1.78 <--, 1.92 <--, 1.79 <--, 2.06 <--                        9.6    12.10 )-----------( 42       ( 13 Oct 2019 06:00 )             33.9     Urine Studies:  Urinalysis Basic - ( 12 Oct 2019 10:20 )    Color: Yellow / Appearance: Clear / S.010 / pH:   Gluc:  / Ketone: Negative  / Bili: Negative / Urobili: Negative   Blood:  / Protein: 30 mg/dL / Nitrite: Negative   Leuk Esterase: Small / RBC: 0-2 /HPF / WBC 6-10 /HPF   Sq Epi:  / Non Sq Epi:  / Bacteria: Trace /HPF      Sodium, Random Urine: 49 mmol/L (10-12 @ 20:26)  Creatinine, Random Urine: 70 mg/dL (10-12 @ 20:26)    RADIOLOGY & ADDITIONAL STUDIES:  < from: Xray Chest 1 View-PORTABLE IMMEDIATE (10.12.19 @ 11:02) >  FINDINGS/  IMPRESSION:  Nonspecific prominence right hilum unchanged. Increased interstitial lung   markings. Nonspecific new small densities left lung base. Follow-up   recommended.    Linear atelectasis right lung base. No pleural effusions     Heart size cannot be accurately assessed in this projection, but appear   enlarged.    < end of copied text > [facility] Consult Note Nephrology - CONSULTATION NOTE - [phone] - Dr Anaya / Dr Melgoza / Dr Stafford / Dr Barron / Dr Luis / Dr Shultz / Dr Garcia / Dr Madrigal    Patient is a 89y Female NH resident, bedbound and non verbal at baseline with PMH significant for HTN, DM, HLD and dementia is sent from NH due to fever of 102F, congestion and oxygen saturation of 81%. Pt unable to provide any hx. CLinical hx from chart. On arrival, patient was noted to have rectal temperature of 105F, , O2 sat of 88%. In ED, it was noted that G tube was not draining and was erythematous no purulent discharge was noted. G-tube was replaced by ED provider. Her UA was negative, chest Xray showed: new left sided infiltrate at base.  As per charts, patient was being treated with zosyn for osteomyelitis of left foot for last 2 days.   REnal consult for hypernatremia, admission na 160, with GAYLE, cr 2.0. Labs also significant for lactic acidosis.     PAST MEDICAL & SURGICAL HISTORY:  Dementia  HTN (hypertension)  Hyperlipidemia  No significant past surgical history    Allergies:  No Known Allergies    Home Medications Reviewed  Hospital Medications:   MEDICATIONS  (STANDING):  meropenem  IVPB 500 milliGRAM(s) IV Intermittent every 12 hours    SOCIAL HISTORY:  Denies ETOh,Smoking,   FAMILY HISTORY:    REVIEW OF SYSTEMS:  unable to obtain due to mental status.     VITALS:  T(F): 97.9 (10-13-19 @ 07:43), Max: 98.9 (10-12-19 @ 16:52)  HR: 67 (10-13-19 @ 07:43)  BP: 120/51 (10-13-19 @ 07:43)  RR: 19 (10-13-19 @ 07:43)  SpO2: 100% (10-13-19 @ 07:43)  Wt(kg): --      PHYSICAL EXAM:  Constitutional: NAD  HEENT: anicteric sclera, oropharynx clear, MMM  Neck: No JVD  Respiratory: CTAB, no wheezes, rales or rhonchi  Cardiovascular: S1, S2, RRR  Gastrointestinal: BS+, soft, NT/ND, G tube   Extremities: No cyanosis or clubbing. No peripheral edema  Neurological: A/O x 0, no focal deficits  Psychiatric: Normal mood, normal affect  : No CVA tenderness. +jackson.   Skin: No rashes    LABS:  10-13    155<H>  |  123<H>  |  111<H>  ----------------------------<  265<H>  4.0   |  25  |  1.78<H>    Ca    6.7<L>      13 Oct 2019 06:00  Phos  4.6     10-13  Mg     3.6     10-13    TPro  5.8<L>  /  Alb  2.0<L>  /  TBili  0.3  /  DBili      /  AST  41<H>  /  ALT  40  /  AlkPhos  49  10-13    Creatinine Trend: 1.78 <--, 1.92 <--, 1.79 <--, 2.06 <--                        9.6    12.10 )-----------( 42       ( 13 Oct 2019 06:00 )             33.9     Urine Studies:  Urinalysis Basic - ( 12 Oct 2019 10:20 )    Color: Yellow / Appearance: Clear / S.010 / pH:   Gluc:  / Ketone: Negative  / Bili: Negative / Urobili: Negative   Blood:  / Protein: 30 mg/dL / Nitrite: Negative   Leuk Esterase: Small / RBC: 0-2 /HPF / WBC 6-10 /HPF   Sq Epi:  / Non Sq Epi:  / Bacteria: Trace /HPF      Sodium, Random Urine: 49 mmol/L (10-12 @ 20:26)  Creatinine, Random Urine: 70 mg/dL (10-12 @ 20:26)    RADIOLOGY & ADDITIONAL STUDIES:  < from: Xray Chest 1 View-PORTABLE IMMEDIATE (10.12.19 @ 11:02) >  FINDINGS/  IMPRESSION:  Nonspecific prominence right hilum unchanged. Increased interstitial lung   markings. Nonspecific new small densities left lung base. Follow-up   recommended.    Linear atelectasis right lung base. No pleural effusions     Heart size cannot be accurately assessed in this projection, but appear   enlarged.    < end of copied text >

## 2019-10-13 NOTE — CONSULT NOTE ADULT - GASTROINTESTINAL DETAILS
soft/nontender/no distention/no masses palpable/no guarding/bowel sounds normal/no organomegaly/no rebound tenderness/no rigidity

## 2019-10-13 NOTE — CONSULT NOTE ADULT - SUBJECTIVE AND OBJECTIVE BOX
HPI:  89 year old female patient, bedbound, non verbal AAO x0 at baseline from [facility], with PMH significant for HTN, DM, HLD and dementia is sent from NH due to fever of 102F, congestion and oxygen saturation of 81%. On arrival, patient was noted to have rectal temperature of 105F, , O2 sat of 88%. She was placed on non-rebreather which improved SPO2 to 95%. In ED, it was noted that G tube was not draining and was erythematous no purulent discharge was noted. G-tube was replaced by ED provider. Her UA was negative, chest Xray showed: new left sided infiltrate at base.  As per charts, patient was being treated with zosyn for osteomyelitis of left foot for last 2 days. However, she continued to spike fever.     History was limited, patient's family was at bedside. Had detailed discussion about GOC, she is DNR/DNI, no pressor support or central line. (12 Oct 2019 12:37)      PAST MEDICAL & SURGICAL HISTORY:  Dementia  HTN (hypertension)  Hyperlipidemia  No significant past surgical history      No Known Allergies      Meds:  meropenem  IVPB 500 milliGRAM(s) IV Intermittent every 12 hours  sodium chloride 0.9%. 1000 milliLiter(s) IV Continuous <Continuous>      SOCIAL HISTORY:  Smoker:  YES / NO        PACK YEARS:                         WHEN QUIT?  ETOH use:  YES / NO               FREQUENCY / QUANTITY:  Ilicit Drug use:  YES / NO  Occupation:  Assisted device use (Cane / Walker):  Live with:    FAMILY HISTORY:      VITALS:  Vital Signs Last 24 Hrs  T(C): 36.6 (13 Oct 2019 07:43), Max: 37.2 (12 Oct 2019 16:52)  T(F): 97.9 (13 Oct 2019 07:43), Max: 98.9 (12 Oct 2019 16:52)  HR: 67 (13 Oct 2019 07:43) (67 - 78)  BP: 120/51 (13 Oct 2019 07:43) (95/47 - 120/51)  BP(mean): --  RR: 19 (13 Oct 2019 07:43) (19 - 20)  SpO2: 100% (13 Oct 2019 07:43) (95% - 100%)    LABS/DIAGNOSTIC TESTS:                          9.6    12.10 )-----------( 42       ( 13 Oct 2019 06:00 )             33.9     WBC Count: 12.10 K/uL (10-13 @ 06:00)  WBC Count: 12.42 K/uL (10-12 @ 10:03)      10-13    151<H>  |  126<H>  |  105<H>  ----------------------------<  164<H>  4.4   |  17<L>  |  1.64<H>    Ca    6.9<L>      13 Oct 2019 14:35  Phos  4.6     10-13  Mg     3.6     10-13    TPro  5.8<L>  /  Alb  2.0<L>  /  TBili  0.3  /  DBili  x   /  AST  41<H>  /  ALT  40  /  AlkPhos  49  10-13      Urinalysis Basic - ( 12 Oct 2019 10:20 )    Color: Yellow / Appearance: Clear / S.010 / pH: x  Gluc: x / Ketone: Negative  / Bili: Negative / Urobili: Negative   Blood: x / Protein: 30 mg/dL / Nitrite: Negative   Leuk Esterase: Small / RBC: 0-2 /HPF / WBC 6-10 /HPF   Sq Epi: x / Non Sq Epi: x / Bacteria: Trace /HPF        LIVER FUNCTIONS - ( 13 Oct 2019 06:00 )  Alb: 2.0 g/dL / Pro: 5.8 g/dL / ALK PHOS: 49 U/L / ALT: 40 U/L DA / AST: 41 U/L / GGT: x             PT/INR - ( 13 Oct 2019 06:00 )   PT: 13.4 sec;   INR: 1.20 ratio         PTT - ( 12 Oct 2019 10:03 )  PTT:29.9 sec    LACTATE:Lactate, Blood: 2.5 mmol/L (10-13 @ 06:00)  Lactate, Blood: 5.3 mmol/L (10-12 @ 20:21)      ABG -     CULTURES:   .Urine  10-12 @ 16:31   No growth  --  --          RADIOLOGY:< from: Xray Chest 1 View-PORTABLE IMMEDIATE (10.12.19 @ 11:02) >  EXAM:  XR CHEST PORTABLE IMMED 1V                            PROCEDURE DATE:  10/12/2019          INTERPRETATION:  CLINICAL STATEMENT: Chest pain.    TECHNIQUE: AP view of the chest.    COMPARISON: 2019    FINDINGS/  IMPRESSION:  Nonspecific prominence right hilum unchanged. Increased interstitial lung   markings. Nonspecific new small densities left lung base. Follow-up   recommended.    Linear atelectasis right lung base. No pleural effusions     Heart size cannot be accurately assessed in this projection, but appear   enlarged.    ------------------------------------------------------------------------------------------------------------------------------------  < from: Xray Foot AP + Lateral, Left (10.12.19 @ 13:08) >  EXAM:  FOOT 2VIEWS                             PROCEDURE DATE:  10/12/2019          INTERPRETATION:  Radiographs of the left foot         CLINICAL INFORMATION:  Concern for osteomyelitis sepsis    TECHNIQUE: Vertebral AP and lateral views of the foot were obtained.    FINDINGS:   No prior similar studies are available for review.    The forefoot demonstrates no evidence of fracture. Normal alignment is   seen. Joint spaces are preserved. Sesamoids are intact.    The midfoot appears intact.    The hindfoot demonstrates no plantar calcaneal spur.    There is soft tissue ulceration seen at the level of the heel. There is   focal sclerosis of the posterior calcaneus and heterogeneous density of   the bony structures diffusely.    IMPRESSION: Diffuse osteopenia and heterogeneity in the density of the   bony structures. There is more focal increased density at the level of   the calcaneus where the soft tissue ulcer is seen. Vasculitis cannot be   excluded and recommend correlation with MR imaging of the foot           < end of copied text >        ROS  [  ] UNABLE TO ELICIT HPI:  89 year old female patient, bedbound, non verbal AAO x0 at baseline from [facility], with PMH significant for HTN, DM, HLD and dementia is sent from NH due to fever of 102F, congestion and oxygen saturation of 81%. On arrival, patient was noted to have rectal temperature of 105F, , O2 sat of 88%. She was placed on non-rebreather which improved SPO2 to 95%. In ED, it was noted that G tube was not draining and was erythematous no purulent discharge was noted. G-tube was replaced by ED provider. Her UA was negative, chest Xray showed: new left sided infiltrate at base.  As per charts, patient was being treated with zosyn for osteomyelitis of left foot for last 2 days. However, she continued to spike fever.   History was limited, patient's family was at bedside. Had detailed discussion about GOC, she is DNR/DNI, no pressor support or central line. (12 Oct 2019 12:37)    History obtained from the medical record and pts family who is at the bedside. She has been admitted from the NH with fevers and SOB, she has been coughing a little and was found to have pneumonia here. She also has a left foot osteo for which she was started on zosyn at the NH 2 days ago. Here she has a left foot ulcer that probes to bone as per podiatry. She had a high fever at the NH and here. The pt is totally confused and lethargic and so can not give history.      PAST MEDICAL & SURGICAL HISTORY:  Dementia  HTN (hypertension)  Hyperlipidemia  No significant past surgical history      No Known Allergies      Meds:  meropenem  IVPB 500 milliGRAM(s) IV Intermittent every 12 hours  sodium chloride 0.9%. 1000 milliLiter(s) IV Continuous <Continuous>      SOCIAL HISTORY:  Smoker:  YES / NO        PACK YEARS:                         WHEN QUIT?  ETOH use:  YES / NO               FREQUENCY / QUANTITY:  Ilicit Drug use:  YES / NO  Occupation:  Assisted device use (Cane / Walker):  Live with:    FAMILY HISTORY:      VITALS:  Vital Signs Last 24 Hrs  T(C): 36.6 (13 Oct 2019 07:43), Max: 37.2 (12 Oct 2019 16:52)  T(F): 97.9 (13 Oct 2019 07:43), Max: 98.9 (12 Oct 2019 16:52)  HR: 67 (13 Oct 2019 07:43) (67 - 78)  BP: 120/51 (13 Oct 2019 07:43) (95/47 - 120/51)  BP(mean): --  RR: 19 (13 Oct 2019 07:43) (19 - 20)  SpO2: 100% (13 Oct 2019 07:43) (95% - 100%)    LABS/DIAGNOSTIC TESTS:                          9.6    12.10 )-----------( 42       ( 13 Oct 2019 06:00 )             33.9     WBC Count: 12.10 K/uL (10-13 @ 06:00)  WBC Count: 12.42 K/uL (10-12 @ 10:03)      10-13    151<H>  |  126<H>  |  105<H>  ----------------------------<  164<H>  4.4   |  17<L>  |  1.64<H>    Ca    6.9<L>      13 Oct 2019 14:35  Phos  4.6     10-13  Mg     3.6     10-13    TPro  5.8<L>  /  Alb  2.0<L>  /  TBili  0.3  /  DBili  x   /  AST  41<H>  /  ALT  40  /  AlkPhos  49  10-13      Urinalysis Basic - ( 12 Oct 2019 10:20 )    Color: Yellow / Appearance: Clear / S.010 / pH: x  Gluc: x / Ketone: Negative  / Bili: Negative / Urobili: Negative   Blood: x / Protein: 30 mg/dL / Nitrite: Negative   Leuk Esterase: Small / RBC: 0-2 /HPF / WBC 6-10 /HPF   Sq Epi: x / Non Sq Epi: x / Bacteria: Trace /HPF        LIVER FUNCTIONS - ( 13 Oct 2019 06:00 )  Alb: 2.0 g/dL / Pro: 5.8 g/dL / ALK PHOS: 49 U/L / ALT: 40 U/L DA / AST: 41 U/L / GGT: x             PT/INR - ( 13 Oct 2019 06:00 )   PT: 13.4 sec;   INR: 1.20 ratio         PTT - ( 12 Oct 2019 10:03 )  PTT:29.9 sec    LACTATE:Lactate, Blood: 2.5 mmol/L (10-13 @ 06:00)  Lactate, Blood: 5.3 mmol/L (10-12 @ 20:21)      ABG -     CULTURES:   .Urine  10-12 @ 16:31   No growth  --  --          RADIOLOGY:< from: Xray Chest 1 View-PORTABLE IMMEDIATE (10.12.19 @ 11:02) >  EXAM:  XR CHEST PORTABLE IMMED 1V                            PROCEDURE DATE:  10/12/2019          INTERPRETATION:  CLINICAL STATEMENT: Chest pain.    TECHNIQUE: AP view of the chest.    COMPARISON: 2019    FINDINGS/  IMPRESSION:  Nonspecific prominence right hilum unchanged. Increased interstitial lung   markings. Nonspecific new small densities left lung base. Follow-up   recommended.    Linear atelectasis right lung base. No pleural effusions     Heart size cannot be accurately assessed in this projection, but appear   enlarged.    ------------------------------------------------------------------------------------------------------------------------------------  < from: Xray Foot AP + Lateral, Left (10.12.19 @ 13:08) >  EXAM:  FOOT 2VIEWS LT                            PROCEDURE DATE:  10/12/2019          INTERPRETATION:  Radiographs of the left foot         CLINICAL INFORMATION:  Concern for osteomyelitis sepsis    TECHNIQUE: Vertebral AP and lateral views of the foot were obtained.    FINDINGS:   No prior similar studies are available for review.    The forefoot demonstrates no evidence of fracture. Normal alignment is   seen. Joint spaces are preserved. Sesamoids are intact.    The midfoot appears intact.    The hindfoot demonstrates no plantar calcaneal spur.    There is soft tissue ulceration seen at the level of the heel. There is   focal sclerosis of the posterior calcaneus and heterogeneous density of   the bony structures diffusely.    IMPRESSION: Diffuse osteopenia and heterogeneity in the density of the   bony structures. There is more focal increased density at the level of   the calcaneus where the soft tissue ulcer is seen. Vasculitis cannot be   excluded and recommend correlation with MR imaging of the foot           < end of copied text >        ROS  [  ] UNABLE TO ELICIT HPI:  89 year old female patient, bedbound, non verbal AAO x0 at baseline from [facility], with PMH significant for HTN, DM, HLD and dementia is sent from NH due to fever of 102F, congestion and oxygen saturation of 81%. On arrival, patient was noted to have rectal temperature of 105F, , O2 sat of 88%. She was placed on non-rebreather which improved SPO2 to 95%. In ED, it was noted that G tube was not draining and was erythematous no purulent discharge was noted. G-tube was replaced by ED provider. Her UA was negative, chest Xray showed: new left sided infiltrate at base.  As per charts, patient was being treated with zosyn for osteomyelitis of left foot for last 2 days. However, she continued to spike fever.   History was limited, patient's family was at bedside. Had detailed discussion about GOC, she is DNR/DNI, no pressor support or central line. (12 Oct 2019 12:37)    History obtained from the medical record and pts family who is at the bedside. She has been admitted from the NH with fevers and SOB, she has been coughing a little and was found to have pneumonia here. She also has a left foot osteo for which she was started on zosyn at the NH 2 days ago. Here she has a left foot ulcer that probes to bone as per podiatry. She had a high fever at the NH and here. The pt is totally confused and lethargic and so can not give history.       PAST MEDICAL & SURGICAL HISTORY:  Dementia  HTN (hypertension)  Hyperlipidemia  No significant past surgical history      No Known Allergies      Meds:  meropenem  IVPB 500 milliGRAM(s) IV Intermittent every 12 hours  sodium chloride 0.9%. 1000 milliLiter(s) IV Continuous <Continuous>      SOCIAL HISTORY:  Smoker:  no  ETOH use:  no    FAMILY HISTORY: not sig      VITALS:  Vital Signs Last 24 Hrs  T(C): 36.6 (13 Oct 2019 07:43), Max: 37.2 (12 Oct 2019 16:52)  T(F): 97.9 (13 Oct 2019 07:43), Max: 98.9 (12 Oct 2019 16:52)  HR: 67 (13 Oct 2019 07:43) (67 - 78)  BP: 120/51 (13 Oct 2019 07:43) (95/47 - 120/51)  BP(mean): --  RR: 19 (13 Oct 2019 07:43) (19 - 20)  SpO2: 100% (13 Oct 2019 07:43) (95% - 100%)    LABS/DIAGNOSTIC TESTS:                          9.6    12.10 )-----------( 42       ( 13 Oct 2019 06:00 )             33.9     WBC Count: 12.10 K/uL (10-13 @ 06:00)  WBC Count: 12.42 K/uL (10-12 @ 10:03)      10-13    151<H>  |  126<H>  |  105<H>  ----------------------------<  164<H>  4.4   |  17<L>  |  1.64<H>    Ca    6.9<L>      13 Oct 2019 14:35  Phos  4.6     10-13  Mg     3.6     10-13    TPro  5.8<L>  /  Alb  2.0<L>  /  TBili  0.3  /  DBili  x   /  AST  41<H>  /  ALT  40  /  AlkPhos  49  10-13      Urinalysis Basic - ( 12 Oct 2019 10:20 )    Color: Yellow / Appearance: Clear / S.010 / pH: x  Gluc: x / Ketone: Negative  / Bili: Negative / Urobili: Negative   Blood: x / Protein: 30 mg/dL / Nitrite: Negative   Leuk Esterase: Small / RBC: 0-2 /HPF / WBC 6-10 /HPF   Sq Epi: x / Non Sq Epi: x / Bacteria: Trace /HPF        LIVER FUNCTIONS - ( 13 Oct 2019 06:00 )  Alb: 2.0 g/dL / Pro: 5.8 g/dL / ALK PHOS: 49 U/L / ALT: 40 U/L DA / AST: 41 U/L / GGT: x             PT/INR - ( 13 Oct 2019 06:00 )   PT: 13.4 sec;   INR: 1.20 ratio         PTT - ( 12 Oct 2019 10:03 )  PTT:29.9 sec    LACTATE:Lactate, Blood: 2.5 mmol/L (10-13 @ 06:00)  Lactate, Blood: 5.3 mmol/L (10-12 @ 20:21)      ABG -     CULTURES:   .Urine  10-12 @ 16:31   No growth  --  --          RADIOLOGY:< from: Xray Chest 1 View-PORTABLE IMMEDIATE (10.12.19 @ 11:02) >  EXAM:  XR CHEST PORTABLE IMMED 1V                            PROCEDURE DATE:  10/12/2019          INTERPRETATION:  CLINICAL STATEMENT: Chest pain.    TECHNIQUE: AP view of the chest.    COMPARISON: 2019    FINDINGS/  IMPRESSION:  Nonspecific prominence right hilum unchanged. Increased interstitial lung   markings. Nonspecific new small densities left lung base. Follow-up   recommended.    Linear atelectasis right lung base. No pleural effusions     Heart size cannot be accurately assessed in this projection, but appear   enlarged.    ------------------------------------------------------------------------------------------------------------------------------------  < from: Xray Foot AP + Lateral, Left (10.12.19 @ 13:08) >  EXAM:  FOOT 2VIEWS LT                            PROCEDURE DATE:  10/12/2019          INTERPRETATION:  Radiographs of the left foot         CLINICAL INFORMATION:  Concern for osteomyelitis sepsis    TECHNIQUE: Vertebral AP and lateral views of the foot were obtained.    FINDINGS:   No prior similar studies are available for review.    The forefoot demonstrates no evidence of fracture. Normal alignment is   seen. Joint spaces are preserved. Sesamoids are intact.    The midfoot appears intact.    The hindfoot demonstrates no plantar calcaneal spur.    There is soft tissue ulceration seen at the level of the heel. There is   focal sclerosis of the posterior calcaneus and heterogeneous density of   the bony structures diffusely.    IMPRESSION: Diffuse osteopenia and heterogeneity in the density of the   bony structures. There is more focal increased density at the level of   the calcaneus where the soft tissue ulcer is seen. Vasculitis cannot be   excluded and recommend correlation with MR imaging of the foot           < end of copied text >        ROS  [x  ] UNABLE TO ELICIT

## 2019-10-14 DIAGNOSIS — Z71.89 OTHER SPECIFIED COUNSELING: ICD-10-CM

## 2019-10-14 DIAGNOSIS — R06.02 SHORTNESS OF BREATH: ICD-10-CM

## 2019-10-14 DIAGNOSIS — J18.9 PNEUMONIA, UNSPECIFIED ORGANISM: ICD-10-CM

## 2019-10-14 DIAGNOSIS — R53.2 FUNCTIONAL QUADRIPLEGIA: ICD-10-CM

## 2019-10-14 DIAGNOSIS — Z51.5 ENCOUNTER FOR PALLIATIVE CARE: ICD-10-CM

## 2019-10-14 DIAGNOSIS — E46 UNSPECIFIED PROTEIN-CALORIE MALNUTRITION: ICD-10-CM

## 2019-10-14 LAB
-  AMIKACIN: SIGNIFICANT CHANGE UP
-  AMOXICILLIN/CLAVULANIC ACID: SIGNIFICANT CHANGE UP
-  AMPICILLIN/SULBACTAM: SIGNIFICANT CHANGE UP
-  AMPICILLIN: SIGNIFICANT CHANGE UP
-  AZTREONAM: SIGNIFICANT CHANGE UP
-  CEFAZOLIN: SIGNIFICANT CHANGE UP
-  CEFEPIME: SIGNIFICANT CHANGE UP
-  CEFOXITIN: SIGNIFICANT CHANGE UP
-  CEFTRIAXONE: SIGNIFICANT CHANGE UP
-  CIPROFLOXACIN: SIGNIFICANT CHANGE UP
-  ERTAPENEM: SIGNIFICANT CHANGE UP
-  GENTAMICIN: SIGNIFICANT CHANGE UP
-  LEVOFLOXACIN: SIGNIFICANT CHANGE UP
-  MEROPENEM: SIGNIFICANT CHANGE UP
-  PIPERACILLIN/TAZOBACTAM: SIGNIFICANT CHANGE UP
-  TOBRAMYCIN: SIGNIFICANT CHANGE UP
-  TRIMETHOPRIM/SULFAMETHOXAZOLE: SIGNIFICANT CHANGE UP
ALBUMIN SERPL ELPH-MCNC: 2.1 G/DL — LOW (ref 3.5–5)
ALP SERPL-CCNC: 53 U/L — SIGNIFICANT CHANGE UP (ref 40–120)
ALT FLD-CCNC: 41 U/L DA — SIGNIFICANT CHANGE UP (ref 10–60)
ANION GAP SERPL CALC-SCNC: 5 MMOL/L — SIGNIFICANT CHANGE UP (ref 5–17)
ANION GAP SERPL CALC-SCNC: 6 MMOL/L — SIGNIFICANT CHANGE UP (ref 5–17)
ANISOCYTOSIS BLD QL: SLIGHT — SIGNIFICANT CHANGE UP
AST SERPL-CCNC: 62 U/L — HIGH (ref 10–40)
BASO STIPL BLD QL SMEAR: PRESENT — SIGNIFICANT CHANGE UP
BASOPHILS # BLD AUTO: 0 K/UL — SIGNIFICANT CHANGE UP (ref 0–0.2)
BASOPHILS NFR BLD AUTO: 0 % — SIGNIFICANT CHANGE UP (ref 0–2)
BILIRUB SERPL-MCNC: 0.5 MG/DL — SIGNIFICANT CHANGE UP (ref 0.2–1.2)
BUN SERPL-MCNC: 105 MG/DL — HIGH (ref 7–18)
BUN SERPL-MCNC: 97 MG/DL — HIGH (ref 7–18)
CALCIUM SERPL-MCNC: 7 MG/DL — LOW (ref 8.4–10.5)
CALCIUM SERPL-MCNC: 7.2 MG/DL — LOW (ref 8.4–10.5)
CHLORIDE SERPL-SCNC: 123 MMOL/L — HIGH (ref 96–108)
CHLORIDE SERPL-SCNC: 124 MMOL/L — HIGH (ref 96–108)
CO2 SERPL-SCNC: 24 MMOL/L — SIGNIFICANT CHANGE UP (ref 22–31)
CO2 SERPL-SCNC: 25 MMOL/L — SIGNIFICANT CHANGE UP (ref 22–31)
CREAT SERPL-MCNC: 1.46 MG/DL — HIGH (ref 0.5–1.3)
CREAT SERPL-MCNC: 1.48 MG/DL — HIGH (ref 0.5–1.3)
EOSINOPHIL # BLD AUTO: 0 K/UL — SIGNIFICANT CHANGE UP (ref 0–0.5)
EOSINOPHIL NFR BLD AUTO: 0 % — SIGNIFICANT CHANGE UP (ref 0–6)
GIANT PLATELETS BLD QL SMEAR: PRESENT — SIGNIFICANT CHANGE UP
GLUCOSE SERPL-MCNC: 110 MG/DL — HIGH (ref 70–99)
GLUCOSE SERPL-MCNC: 131 MG/DL — HIGH (ref 70–99)
HCT VFR BLD CALC: 33.7 % — LOW (ref 34.5–45)
HGB BLD-MCNC: 9.6 G/DL — LOW (ref 11.5–15.5)
LYMPHOCYTES # BLD AUTO: 1.23 K/UL — SIGNIFICANT CHANGE UP (ref 1–3.3)
LYMPHOCYTES # BLD AUTO: 13 % — SIGNIFICANT CHANGE UP (ref 13–44)
MACROCYTES BLD QL: SLIGHT — SIGNIFICANT CHANGE UP
MAGNESIUM SERPL-MCNC: 3.6 MG/DL — HIGH (ref 1.6–2.6)
MANUAL SMEAR VERIFICATION: SIGNIFICANT CHANGE UP
MCHC RBC-ENTMCNC: 27.2 PG — SIGNIFICANT CHANGE UP (ref 27–34)
MCHC RBC-ENTMCNC: 28.5 GM/DL — LOW (ref 32–36)
MCV RBC AUTO: 95.5 FL — SIGNIFICANT CHANGE UP (ref 80–100)
METHOD TYPE: SIGNIFICANT CHANGE UP
MONOCYTES # BLD AUTO: 0.09 K/UL — SIGNIFICANT CHANGE UP (ref 0–0.9)
MONOCYTES NFR BLD AUTO: 1 % — LOW (ref 2–14)
NEUTROPHILS # BLD AUTO: 8.14 K/UL — HIGH (ref 1.8–7.4)
NEUTROPHILS NFR BLD AUTO: 85 % — HIGH (ref 43–77)
NEUTS BAND # BLD: 1 % — SIGNIFICANT CHANGE UP (ref 0–8)
NRBC # BLD: 0 /100 — SIGNIFICANT CHANGE UP (ref 0–0)
PHOSPHATE SERPL-MCNC: 3.9 MG/DL — SIGNIFICANT CHANGE UP (ref 2.5–4.5)
PLAT MORPH BLD: NORMAL — SIGNIFICANT CHANGE UP
PLATELET # BLD AUTO: 41 K/UL — LOW (ref 150–400)
POIKILOCYTOSIS BLD QL AUTO: SLIGHT — SIGNIFICANT CHANGE UP
POLYCHROMASIA BLD QL SMEAR: SLIGHT — SIGNIFICANT CHANGE UP
POTASSIUM SERPL-MCNC: 4 MMOL/L — SIGNIFICANT CHANGE UP (ref 3.5–5.3)
POTASSIUM SERPL-MCNC: 4 MMOL/L — SIGNIFICANT CHANGE UP (ref 3.5–5.3)
POTASSIUM SERPL-SCNC: 4 MMOL/L — SIGNIFICANT CHANGE UP (ref 3.5–5.3)
POTASSIUM SERPL-SCNC: 4 MMOL/L — SIGNIFICANT CHANGE UP (ref 3.5–5.3)
PROT SERPL-MCNC: 5.5 G/DL — LOW (ref 6–8.3)
RBC # BLD: 3.53 M/UL — LOW (ref 3.8–5.2)
RBC # FLD: 18 % — HIGH (ref 10.3–14.5)
RBC BLD AUTO: ABNORMAL
SODIUM SERPL-SCNC: 152 MMOL/L — HIGH (ref 135–145)
SODIUM SERPL-SCNC: 155 MMOL/L — HIGH (ref 135–145)
WBC # BLD: 9.46 K/UL — SIGNIFICANT CHANGE UP (ref 3.8–10.5)
WBC # FLD AUTO: 9.46 K/UL — SIGNIFICANT CHANGE UP (ref 3.8–10.5)

## 2019-10-14 PROCEDURE — 99223 1ST HOSP IP/OBS HIGH 75: CPT

## 2019-10-14 PROCEDURE — 99497 ADVNCD CARE PLAN 30 MIN: CPT | Mod: 25

## 2019-10-14 PROCEDURE — 93923 UPR/LXTR ART STDY 3+ LVLS: CPT | Mod: 26

## 2019-10-14 RX ORDER — SIMVASTATIN 20 MG/1
10 TABLET, FILM COATED ORAL AT BEDTIME
Refills: 0 | Status: DISCONTINUED | OUTPATIENT
Start: 2019-10-14 | End: 2019-10-23

## 2019-10-14 RX ORDER — MEMANTINE HYDROCHLORIDE 10 MG/1
10 TABLET ORAL
Refills: 0 | Status: DISCONTINUED | OUTPATIENT
Start: 2019-10-14 | End: 2019-10-22

## 2019-10-14 RX ORDER — ACETAMINOPHEN 500 MG
650 TABLET ORAL EVERY 8 HOURS
Refills: 0 | Status: DISCONTINUED | OUTPATIENT
Start: 2019-10-14 | End: 2019-10-23

## 2019-10-14 RX ADMIN — MEROPENEM 100 MILLIGRAM(S): 1 INJECTION INTRAVENOUS at 06:17

## 2019-10-14 RX ADMIN — SIMVASTATIN 10 MILLIGRAM(S): 20 TABLET, FILM COATED ORAL at 23:12

## 2019-10-14 RX ADMIN — MEROPENEM 100 MILLIGRAM(S): 1 INJECTION INTRAVENOUS at 17:30

## 2019-10-14 RX ADMIN — MEMANTINE HYDROCHLORIDE 10 MILLIGRAM(S): 10 TABLET ORAL at 23:12

## 2019-10-14 RX ADMIN — Medication 650 MILLIGRAM(S): at 23:23

## 2019-10-14 NOTE — CONSULT NOTE ADULT - PROBLEM SELECTOR RECOMMENDATION 6
patient with fast score of >7c.  Currently taking Namenda, but would consider discontinuing for side effect profile. Patient would benefit from hospice services.

## 2019-10-14 NOTE — CONSULT NOTE ADULT - PROBLEM SELECTOR RECOMMENDATION 3
Patient with minimal intake and possible dysphagia secondary to her dementia. Appreciate Dietary involvement. Patient has feeding tube.

## 2019-10-14 NOTE — PROGRESS NOTE ADULT - SUBJECTIVE AND OBJECTIVE BOX
Patient is a 89y old  Female who presents with a chief complaint of respiratory distress, fever (12 Oct 2019 16:04)      HPI:  89 year old female patient, bedbound, non verbal AAO x0 at baseline from [facility], with PMH significant for HTN, DM, HLD and dementia is sent from NH due to fever of 102F, congestion and oxygen saturation of 81%. On arrival, patient was noted to have rectal temperature of 105F, , O2 sat of 88%. She was placed on non-rebreather which improved SPO2 to 95%. In ED, it was noted that G tube was not draining and was erythematous no purulent discharge was noted. G-tube was replaced by ED provider. Her UA was negative, chest Xray showed: new left sided infiltrate at base.  As per charts, patient was being treated with zosyn for osteomyelitis of left foot for last 2 days. However, she continued to spike fever.     History was limited, patient's family was at bedside. Had detailed discussion about GOC, she is DNR/DNI, no pressor support or central line. (12 Oct 2019 12:37)    Seen bedside this AM by podiatry for left heel ulcer.  Patient was not alert, aware, nor oriented.  Patient groans during dressing change.    PMH:Dementia  HTN (hypertension)  Hyperlipidemia    Allergies: No Known Allergies    Medications: albumin human 25% IVPB 50 milliLiter(s) IV Intermittent every 6 hours  dextrose 5%. 1000 milliLiter(s) IV Continuous <Continuous>  heparin  Injectable 5000 Unit(s) SubCutaneous every 12 hours  metroNIDAZOLE  IVPB 500 milliGRAM(s) IV Intermittent every 8 hours    FH:  PSX: No significant past surgical history    SH: albumin human 25% IVPB 50 milliLiter(s) IV Intermittent every 6 hours  dextrose 5%. 1000 milliLiter(s) IV Continuous <Continuous>  heparin  Injectable 5000 Unit(s) SubCutaneous every 12 hours  metroNIDAZOLE  IVPB 500 milliGRAM(s) IV Intermittent every 8 hours        Vital Signs Last 24 Hrs  T(C): 36.7 (14 Oct 2019 08:01), Max: 36.8 (13 Oct 2019 16:07)  T(F): 98.1 (14 Oct 2019 08:01), Max: 98.2 (13 Oct 2019 16:07)  HR: 66 (14 Oct 2019 08:01) (61 - 66)  BP: 117/43 (14 Oct 2019 08:01) (110/51 - 117/43)  BP(mean): --  RR: 19 (14 Oct 2019 08:01) (19 - 20)  SpO2: 93% (14 Oct 2019 08:01) (93% - 98%)      LABS                        9.6    9.46  )-----------( 41       ( 14 Oct 2019 07:14 )             33.7   10-14    152<H>  |  123<H>  |  97<H>  ----------------------------<  110<H>  4.0   |  24  |  1.46<H>    Ca    7.2<L>      14 Oct 2019 07:14  Phos  3.9     10-14  Mg     3.6     10-14    TPro  5.5<L>  /  Alb  2.1<L>  /  TBili  0.5  /  DBili  x   /  AST  62<H>  /  ALT  41  /  AlkPhos  53  10-14    WBC Count: 9.46 K/uL (10-14 @ 07:14)  WBC Count: 12.10 K/uL (10-13 @ 06:00)  WBC Count: 12.42 K/uL (10-12 @ 10:03)                  <L< from: Xray Foot AP + Lateral, Left (10.12.19 @ 13:08) >      EXAM:  FOOT 2VIEWS LT                            PROCEDURE DATE:  10/12/2019          INTERPRETATION:  Radiographs of the left foot         CLINICAL INFORMATION:  Concern for osteomyelitis sepsis    TECHNIQUE: Vertebral AP and lateral views of the foot were obtained.    FINDINGS:   No prior similar studies are available for review.    The forefoot demonstrates no evidence of fracture. Normal alignment is   seen. Joint spaces are preserved. Sesamoids are intact.    The midfoot appears intact.    The hindfoot demonstrates no plantar calcaneal spur.    There is soft tissue ulceration seen at the level of the heel. There is   focal sclerosis of the posterior calcaneus and heterogeneous density of   the bony structures diffusely.    IMPRESSION: Diffuse osteopenia and heterogeneity in the density of the   bony structures. There is more focal increased density at the level of   the calcaneus where the soft tissue ulcer is seen. Vasculitis cannot be   excluded and recommend correlation with MR imaging of the foot       EUGENE EMERSON M.D.,ATTENDING RADIOLOGIST  This document has been electronically signed. Oct 12 2019  2:35PM    < end of copied text >    >      12 Oct 2019 14:45    TPro  6.7  /  Alb  1.8<L>  /  TBili  0.4  /  DBili  x   /  AST  55<H>  /  ALT  51  /  AlkPhos  65  10-12      PHYSICAL EXAM  GEN: MILTON GUIDRY is a pleasant well-nourished, well developed 89y Female in no acute distress, alert awake, and oriented to person, place and time.   LE Focused:    Vasc:  DP and PT pulses non-palpable b/l, +2 pitting edema noted b/l.  CFT delayed 5 seconds to digits.  Derm: Left heel ulcer measuring 5.0cm x 4.6cm x 0.5cm with red granular base and small area of fibrotic tissue. Probes to bone.  No malodor, no purulence, no increase in warmth, minimal periwound erythema.    Neuro: unable to assess  MSK: Patient is in an overall contracted position        Cultures:  taken 10/12: pending    A: Left heel ulcer down to the level of bone with likely underlying osteomyelitis      P:  Patient evaluated, chart reviewed  Xrays reviewed- as above  Culture taken of left heel ulcer 10/12- pending  Wound dressed with betadine, 4x4 gauze, Carmen  Recommend dressing changes with santyl, 4x4 gauze, Carmen  Recommend continued Heel offloading in bed  Recommend IV antibiotics per primary team or ID  COLLIN/PVR ordered  Consider MRI later next week to further evaluate extent of affected bones pending patient's overall health  Podiatry will follow while in-house  Will consider bedside bone biopsy pending patient's overall health, ID recommendations, and further discussion with the family  Discussed with attending Dr. Mcfadden

## 2019-10-14 NOTE — GOALS OF CARE CONVERSATION - ADVANCED CARE PLANNING - TREATMENT GUIDELINE COMMENT
DNR/DNI/Limited medical Interventions/ send to hospital as needed/Long term feeding tube as needed/ Trial of IV fluids and use antibiotics

## 2019-10-14 NOTE — DIETITIAN INITIAL EVALUATION ADULT. - PROBLEM SELECTOR PLAN 9
Goals of care were discussed in detail with patient's daughter Ms. Anya Elkins [phone], [phone]. She wants her mother to be comfortable but states that she is a fighter and hopes that she will get better. She requested her to be DNR/DNI, no central line and no pressors to be given. She was informed about her mother's medical condition and that prognosis remains poor. Patient's brother Mr Rosa [phone] was also present during discussion. Support was provided.

## 2019-10-14 NOTE — PROGRESS NOTE ADULT - SUBJECTIVE AND OBJECTIVE BOX
HPI:  89 year old female patient, bedbound, non verbal AAO x0 at baseline from [facility], with PMH significant for HTN, DM, HLD and dementia is sent from NH due to fever of 102F, congestion and oxygen saturation of 81%. On arrival, patient was noted to have rectal temperature of 105F, , O2 sat of 88%. She was placed on non-rebreather which improved SPO2 to 95%. In ED, it was noted that G tube was not draining and was erythematous no purulent discharge was noted. G-tube was replaced by ED provider. Her UA was negative, chest Xray showed: new left sided infiltrate at base.  As per charts, patient was being treated with zosyn for osteomyelitis of left foot for last 2 days. However, she continued to spike fever.     History was limited, patient's family was at bedside. Had detailed discussion about GOC, she is DNR/DNI, no pressor support or central line. (12 Oct 2019 12:37)      Patient is a 89y old  Female who presents with a chief complaint of respiratory distress, fever (14 Oct 2019 10:53)      INTERVAL HPI/OVERNIGHT EVENTS:  T(C): 36.7 (10-14-19 @ 08:01), Max: 36.8 (10-13-19 @ 16:07)  HR: 66 (10-14-19 @ 08:01) (61 - 66)  BP: 117/43 (10-14-19 @ 08:01) (110/51 - 117/43)  RR: 19 (10-14-19 @ 08:01) (19 - 20)  SpO2: 93% (10-14-19 @ 08:01) (93% - 98%)  Wt(kg): --  I&O's Summary    13 Oct 2019 07:01  -  14 Oct 2019 07:00  --------------------------------------------------------  IN: 1000 mL / OUT: 1650 mL / NET: -650 mL        REVIEW OF SYSTEMS: denies fever, chills, SOB, palpitations, chest pain, abdominal pain, nausea, vomitting, diarrhea, constipation, dizziness    MEDICATIONS  (STANDING):  meropenem  IVPB 500 milliGRAM(s) IV Intermittent every 12 hours    MEDICATIONS  (PRN):      PHYSICAL EXAM:  GENERAL: NAD, well-groomed, well-developed  HEAD:  Atraumatic, Normocephalic  EYES: EOMI, PERRLA, conjunctiva and sclera clear  ENMT: No tonsillar erythema, exudates, or enlargement; Moist mucous membranes, Good dentition, No lesions  NECK: Supple, No JVD, Normal thyroid  NERVOUS SYSTEM:  Alert & Oriented X3, Good concentration; Motor Strength 5/5 B/L upper and lower extremities; DTRs 2+ intact and symmetric  CHEST/LUNG: Clear to percussion bilaterally; No rales, rhonchi, wheezing, or rubs  HEART: Regular rate and rhythm; No murmurs, rubs, or gallops  ABDOMEN: Soft, Nontender, Nondistended; Bowel sounds present  EXTREMITIES:  2+ Peripheral Pulses, No clubbing, cyanosis, or edema  LYMPH: No lymphadenopathy noted  SKIN: No rashes or lesions  LABS:                        9.6    9.46  )-----------( 41       ( 14 Oct 2019 07:14 )             33.7     10-14    152<H>  |  123<H>  |  97<H>  ----------------------------<  110<H>  4.0   |  24  |  1.46<H>    Ca    7.2<L>      14 Oct 2019 07:14  Phos  3.9     10-14  Mg     3.6     10-14    TPro  5.5<L>  /  Alb  2.1<L>  /  TBili  0.5  /  DBili  x   /  AST  62<H>  /  ALT  41  /  AlkPhos  53  10-14    PT/INR - ( 13 Oct 2019 06:00 )   PT: 13.4 sec;   INR: 1.20 ratio             CAPILLARY BLOOD GLUCOSE

## 2019-10-14 NOTE — DIETITIAN INITIAL EVALUATION ADULT. - PROBLEM SELECTOR PLAN 7
-Patient has hx of HTN, will hold off to all anti-hypertensives given severe sepsis. DISPLAY PLAN FREE TEXT

## 2019-10-14 NOTE — CONSULT NOTE ADULT - PROBLEM SELECTOR RECOMMENDATION 5
Possible PNA vs. Osteomyelitis. Continue Antibiotics as ordered by primary team. Daughter would like to continue to use antibiotics.

## 2019-10-14 NOTE — DIETITIAN INITIAL EVALUATION ADULT. - PROBLEM SELECTOR PLAN 1
-Patient meets Sepsis criteria: WBC >12k, HR >90, RR>20, Temp >100.4F with PNA /osteomyelitis VS intra-abdominal pathology as suspected source of infection and lactic acidosis.   -S/p 2300cc IVF bolus and IV vancomycin, zosyn, metronidazole and cefepime in ED   -Follow blood cx and urine cx   -Follow lactic acid   -C/w IVF   -C/w broad spectrum antibiotics.   -Patient's overall prognosis remains poor, palliative care consulted.   -ID consult- Dr. Rick

## 2019-10-14 NOTE — DIETITIAN INITIAL EVALUATION ADULT. - PROBLEM SELECTOR PLAN 2
-Patient is noted to have hypernatremia of 161   -Likely secondary to dehydration and fever   -Follow urine lytes   -Free water deficit- 4.9L   -Follow BMP q 6 for now   -C/w D5   -C/w free water  via G tube   -nephrologist Dr Madrigal consulted.

## 2019-10-14 NOTE — PROGRESS NOTE ADULT - SUBJECTIVE AND OBJECTIVE BOX
[facility] : Progress Note :: [phone], (office [phone]),   Dr Madrigal / Dr Garcia / Dr Stafford / Dr Anaya / Dr Janelle GALVAN / Dr Barron / Dr Melgoza / Dr Joe martines  _____________________________________________________________________________________________  SNA better.      No Known Allergies    Hospital Medications:   MEDICATIONS  (STANDING):  acetaminophen    Suspension .. 650 milliGRAM(s) Oral every 8 hours  meropenem  IVPB 500 milliGRAM(s) IV Intermittent every 12 hours        VITALS:  T(F): 99 (10-14-19 @ 16:13), Max: 99 (10-14-19 @ 16:13)  HR: 70 (10-14-19 @ 16:13)  BP: 133/49 (10-14-19 @ 16:13)  RR: 19 (10-14-19 @ 16:13)  SpO2: 95% (10-14-19 @ 16:13)  Wt(kg): --    10-13 @ :  -  10-14 @ 07:00  --------------------------------------------------------  IN: 1000 mL / OUT: 1650 mL / NET: -650 mL    10-14 @ 07:  -  10-14 @ 16:39  --------------------------------------------------------  IN: 0 mL / OUT: 450 mL / NET: -450 mL        PHYSICAL EXAM:  Constitutional: NAD  HEENT: anicteric sclera, oropharynx clear.  Neck: No JVD  Respiratory: CTAB, no wheezes, rales or rhonchi  Cardiovascular: S1, S2, RRR  Gastrointestinal: BS+, soft, NT/ND  Extremities:  No peripheral edema  : No jackson.   Skin: No rashes      LABS:  10-14    152<H>  |  123<H>  |  97<H>  ----------------------------<  110<H>  4.0   |  24  |  1.46<H>    Ca    7.2<L>      14 Oct 2019 07:14  Phos  3.9     10-14  Mg     3.6     10-14    TPro  5.5<L>  /  Alb  2.1<L>  /  TBili  0.5  /  DBili      /  AST  62<H>  /  ALT  41  /  AlkPhos  53  10-14    Creatinine Trend: 1.46 <--, 1.48 <--, 1.64 <--, 1.78 <--, 1.92 <--, 1.79 <--, 2.06 <--                        9.6    9.46  )-----------( 41       ( 14 Oct 2019 07:14 )             33.7     Urine Studies:  Urinalysis Basic - ( 12 Oct 2019 10:20 )    Color: Yellow / Appearance: Clear / S.010 / pH:   Gluc:  / Ketone: Negative  / Bili: Negative / Urobili: Negative   Blood:  / Protein: 30 mg/dL / Nitrite: Negative   Leuk Esterase: Small / RBC: 0-2 /HPF / WBC 6-10 /HPF   Sq Epi:  / Non Sq Epi:  / Bacteria: Trace /HPF      Sodium, Random Urine: 49 mmol/L (10-12 @ 20:26)  Creatinine, Random Urine: 70 mg/dL (10-12 @ 20:26)    RADIOLOGY & ADDITIONAL STUDIES:

## 2019-10-14 NOTE — PROGRESS NOTE ADULT - PROBLEM SELECTOR PLAN 9
Patient's daughter Ms. Anya Elkins, [phone], [phone], wants her mother to be comfortable. She requested her to be DNR/DNI, no central line and no pressors to be given. She was informed about her mother's medical condition and that prognosis remains poor. Patient's brother Mr Rosa [phone] was also present during discussion. Support was provided.

## 2019-10-14 NOTE — CHART NOTE - NSCHARTNOTEFT_GEN_A_CORE
Upon Nutritional Assessment by the Registered Dietitian your patient was determined to meet criteria / has evidence of the following diagnosis/diagnoses:          [ ]  Mild Protein Calorie Malnutrition        [ ]  Moderate Protein Calorie Malnutrition        [ x] Severe Protein Calorie Malnutrition        [ ] Unspecified Protein Calorie Malnutrition        [ ] Underweight / BMI <19        [ ] Morbid Obesity / BMI > 40      Findings as based on:  •  Comprehensive nutrition assessment and consultation  •  Calorie counts (nutrient intake analysis)  •  Food acceptance and intake status from observations by staff  •  Follow up  •  Patient education  •  Intervention secondary to interdisciplinary rounds  •   concerns      Treatment:    The following diet has been recommended:      PROVIDER Section:     By signing this assessment you are acknowledging and agree with the diagnosis/diagnoses assigned by the Registered Dietitian    Comments:

## 2019-10-14 NOTE — PROGRESS NOTE ADULT - SUBJECTIVE AND OBJECTIVE BOX
HPI:  89 year old female patient, bedbound, non verbal AAO x0 at baseline from [facility], with PMH significant for HTN, DM, HLD and dementia is sent from NH due to fever of 102F, congestion and oxygen saturation of 81%.  Pts G tube was found to be malfunctioning upon arrival, replaced in the ED.  Pt admitted for LLL PNA.  Treating with IV antibiotics.    OVERNIGHT EVENTS:  No overnight events.    REVIEW OF SYSTEMS:  Limited due to patients mental status     CONSTITUTIONAL: No fever,     Vital Signs Last 24 Hrs  T(C): 37.2 (14 Oct 2019 16:13), Max: 37.2 (14 Oct 2019 16:13)  T(F): 99 (14 Oct 2019 16:13), Max: 99 (14 Oct 2019 16:13)  HR: 70 (14 Oct 2019 16:13) (62 - 70)  BP: 133/49 (14 Oct 2019 16:13) (113/44 - 133/49)  BP(mean): --  RR: 19 (14 Oct 2019 16:13) (19 - 20)  SpO2: 95% (14 Oct 2019 16:13) (93% - 97%)    ________________________________________________  PHYSICAL EXAM:    GENERAL: NAD  HEENT: Normocephalic; conjunctivae and sclerae clear; moist mucous membranes;   NECK : supple, no JVD  CHEST/LUNG: Clear to auscultation bilaterally   HEART: S1 S2  regular  ABDOMEN: Soft, Nontender, Nondistended; Bowel sounds present  EXTREMITIES: no cyanosis; no LE edema; no calf tenderness  SKIN: warm and dry; no rash  NERVOUS SYSTEM:  Alert & Oriented x3; no new deficits    _________________________________________________  CURRENT MEDICATIONS:    MEDICATIONS  (STANDING):  acetaminophen    Suspension .. 650 milliGRAM(s) Oral every 8 hours  meropenem  IVPB 500 milliGRAM(s) IV Intermittent every 12 hours    MEDICATIONS  (PRN):      __________________________________________________  LABS:                          9.6    9.46  )-----------( 41       ( 14 Oct 2019 07:14 )             33.7     10-14    152<H>  |  123<H>  |  97<H>  ----------------------------<  110<H>  4.0   |  24  |  1.46<H>    Ca    7.2<L>      14 Oct 2019 07:14  Phos  3.9     10-14  Mg     3.6     10-14    TPro  5.5<L>  /  Alb  2.1<L>  /  TBili  0.5  /  DBili  x   /  AST  62<H>  /  ALT  41  /  AlkPhos  53  10-14    PT/INR - ( 13 Oct 2019 06:00 )   PT: 13.4 sec;   INR: 1.20 ratio             CAPILLARY BLOOD GLUCOSE          __________________________________________________  RADIOLOGY & ADDITIONAL TESTS:    Imaging Personally Reviewed:  YES    < from: Xray Chest 1 View-PORTABLE IMMEDIATE (10.12.19 @ 11:02) >  FINDINGS/  IMPRESSION:  Nonspecific prominence right hilum unchanged. Increased interstitial lung   markings. Nonspecific new small densities left lung base. Follow-up   recommended.    Linear atelectasis right lung base. No pleural effusions     Heart size cannot be accurately assessed in this projection, but appear   enlarged.    < end of copied text >  < from: Xray Foot AP + Lateral, Left (10.12.19 @ 13:08) >  FINDINGS:   No prior similar studies are available for review.    The forefoot demonstrates no evidence of fracture. Normal alignment is   seen. Joint spaces are preserved. Sesamoids are intact.    The midfoot appears intact.    The hindfoot demonstrates no plantar calcaneal spur.    There is soft tissue ulceration seen at the level of the heel. There is   focal sclerosis of the posterior calcaneus and heterogeneous density of   the bony structures diffusely.    IMPRESSION: Diffuse osteopenia and heterogeneity in the density of the   bony structures. There is more focal increased density at the level of   the calcaneus where the soft tissue ulcer is seen. Vasculitis cannot be   excluded and recommend correlation with MR imaging of the foot       < end of copied text >    Consultant(s) Notes Reviewed:   YES

## 2019-10-14 NOTE — DIETITIAN INITIAL EVALUATION ADULT. - PROBLEM SELECTOR PLAN 4
-Patient's G tube was not functioning, looked erythematous   -G tube replaced by ED provider  -Resume feeds once stable. Will keep NPO for now.

## 2019-10-14 NOTE — DIETITIAN INITIAL EVALUATION ADULT. - OTHER INFO
Pt visited. Pt appears lethargic , Non responsive. Pt is from NH.  G tube Replaced in ER.  At present pt is NPO and Receiving Free water via PEG. Pt with severe Sepsis, Hypernatremia, severe Dehydration,. Pt w Multiple pressure Ulcers. from Stage 1 to UnstagebLE , Suspected DTI , Pt is DNR/ DNI   Poor Prognosis. Pt visited. Pt appears lethargic , Non responsive. Pt is from NH.  G tube Replaced in ER.  At present pt is NPO and Receiving Free water via PEG. Pt with severe Sepsis, Hypernatremia, severe Dehydration,. Pt w Multiple pressure Ulcers. from Stage 1 to UnstagebLE , Suspected DTI , Pt is DNR/ DNI   Poor Prognosis. Weight History Not available.

## 2019-10-14 NOTE — CONSULT NOTE ADULT - SUBJECTIVE AND OBJECTIVE BOX
HPI:  89 year old female patient, bedbound, non verbal AAO x0 at baseline from [facility], with PMH significant for HTN, DM, HLD and dementia is sent from NH due to fever of 102F, congestion and oxygen saturation of 81%. On arrival, patient was noted to have rectal temperature of 105F, , O2 sat of 88%. She was placed on non-rebreather which improved SPO2 to 95%. In ED, it was noted that G tube was not draining and was erythematous no purulent discharge was noted. G-tube was replaced by ED provider. Her UA was negative, chest Xray showed: new left sided infiltrate at base.  As per charts, patient was being treated with zosyn for osteomyelitis of left foot for last 2 days. However, she continued to spike fever.     History was limited, patient's family was at bedside. Had detailed discussion about GOC, she is DNR/DNI, no pressor support or central line. (12 Oct 2019 12:37)    PERTINENT PM/SXH:   Dementia  HTN (hypertension)  Hyperlipidemia    No significant past surgical history    FAMILY HISTORY:      SOCIAL HISTORY:   Significant other/partner: Yes [ ]  No [ ] Children:  Yes [ ]  No [ ] Confucianism/Spirituality:  Substance hx: Yes[ ]  No [ ]   Tobacco hx:  Yes [ ] No [ ]   Alcohol hx: Yes [ ] No [ ]   Home Opioid hx:  [ ] I-Stop Reference No:  Living Situation: [ ]Home  [ ]Long term care  [ ]Rehab [ ]Other    ADVANCE DIRECTIVES:    DNR  Yes  MOLST  [ ]  Living Will  [ ]   DECISION MAKER(s):  [ ] Health Care Proxy(s)  [ ] Surrogate(s)  [ ] Guardian           Name(s): Phone Number(s):    BASELINE (I)ADL(s) (prior to admission):  [place]: [ ]Total  [ ] Moderate [ ]Dependent    Allergies    No Known Allergies    Intolerances    MEDICATIONS  (STANDING):  acetaminophen    Suspension .. 650 milliGRAM(s) Oral every 8 hours  meropenem  IVPB 500 milliGRAM(s) IV Intermittent every 12 hours    MEDICATIONS  (PRN):    PRESENT SYMPTOMS: [ ]Unable to obtain due to poor mentation   Source if other than patient:  [ ]Family   [ ]Team     Pain (Impact on QOL):    Location -   Severity -        Minimal acceptable level (0-10 scale):  Quality:   Onset:   Duration:                 Aggravating factors -  Relieving factors -  Radiation -    PAIN AD Score:     http://geriatrictoolkit.[email or web address]/painad.pdf (press ctrl +  left click to view)    Dyspnea:  Yes [ ] No [ ] - [ ]Mild [ ]Moderate [ ]Severe  Anxiety:    Yes [ ] No [ ] - [ ]Mild [ ]Moderate [ ]Severe  Fatigue:    Yes [ ] No [ ] - [ ]Mild [ ]Moderate [ ]Severe  Nausea:    Yes [ ] No [ ] - [ ]Mild [ ]Moderate [ ]Severe                         Loss of appetite: Yes [ ] No [ ] - [ ]Mild [ ]Moderate [ ]Severe             Constipation:  Yes [ ] No [ ] - [ ]Mild [ ]Moderate [ ]Severe  Grief:  Yes [ ] No [ ]     Other Symptoms:  [ ]All other review of systems negative     Karnofsky Performance Score/Palliative Performance Status Version 2:         %    http://palliative.info/resource_material/PPSv2.pdf  PHYSICAL EXAM:  Vital Signs Last 24 Hrs  T(C): 37.2 (14 Oct 2019 16:13), Max: 37.2 (14 Oct 2019 16:13)  T(F): 99 (14 Oct 2019 16:13), Max: 99 (14 Oct 2019 16:13)  HR: 70 (14 Oct 2019 16:13) (62 - 70)  BP: 133/49 (14 Oct 2019 16:13) (113/44 - 133/49)  BP(mean): --  RR: 19 (14 Oct 2019 16:13) (19 - 20)  SpO2: 95% (14 Oct 2019 16:13) (93% - 97%) I&O's Summary    13 Oct 2019 07:01  -  14 Oct 2019 07:00  --------------------------------------------------------  IN: 1000 mL / OUT: 1650 mL / NET: -650 mL    14 Oct 2019 07:01  -  14 Oct 2019 17:58  --------------------------------------------------------  IN: 0 mL / OUT: 450 mL / NET: -450 mL    GENERAL:  [ ]Alert  [ ]Oriented x   [ ]Lethargic  [ ]Cachexia  [ ]Unarousable  [ ]Verbal  [ ]Non-Verbal  Behavioral:   [ ] Anxiety  [ ] Delirium [ ] Agitation [ ] Other  HEENT:  [ ]Normal   [ ]Dry mouth   [ ]ET Tube/Trach  [ ]Oral lesions  PULMONARY:   [ ]Clear  [ ]Tachypnea  [ ]Audible excessive secretions   [ ]Rhonchi        [ ]Right [ ]Left [ ]Bilateral  [ ]Crackles        [ ]Right [ ]Left [ ]Bilateral  [ ]Wheezing     [ ]Right [ ]Left [ ]Bilateral  CARDIOVASCULAR:    [ ]Regular [ ]Irregular [ ]Tachy  [ ]Marty [ ]Murmur [ ]Other  GASTROINTESTINAL:  [ ]Soft  [ ]Distended   [ ]+BS  [ ]Non tender [ ]Tender  [ ]PEG [ ]OGT/ NGT  Last BM:     GENITOURINARY:  [ ]Normal [ ] Incontinent   [ ]Oliguria/Anuria   [ ]Lugo  MUSCULOSKELETAL:   [ ]Normal   [ ]Weakness  [ ]Bed/Wheelchair bound [ ]Edema  NEUROLOGIC:   [ ]No focal deficits  [ ] Cognitive impairment  [ ] Dysphagia [ ]Dysarthria [ ] Paresis [ ]Other   SKIN:   [ ]Normal   [ ]Pressure ulcer(s)  [ ]Rash    LABS:                        9.6    9.46  )-----------( 41       ( 14 Oct 2019 07:14 )             33.7   10-14    152<H>  |  123<H>  |  97<H>  ----------------------------<  110<H>  4.0   |  24  |  1.46<H>    Ca    7.2<L>      14 Oct 2019 07:14  Phos  3.9     10-14  Mg     3.6     10-14    TPro  5.5<L>  /  Alb  2.1<L>  /  TBili  0.5  /  DBili  x   /  AST  62<H>  /  ALT  41  /  AlkPhos  53  10-14  PT/INR - ( 13 Oct 2019 06:00 )   PT: 13.4 sec;   INR: 1.20 ratio               RADIOLOGY & ADDITIONAL STUDIES:    PROTEIN CALORIE MALNUTRITION PRESENT: [ ] Yes [ ] No  [ ] PPSV2 < or = to 30% [ ] significant weight loss  [ ] poor nutritional intake [ ] catabolic state [ ] anasarca     Albumin, Serum: 2.1 g/dL (10-14-19 @ 07:14)      REFERRALS:   [ ]Chaplaincy  [ ] Hospice  [ ]Child Life  [ ]Social Work  [ ]Case management [ ]Holistic Therapy   Goals of Care Discussion Document: HPI:  89 year old female patient, bedbound, non verbal AAO x0 at baseline from [facility], with PMH significant for HTN, DM, HLD and dementia is sent from NH due to fever of 102F, congestion and oxygen saturation of 81%. On arrival, patient was noted to have rectal temperature of 105F, , O2 sat of 88%. She was placed on non-rebreather which improved SPO2 to 95%. In ED, it was noted that G tube was not draining and was erythematous no purulent discharge was noted. G-tube was replaced by ED provider. Her UA was negative, chest Xray showed: new left sided infiltrate at base.  As per charts, patient was being treated with zosyn for osteomyelitis of left foot for last 2 days. However, she continued to spike fever.     History was limited, patient's family was at bedside. Had detailed discussion about GOC, she is DNR/DNI, no pressor support or central line. (12 Oct 2019 12:37)    Patient examined while resting in bed in no distress. Daughter at bedside.    PERTINENT PM/SXH:   Dementia  HTN (hypertension)  Hyperlipidemia    No significant past surgical history    FAMILY HISTORY:      SOCIAL HISTORY:   Significant other/partner: Yes [ ]  No [x ] Children:  Yes [ x]  No [ ] Spiritism/Spirituality:  Substance hx: Yes[ ]  No [ x]   Tobacco hx:  Yes [ ] No [ ]   Alcohol hx: Yes [ ] No [x ]   Home Opioid hx:  [ ] I-Stop Reference No:  Living Situation: [x ]Home  [ ]Long term care  [ ]Rehab [ ]Other    ADVANCE DIRECTIVES:    DNR  Yes  MOLST  [x ]  Living Will  [ ]   DECISION MAKER(s):  [x ] Health Care Proxy(s)  [ ] Surrogate(s)  [ ] Guardian           Name(s): Phone Number(s): Anya Zhang Home: [phone] / Cell: [phone]    BASELINE (I)ADL(s) (prior to admission):  [place]: [ ]Total  [ ] Moderate [x ]Dependent    Allergies    No Known Allergies    Intolerances    MEDICATIONS  (STANDING):  acetaminophen    Suspension .. 650 milliGRAM(s) Oral every 8 hours  meropenem  IVPB 500 milliGRAM(s) IV Intermittent every 12 hours    MEDICATIONS  (PRN):    PRESENT SYMPTOMS: [ ]Unable to obtain due to poor mentation   Source if other than patient:  [ ]Family   [ ]Team     Pain (Impact on QOL):    Location -   Severity -        Minimal acceptable level (0-10 scale):  Quality:   Onset:   Duration:                 Aggravating factors -  Relieving factors -  Radiation -    PAIN AD Score:     http://geriatrictoolkit.[email or web address]/painad.pdf (press ctrl +  left click to view)    Dyspnea:  Yes [ ] No [ ] - [ ]Mild [ ]Moderate [ ]Severe  Anxiety:    Yes [ ] No [ ] - [ ]Mild [ ]Moderate [ ]Severe  Fatigue:    Yes [ ] No [ ] - [ ]Mild [ ]Moderate [ ]Severe  Nausea:    Yes [ ] No [ ] - [ ]Mild [ ]Moderate [ ]Severe                         Loss of appetite: Yes [ ] No [ ] - [ ]Mild [ ]Moderate [ ]Severe             Constipation:  Yes [ ] No [ ] - [ ]Mild [ ]Moderate [ ]Severe  Grief:  Yes [ ] No [ ]     Other Symptoms:  [ ]All other review of systems negative     Karnofsky Performance Score/Palliative Performance Status Version 2:         %    http://palliative.info/resource_material/PPSv2.pdf  PHYSICAL EXAM:  Vital Signs Last 24 Hrs  T(C): 37.2 (14 Oct 2019 16:13), Max: 37.2 (14 Oct 2019 16:13)  T(F): 99 (14 Oct 2019 16:13), Max: 99 (14 Oct 2019 16:13)  HR: 70 (14 Oct 2019 16:13) (62 - 70)  BP: 133/49 (14 Oct 2019 16:13) (113/44 - 133/49)  BP(mean): --  RR: 19 (14 Oct 2019 16:13) (19 - 20)  SpO2: 95% (14 Oct 2019 16:13) (93% - 97%) I&O's Summary    13 Oct 2019 07:01  -  14 Oct 2019 07:00  --------------------------------------------------------  IN: 1000 mL / OUT: 1650 mL / NET: -650 mL    14 Oct 2019 07:01  -  14 Oct 2019 17:58  --------------------------------------------------------  IN: 0 mL / OUT: 450 mL / NET: -450 mL    GENERAL:  [ ]Alert  [ ]Oriented x   [ ]Lethargic  [ ]Cachexia  [ ]Unarousable  [ ]Verbal  [ ]Non-Verbal  Behavioral:   [ ] Anxiety  [ ] Delirium [ ] Agitation [ ] Other  HEENT:  [ ]Normal   [ ]Dry mouth   [ ]ET Tube/Trach  [ ]Oral lesions  PULMONARY:   [ ]Clear  [ ]Tachypnea  [ ]Audible excessive secretions   [ ]Rhonchi        [ ]Right [ ]Left [ ]Bilateral  [ ]Crackles        [ ]Right [ ]Left [ ]Bilateral  [ ]Wheezing     [ ]Right [ ]Left [ ]Bilateral  CARDIOVASCULAR:    [ ]Regular [ ]Irregular [ ]Tachy  [ ]Marty [ ]Murmur [ ]Other  GASTROINTESTINAL:  [ ]Soft  [ ]Distended   [ ]+BS  [ ]Non tender [ ]Tender  [ ]PEG [ ]OGT/ NGT  Last BM:     GENITOURINARY:  [ ]Normal [ ] Incontinent   [ ]Oliguria/Anuria   [ ]Lugo  MUSCULOSKELETAL:   [ ]Normal   [ ]Weakness  [ ]Bed/Wheelchair bound [ ]Edema  NEUROLOGIC:   [ ]No focal deficits  [ ] Cognitive impairment  [ ] Dysphagia [ ]Dysarthria [ ] Paresis [ ]Other   SKIN:   [ ]Normal   [ ]Pressure ulcer(s)  [ ]Rash    LABS:                        9.6    9.46  )-----------( 41       ( 14 Oct 2019 07:14 )             33.7   10-14    152<H>  |  123<H>  |  97<H>  ----------------------------<  110<H>  4.0   |  24  |  1.46<H>    Ca    7.2<L>      14 Oct 2019 07:14  Phos  3.9     10-14  Mg     3.6     10-14    TPro  5.5<L>  /  Alb  2.1<L>  /  TBili  0.5  /  DBili  x   /  AST  62<H>  /  ALT  41  /  AlkPhos  53  10-14  PT/INR - ( 13 Oct 2019 06:00 )   PT: 13.4 sec;   INR: 1.20 ratio               RADIOLOGY & ADDITIONAL STUDIES:    PROTEIN CALORIE MALNUTRITION PRESENT: [ ] Yes [ ] No  [ ] PPSV2 < or = to 30% [ ] significant weight loss  [ ] poor nutritional intake [ ] catabolic state [ ] anasarca     Albumin, Serum: 2.1 g/dL (10-14-19 @ 07:14)      REFERRALS:   [ ]Chaplaincy  [ ] Hospice  [ ]Child Life  [ ]Social Work  [ ]Case management [ ]Holistic Therapy   Goals of Care Discussion Document: HPI:  89 year old female patient, bedbound, non verbal AAO x0 at baseline from [facility], with PMH significant for HTN, DM, HLD and dementia is sent from NH due to fever of 102F, congestion and oxygen saturation of 81%. On arrival, patient was noted to have rectal temperature of 105F, , O2 sat of 88%. She was placed on non-rebreather which improved SPO2 to 95%. In ED, it was noted that G tube was not draining and was erythematous no purulent discharge was noted. G-tube was replaced by ED provider. Her UA was negative, chest Xray showed: new left sided infiltrate at base.  As per charts, patient was being treated with zosyn for osteomyelitis of left foot for last 2 days. However, she continued to spike fever.     History was limited, patient's family was at bedside. Had detailed discussion about GOC, she is DNR/DNI, no pressor support or central line. (12 Oct 2019 12:37)    Patient examined while resting in bed in no distress. Daughter at bedside.    PERTINENT PM/SXH:   Dementia  HTN (hypertension)  Hyperlipidemia    No significant past surgical history    FAMILY HISTORY:      SOCIAL HISTORY:   Significant other/partner: Yes [ ]  No [x ] Children:  Yes [ x]  No [ ] Latter day/Spirituality:  Substance hx: Yes[ ]  No [ x]   Tobacco hx:  Yes [ ] No [ ]   Alcohol hx: Yes [ ] No [x ]   Home Opioid hx:  [ ] I-Stop Reference No:  Living Situation: [ ]Home  [x ]Long term care  [ ]Rehab [ ]Other    ADVANCE DIRECTIVES:    DNR  Yes  MOLST  [x ]  Living Will  [ ]   DECISION MAKER(s):  [x ] Health Care Proxy(s)  [ ] Surrogate(s)  [ ] Guardian           Name(s): Phone Number(s): Anya Zhang Home: [phone] / Cell: [phone]    BASELINE (I)ADL(s) (prior to admission):  [place]: [ ]Total  [ ] Moderate [x ]Dependent    Allergies    No Known Allergies    Intolerances    MEDICATIONS  (STANDING):  acetaminophen    Suspension .. 650 milliGRAM(s) Oral every 8 hours  meropenem  IVPB 500 milliGRAM(s) IV Intermittent every 12 hours    MEDICATIONS  (PRN):    PRESENT SYMPTOMS: [ ]Unable to obtain due to poor mentation   Source if other than patient:  [ ]Family   [ ]Team     Pain (Impact on QOL):    Location -   Severity -        Minimal acceptable level (0-10 scale):  Quality:   Onset:   Duration:                 Aggravating factors -  Relieving factors -  Radiation -    PAIN AD Score: 0    [email or web address]/painad.pdf (press ctrl +  left click to view)    Dyspnea:  Yes [ ] No [ ] - [ ]Mild [ ]Moderate [ ]Severe  Anxiety:    Yes [ ] No [ ] - [ ]Mild [ ]Moderate [ ]Severe  Fatigue:    Yes [ ] No [ ] - [ ]Mild [ ]Moderate [ ]Severe  Nausea:    Yes [ ] No [ ] - [ ]Mild [ ]Moderate [ ]Severe                         Loss of appetite: Yes [ ] No [ ] - [ ]Mild [ ]Moderate [ ]Severe             Constipation:  Yes [ ] No [ ] - [ ]Mild [ ]Moderate [ ]Severe  Grief:  Yes [ ] No [ ]     Other Symptoms:  [ ]All other review of systems negative     Karnofsky Performance Score/Palliative Performance Status Version 2:        20  %    http://palliative.info/resource_material/PPSv2.pdf  PHYSICAL EXAM:  Vital Signs Last 24 Hrs  T(C): 37.2 (14 Oct 2019 16:13), Max: 37.2 (14 Oct 2019 16:13)  T(F): 99 (14 Oct 2019 16:13), Max: 99 (14 Oct 2019 16:13)  HR: 70 (14 Oct 2019 16:13) (62 - 70)  BP: 133/49 (14 Oct 2019 16:13) (113/44 - 133/49)  BP(mean): --  RR: 19 (14 Oct 2019 16:13) (19 - 20)  SpO2: 95% (14 Oct 2019 16:13) (93% - 97%) I&O's Summary    13 Oct 2019 07:01  -  14 Oct 2019 07:00  --------------------------------------------------------  IN: 1000 mL / OUT: 1650 mL / NET: -650 mL    14 Oct 2019 07:01  -  14 Oct 2019 17:58  --------------------------------------------------------  IN: 0 mL / OUT: 450 mL / NET: -450 mL    GENERAL:  [ ]Alert  [ ]Oriented x   [ x]Lethargic  [ ]Cachexia  [ ]Unarousable  [ ]Verbal  [ ]Non-Verbal  Behavioral:   [ ] Anxiety  [ ] Delirium [ ] Agitation [x ] Other  HEENT:  [ ]Normal   [x ]Dry mouth   [ ]ET Tube/Trach  [ ]Oral lesions  PULMONARY:   [ ]Clear  [ ]Tachypnea  [ ]Audible excessive secretions   [x ]Rhonchi        [ ]Right [ ]Left [x ]Bilateral  [ ]Crackles        [ ]Right [ ]Left [ ]Bilateral  [ ]Wheezing     [ ]Right [ ]Left [ ]Bilateral  CARDIOVASCULAR:    [x ]Regular [ ]Irregular [ ]Tachy  [ ]Marty [ ]Murmur [ ]Other  GASTROINTESTINAL:  [ x]Soft  [ ]Distended   [ ]+BS  [x ]Non tender [ ]Tender  [x ]PEG [ ]OGT/ NGT  Last BM:     GENITOURINARY:  [ ]Normal [x ] Incontinent   [ ]Oliguria/Anuria   [ ]Lugo  MUSCULOSKELETAL:   [ ]Normal   [ ]Weakness  [ x]Bed/Wheelchair bound [ ]Edema  NEUROLOGIC:   [ ]No focal deficits  [ x] Cognitive impairment  [ ] Dysphagia [ ]Dysarthria [ ] Paresis [ ]Other   SKIN:   [ ]Normal   [ ]Pressure ulcer(s)  [ ]Rash [x] wound on left foot.    LABS:                        9.6    9.46  )-----------( 41       ( 14 Oct 2019 07:14 )             33.7   10-14    152<H>  |  123<H>  |  97<H>  ----------------------------<  110<H>  4.0   |  24  |  1.46<H>    Ca    7.2<L>      14 Oct 2019 07:14  Phos  3.9     10-14  Mg     3.6     10-14    TPro  5.5<L>  /  Alb  2.1<L>  /  TBili  0.5  /  DBili  x   /  AST  62<H>  /  ALT  41  /  AlkPhos  53  10-14  PT/INR - ( 13 Oct 2019 06:00 )   PT: 13.4 sec;   INR: 1.20 ratio         RADIOLOGY & ADDITIONAL STUDIES:  < from: Xray Foot AP + Lateral, Left (10.12.19 @ 13:08) >  EXAM:  FOOT 2VIEWS LT                            PROCEDURE DATE:  10/12/2019    IMPRESSION: Diffuse osteopenia and heterogeneity in the density of the   bony structures. There is more focal increased density at the level of   the calcaneus where the soft tissue ulcer is seen. Vasculitis cannot be   excluded and recommend correlation with MR imaging of the foot       < end of copied text >    < from: Xray Chest 1 View-PORTABLE IMMEDIATE (10.12.19 @ 11:02) >  EXAM:  XR CHEST PORTABLE IMMED 1V                            PROCEDURE DATE:  10/12/2019    FINDINGS/  IMPRESSION:  Nonspecific prominence right hilum unchanged. Increased interstitial lung   markings. Nonspecific new small densities left lung base. Follow-up   recommended.    Linear atelectasis right lung base. No pleural effusions     Heart size cannot be accurately assessed in this projection, but appear   enlarged.    < end of copied text >      PROTEIN CALORIE MALNUTRITION PRESENT: [x ] Yes [ ] No  [x ] PPSV2 < or = to 30% [ ] significant weight loss  [x ] poor nutritional intake [ ] catabolic state [ ] anasarca     Albumin, Serum: 2.1 g/dL (10-14-19 @ 07:14)      REFERRALS:   [ ]Chaplaincy  [ ] Hospice  [ ]Child Life  [ ]Social Work  [ ]Case management [ ]Holistic Therapy   Goals of Care Discussion Document:

## 2019-10-14 NOTE — CONSULT NOTE ADULT - PROBLEM SELECTOR RECOMMENDATION 7
Discussion had with patients daughter regarding her mothers disease. Daughter was very upset with the care that she had a [facility], as they let her get to how she is today. She would ultimately want her mother to be comfortable. A MOLST was reviewed again and she stated that DNR/DNI/Limited medical Interventions/ send to hospital as needed/Long term feeding tube as needed/ Trial of IV fluids and use antibiotics are the daughters wishes for her mother. MOLST filled out and placed in chart. Hospice was also discussed, but she is not ready to place her mother into hospice just yet. Emotional support was provided.

## 2019-10-15 DIAGNOSIS — D69.6 THROMBOCYTOPENIA, UNSPECIFIED: ICD-10-CM

## 2019-10-15 LAB
ANION GAP SERPL CALC-SCNC: 5 MMOL/L — SIGNIFICANT CHANGE UP (ref 5–17)
BUN SERPL-MCNC: 75 MG/DL — HIGH (ref 7–18)
CALCIUM SERPL-MCNC: 7.1 MG/DL — LOW (ref 8.4–10.5)
CHLORIDE SERPL-SCNC: 122 MMOL/L — HIGH (ref 96–108)
CO2 SERPL-SCNC: 24 MMOL/L — SIGNIFICANT CHANGE UP (ref 22–31)
CREAT SERPL-MCNC: 1.1 MG/DL — SIGNIFICANT CHANGE UP (ref 0.5–1.3)
GLUCOSE SERPL-MCNC: 108 MG/DL — HIGH (ref 70–99)
HCT VFR BLD CALC: 33.5 % — LOW (ref 34.5–45)
HGB BLD-MCNC: 9.9 G/DL — LOW (ref 11.5–15.5)
MCHC RBC-ENTMCNC: 27.8 PG — SIGNIFICANT CHANGE UP (ref 27–34)
MCHC RBC-ENTMCNC: 29.6 GM/DL — LOW (ref 32–36)
MCV RBC AUTO: 94.1 FL — SIGNIFICANT CHANGE UP (ref 80–100)
NRBC # BLD: 0 /100 WBCS — SIGNIFICANT CHANGE UP (ref 0–0)
PLATELET # BLD AUTO: 41 K/UL — LOW (ref 150–400)
POTASSIUM SERPL-MCNC: 3.5 MMOL/L — SIGNIFICANT CHANGE UP (ref 3.5–5.3)
POTASSIUM SERPL-SCNC: 3.5 MMOL/L — SIGNIFICANT CHANGE UP (ref 3.5–5.3)
RBC # BLD: 3.56 M/UL — LOW (ref 3.8–5.2)
RBC # FLD: 17.7 % — HIGH (ref 10.3–14.5)
SODIUM SERPL-SCNC: 151 MMOL/L — HIGH (ref 135–145)
WBC # BLD: 6.89 K/UL — SIGNIFICANT CHANGE UP (ref 3.8–10.5)
WBC # FLD AUTO: 6.89 K/UL — SIGNIFICANT CHANGE UP (ref 3.8–10.5)

## 2019-10-15 PROCEDURE — 99232 SBSQ HOSP IP/OBS MODERATE 35: CPT

## 2019-10-15 RX ORDER — MORPHINE SULFATE 50 MG/1
0.5 CAPSULE, EXTENDED RELEASE ORAL EVERY 6 HOURS
Refills: 0 | Status: DISCONTINUED | OUTPATIENT
Start: 2019-10-15 | End: 2019-10-17

## 2019-10-15 RX ADMIN — MEMANTINE HYDROCHLORIDE 10 MILLIGRAM(S): 10 TABLET ORAL at 17:20

## 2019-10-15 RX ADMIN — Medication 650 MILLIGRAM(S): at 21:41

## 2019-10-15 RX ADMIN — MEMANTINE HYDROCHLORIDE 10 MILLIGRAM(S): 10 TABLET ORAL at 06:27

## 2019-10-15 RX ADMIN — Medication 650 MILLIGRAM(S): at 07:15

## 2019-10-15 RX ADMIN — Medication 650 MILLIGRAM(S): at 00:15

## 2019-10-15 RX ADMIN — MORPHINE SULFATE 0.5 MILLIGRAM(S): 50 CAPSULE, EXTENDED RELEASE ORAL at 17:00

## 2019-10-15 RX ADMIN — Medication 650 MILLIGRAM(S): at 14:07

## 2019-10-15 RX ADMIN — SIMVASTATIN 10 MILLIGRAM(S): 20 TABLET, FILM COATED ORAL at 21:40

## 2019-10-15 RX ADMIN — Medication 650 MILLIGRAM(S): at 14:37

## 2019-10-15 RX ADMIN — MEROPENEM 100 MILLIGRAM(S): 1 INJECTION INTRAVENOUS at 17:20

## 2019-10-15 RX ADMIN — Medication 650 MILLIGRAM(S): at 06:26

## 2019-10-15 RX ADMIN — MEROPENEM 100 MILLIGRAM(S): 1 INJECTION INTRAVENOUS at 06:27

## 2019-10-15 RX ADMIN — Medication 650 MILLIGRAM(S): at 21:40

## 2019-10-15 RX ADMIN — MORPHINE SULFATE 0.5 MILLIGRAM(S): 50 CAPSULE, EXTENDED RELEASE ORAL at 17:20

## 2019-10-15 NOTE — PROGRESS NOTE ADULT - SUBJECTIVE AND OBJECTIVE BOX
HPI:  89 year old female patient, bedbound, non verbal AAO x0 at baseline from [facility], with PMH significant for HTN, DM, HLD and dementia is sent from NH due to fever of 102F, congestion and oxygen saturation of 81%.  Pts G tube was found to be malfunctioning upon arrival, replaced in the ED.  Pt admitted for LLL PNA.  Treating with IV antibiotics.  Podiatry doing bedside wound treatments.  Family deciding on invasive procedures.     OVERNIGHT EVENTS:  No overnight events reported    REVIEW OF SYSTEMS:  Limited due to patients mental status     CONSTITUTIONAL: No fever,     Vital Signs Last 24 Hrs  T(C): 36.8 (15 Oct 2019 15:39), Max: 36.8 (15 Oct 2019 08:20)  T(F): 98.2 (15 Oct 2019 15:39), Max: 98.2 (15 Oct 2019 08:20)  HR: 71 (15 Oct 2019 15:39) (66 - 71)  BP: 140/62 (15 Oct 2019 15:39) (109/47 - 140/62)  BP(mean): --  RR: 16 (15 Oct 2019 15:39) (16 - 18)  SpO2: 97% (15 Oct 2019 15:39) (97% - 97%)    ________________________________________________  PHYSICAL EXAM:    GENERAL: NAD  HEENT: Normocephalic; conjunctivae and sclerae clear; moist mucous membranes;   NECK : supple, no JVD  CHEST/LUNG: Clear to auscultation bilaterally   HEART: S1 S2  regular  ABDOMEN: Soft, Nontender, Nondistended; Bowel sounds present  EXTREMITIES: no cyanosis; no LE edema; no calf tenderness  SKIN: warm and dry; no rash  NERVOUS SYSTEM:  No new deficits    _________________________________________________  CURRENT MEDICATIONS:    MEDICATIONS  (STANDING):  acetaminophen    Suspension .. 650 milliGRAM(s) Oral every 8 hours  memantine 10 milliGRAM(s) Oral two times a day  meropenem  IVPB 500 milliGRAM(s) IV Intermittent every 12 hours  simvastatin 10 milliGRAM(s) Oral at bedtime    MEDICATIONS  (PRN):      __________________________________________________  LABS:                          9.9    6.89  )-----------( 41       ( 15 Oct 2019 08:13 )             33.5     10-15    151<H>  |  122<H>  |  75<H>  ----------------------------<  108<H>  3.5   |  24  |  1.10    Ca    7.1<L>      15 Oct 2019 08:13  Phos  3.9     10-14  Mg     3.6     10-14    TPro  5.5<L>  /  Alb  2.1<L>  /  TBili  0.5  /  DBili  x   /  AST  62<H>  /  ALT  41  /  AlkPhos  53  10-14        CAPILLARY BLOOD GLUCOSE          __________________________________________________  RADIOLOGY & ADDITIONAL TESTS:    Imaging Personally Reviewed:  YES    < from: Xray Chest 1 View-PORTABLE IMMEDIATE (10.12.19 @ 11:02) >  FINDINGS/  IMPRESSION:  Nonspecific prominence right hilum unchanged. Increased interstitial lung   markings. Nonspecific new small densities left lung base. Follow-up   recommended.    Linear atelectasis right lung base. No pleural effusions     Heart size cannot be accurately assessed in this projection, but appear   enlarged.      < end of copied text >  < from: Xray Foot AP + Lateral, Left (10.12.19 @ 13:08) >  IMPRESSION: Diffuse osteopenia and heterogeneity in the density of the   bony structures. There is more focal increased density at the level of   the calcaneus where the soft tissue ulcer is seen. Vasculitis cannot be   excluded and recommend correlation with MR imaging of the foot       < end of copied text >  < from: [facility] Physiol Extremity Lower 3+ Level, BI (10.14.19 @ 17:05) >  RIGHT  COLLIN: 1.77  Flow study: Pulsatile waveforms from the calf through the great toe    LEFT  COLLIN: 1.81  Flow study: Pulsatile waveforms from the calf through the great toe      Impression:     Elevated ankle brachial indices and flow study compatible with calcified   lower extremity arteries    < end of copied text >    Consultant(s) Notes Reviewed:   YES

## 2019-10-15 NOTE — PROGRESS NOTE ADULT - PROBLEM SELECTOR PLAN 9
Patient's daughter Ms. Anya Elkins, [phone], [phone], wants her mother to be comfortable.  Pt is DNR/DNI  Palliative care to discuss PICC with daughter

## 2019-10-15 NOTE — CONSULT NOTE ADULT - SUBJECTIVE AND OBJECTIVE BOX
Patient is a 89y old  Female who presents with a chief complaint of respiratory distress, fever (15 Oct 2019 16:42)      HPI:  89 year old female patient, bedbound, non verbal AAO x0 at baseline from [facility], with PMH significant for HTN, DM, HLD and dementia is sent from NH due to fever of 102F, congestion and oxygen saturation of 81%. On arrival, patient was noted to have rectal temperature of 105F, , O2 sat of 88%. She was placed on non-rebreather which improved SPO2 to 95%. In ED, it was noted that G tube was not draining and was erythematous no purulent discharge was noted. G-tube was replaced by ED provider. Her UA was negative, chest Xray showed: new left sided infiltrate at base.  As per charts, patient was being treated with zosyn for osteomyelitis of left foot for last 2 days. However, she continued to spike fever.     History was limited, patient's family was at bedside. Had detailed discussion about GOC, she is DNR/DNI, no pressor support or central line. (12 Oct 2019 12:37)       ROS:  Negative except for:    PAST MEDICAL & SURGICAL HISTORY:  Dementia  HTN (hypertension)  Hyperlipidemia  No significant past surgical history      SOCIAL HISTORY:    FAMILY HISTORY:      MEDICATIONS  (STANDING):  acetaminophen    Suspension .. 650 milliGRAM(s) Oral every 8 hours  memantine 10 milliGRAM(s) Oral two times a day  meropenem  IVPB 500 milliGRAM(s) IV Intermittent every 12 hours  simvastatin 10 milliGRAM(s) Oral at bedtime    MEDICATIONS  (PRN):  morphine  - Injectable 0.5 milliGRAM(s) IV Push every 6 hours PRN Severe Pain (7 - 10)      Allergies    No Known Allergies    Intolerances        Vital Signs Last 24 Hrs  T(C): 36.8 (15 Oct 2019 15:39), Max: 36.8 (15 Oct 2019 08:20)  T(F): 98.2 (15 Oct 2019 15:39), Max: 98.2 (15 Oct 2019 08:20)  HR: 71 (15 Oct 2019 15:39) (66 - 71)  BP: 140/62 (15 Oct 2019 15:39) (109/47 - 140/62)  BP(mean): --  RR: 16 (15 Oct 2019 15:39) (16 - 18)  SpO2: 97% (15 Oct 2019 15:39) (97% - 97%)    PHYSICAL EXAM  General: adult in NAD  HEENT: clear oropharynx, anicteric sclera, pink conjunctiva  Neck: supple  CV: normal S1/S2 with no murmur rubs or gallops  Lungs: positive air movement b/l ant lungs,clear to auscultation, no wheezes, no rales  Abdomen: soft non-tender non-distended, no hepatosplenomegaly  Ext: no clubbing cyanosis or edema  Skin: no rashes and no petechiae  Neuro: alert and oriented X 4, no focal deficits      LABS:                          9.9    6.89  )-----------( 41       ( 15 Oct 2019 08:13 )             33.5         Mean Cell Volume : 94.1 fl  Mean Cell Hemoglobin : 27.8 pg  Mean Cell Hemoglobin Concentration : 29.6 gm/dL  Auto Neutrophil # : x  Auto Lymphocyte # : x  Auto Monocyte # : x  Auto Eosinophil # : x  Auto Basophil # : x  Auto Neutrophil % : x  Auto Lymphocyte % : x  Auto Monocyte % : x  Auto Eosinophil % : x  Auto Basophil % : x      Serial CBC's  10-15 @ 08:13  Hct-33.5 / Hgb-9.9 / Plat-41 / RBC-3.56 / WBC-6.89  Serial CBC's  10-14 @ 07:14  Hct-33.7 / Hgb-9.6 / Plat-41 / RBC-3.53 / WBC-9.46  Serial CBC's  10-13 @ 06:00  Hct-33.9 / Hgb-9.6 / Plat-42 / RBC-3.39 / WBC-12.10  Serial CBC's  10-12 @ 10:03  Hct-43.0 / Hgb-12.2 / Plat-86 / RBC-4.34 / WBC-12.42      10-15    151<H>  |  122<H>  |  75<H>  ----------------------------<  108<H>  3.5   |  24  |  1.10    Ca    7.1<L>      15 Oct 2019 08:13  Phos  3.9     10-14  Mg     3.6     10-14    TPro  5.5<L>  /  Alb  2.1<L>  /  TBili  0.5  /  DBili  x   /  AST  62<H>  /  ALT  41  /  AlkPhos  53  10-14                      BLOOD SMEAR INTERPRETATION:       RADIOLOGY & ADDITIONAL STUDIES:  < from: CT Chest No Cont (01.25.19 @ 10:05) >  INTERPRETATION:  CLINICAL INFORMATION: Difficulty breathing. Evaluate for   pneumonia.    COMPARISON: CT chest dated 1/11/2014.    PROCEDURE:   CT of the Chest was performed without intravenous contrast.  Sagittal and coronal reformats were performed. MIPS were obtained.      FINDINGS:    CHEST:     LUNGS AND LARGE AIRWAYS: Patent central airways. Bibasilar subsegmental   atelectasis. Lingular calcified granuloma.  PLEURA: No pleural effusion.  VESSELS: Atheromatous disease of the aorta. Coronary artery   calcifications.  HEART: Heart size is normal. No pericardial effusion. Mitral annular   calcifications. Aortic valve calcifications.  MEDIASTINUM AND JAMSHID: No lymphadenopathy.  CHEST WALL AND LOWER NECK: Within normal limits.  VISUALIZED UPPER ABDOMEN: Gastrostomy tube noted to be in place. Left   renal parapelvic cysts. Status post cholecystectomy. Tiny renal hernia.  BONES: Chronic left posterior ninth, 10th, and 11th rib fractures.   Multilevel degenerative changes of the spine.    IMPRESSION:     No pneumonia.    < end of copied text >

## 2019-10-15 NOTE — PROGRESS NOTE ADULT - SUBJECTIVE AND OBJECTIVE BOX
HPI:  89 year old female patient, bedbound, non verbal AAO x0 at baseline from [facility], with PMH significant for HTN, DM, HLD and dementia is sent from NH due to fever of 102F, congestion and oxygen saturation of 81%. On arrival, patient was noted to have rectal temperature of 105F, , O2 sat of 88%. She was placed on non-rebreather which improved SPO2 to 95%. In ED, it was noted that G tube was not draining and was erythematous no purulent discharge was noted. G-tube was replaced by ED provider. Her UA was negative, chest Xray showed: new left sided infiltrate at base.  As per charts, patient was being treated with zosyn for osteomyelitis of left foot for last 2 days. However, she continued to spike fever.     History was limited, patient's family was at bedside. Had detailed discussion about GOC, she is DNR/DNI, no pressor support or central line. (12 Oct 2019 12:37)      Patient is a 89y old  Female who presents with a chief complaint of respiratory distress, fever (15 Oct 2019 08:34)      INTERVAL HPI/OVERNIGHT EVENTS:  T(C): 36.8 (10-15-19 @ 08:20), Max: 37.2 (10-14-19 @ 16:13)  HR: 66 (10-15-19 @ 08:20) (66 - 70)  BP: 130/55 (10-15-19 @ 08:20) (109/47 - 133/49)  RR: 17 (10-15-19 @ 08:20) (17 - 19)  SpO2: 97% (10-15-19 @ 08:20) (95% - 97%)  Wt(kg): --  I&O's Summary    14 Oct 2019 07:01  -  15 Oct 2019 07:00  --------------------------------------------------------  IN: 500 mL / OUT: 825 mL / NET: -325 mL        REVIEW OF SYSTEMS: denies fever, chills, SOB, palpitations, chest pain, abdominal pain, nausea, vomitting, diarrhea, constipation, dizziness    MEDICATIONS  (STANDING):  acetaminophen    Suspension .. 650 milliGRAM(s) Oral every 8 hours  memantine 10 milliGRAM(s) Oral two times a day  meropenem  IVPB 500 milliGRAM(s) IV Intermittent every 12 hours  simvastatin 10 milliGRAM(s) Oral at bedtime    MEDICATIONS  (PRN):      PHYSICAL EXAM:  GENERAL: NAD, well-groomed, well-developed  HEAD:  Atraumatic, Normocephalic  EYES: EOMI, PERRLA, conjunctiva and sclera clear  ENMT: No tonsillar erythema, exudates, or enlargement; Moist mucous membranes, Good dentition, No lesions  NECK: Supple, No JVD, Normal thyroid  NERVOUS SYSTEM:  Alert & Oriented X3, Good concentration; Motor Strength 5/5 B/L upper and lower extremities; DTRs 2+ intact and symmetric  CHEST/LUNG: Clear to percussion bilaterally; No rales, rhonchi, wheezing, or rubs  HEART: Regular rate and rhythm; No murmurs, rubs, or gallops  ABDOMEN: Soft, Nontender, Nondistended; Bowel sounds present  EXTREMITIES:  2+ Peripheral Pulses, No clubbing, cyanosis, or edema  LYMPH: No lymphadenopathy noted  SKIN: No rashes or lesions  LABS:                        9.9    6.89  )-----------( 41       ( 15 Oct 2019 08:13 )             33.5     10-15    151<H>  |  122<H>  |  75<H>  ----------------------------<  108<H>  3.5   |  24  |  1.10    Ca    7.1<L>      15 Oct 2019 08:13  Phos  3.9     10-14  Mg     3.6     10-14    TPro  5.5<L>  /  Alb  2.1<L>  /  TBili  0.5  /  DBili  x   /  AST  62<H>  /  ALT  41  /  AlkPhos  53  10-14        CAPILLARY BLOOD GLUCOSE

## 2019-10-15 NOTE — CONSULT NOTE ADULT - PROBLEM SELECTOR RECOMMENDATION 9
88 at admission and dropped to 41  there are giant platelet in smear  she may have some bone marrow disease with low baseline platelet.  the platelet drops during sepsis due to increased consumption and may be decreased production in sepsis  madication may play a role too.  she is not bleeding now  will hold transfusion unless there is bleeding  41 is ok, will watch carefully.  will not change medication at this moment
GAYLE in setting of sepsis and hypotension.  Nonoliguric and electrolytes within normal limits.   Ca level acceptable when corrected for marked hypoalbuminemia.   Can start gently hydration NS @75cc.hr x24 hours and reassess fluid and renal status.   Avoid NSAID and contrast. Monitor I/O.
Secondary to patients sepsis. Continue management as per primary team. Continue IV antibiotics as ordered by primary team.

## 2019-10-15 NOTE — PROGRESS NOTE ADULT - SUBJECTIVE AND OBJECTIVE BOX
[facility] : Progress Note :: [phone], (office [phone]),   Dr Madrigal / Dr Garcia / Dr Stafford / Dr Anaya / Dr Janelle GALVAN / Dr Barron / Dr Melgoza / Dr Joe martines  _____________________________________________________________________________________________    no events overnight     No Known Allergies    Hospital Medications:   MEDICATIONS  (STANDING):  acetaminophen    Suspension .. 650 milliGRAM(s) Oral every 8 hours  memantine 10 milliGRAM(s) Oral two times a day  meropenem  IVPB 500 milliGRAM(s) IV Intermittent every 12 hours  simvastatin 10 milliGRAM(s) Oral at bedtime        VITALS:  T(F): 98.2 (10-15-19 @ 08:20), Max: 99 (10-14-19 @ 16:13)  HR: 66 (10-15-19 @ 08:20)  BP: 130/55 (10-15-19 @ 08:20)  RR: 17 (10-15-19 @ 08:20)  SpO2: 97% (10-15-19 @ 08:20)  Wt(kg): --    10-14 @ 07:01  -  10-15 @ 07:00  --------------------------------------------------------  IN: 500 mL / OUT: 825 mL / NET: -325 mL        PHYSICAL EXAM:  Constitutional: NAD  HEENT: anicteric sclera, oropharynx clear.  Neck: No JVD  Respiratory: CTAB, no wheezes, rales or rhonchi  Cardiovascular: S1, S2, RRR  Gastrointestinal: BS+, soft, NT/ND  Extremities: No peripheral edema  Neurological: no focal deficits  : No CVA tenderness. No jackson.   Skin: No rashes      LABS:  10-15    151<H>  |  122<H>  |  75<H>  ----------------------------<  108<H>  3.5   |  24  |  1.10    Ca    7.1<L>      15 Oct 2019 08:13  Phos  3.9     10-14  Mg     3.6     10-14    TPro  5.5<L>  /  Alb  2.1<L>  /  TBili  0.5  /  DBili      /  AST  62<H>  /  ALT  41  /  AlkPhos  53  10    Creatinine Trend: 1.10 <--, 1.46 <--, 1.48 <--, 1.64 <--, 1.78 <--, 1.92 <--, 1.79 <--, 2.06 <--                        9.9    6.89  )-----------( 41       ( 15 Oct 2019 08:13 )             33.5     Urine Studies:  Urinalysis Basic - ( 12 Oct 2019 10:20 )    Color: Yellow / Appearance: Clear / S.010 / pH:   Gluc:  / Ketone: Negative  / Bili: Negative / Urobili: Negative   Blood:  / Protein: 30 mg/dL / Nitrite: Negative   Leuk Esterase: Small / RBC: 0-2 /HPF / WBC 6-10 /HPF   Sq Epi:  / Non Sq Epi:  / Bacteria: Trace /HPF      Sodium, Random Urine: 49 mmol/L (10-12 @ 20:26)  Creatinine, Random Urine: 70 mg/dL (10-12 @ 20:26)    RADIOLOGY & ADDITIONAL STUDIES:

## 2019-10-15 NOTE — PROGRESS NOTE ADULT - SUBJECTIVE AND OBJECTIVE BOX
Patient is a 89y old  Female who presents with a chief complaint of respiratory distress, fever (12 Oct 2019 16:04)      HPI:  89 year old female patient, bedbound, non verbal AAO x0 at baseline from [facility], with PMH significant for HTN, DM, HLD and dementia is sent from NH due to fever of 102F, congestion and oxygen saturation of 81%. On arrival, patient was noted to have rectal temperature of 105F, , O2 sat of 88%. She was placed on non-rebreather which improved SPO2 to 95%. In ED, it was noted that G tube was not draining and was erythematous no purulent discharge was noted. G-tube was replaced by ED provider. Her UA was negative, chest Xray showed: new left sided infiltrate at base.  As per charts, patient was being treated with zosyn for osteomyelitis of left foot for last 2 days. However, she continued to spike fever.     History was limited, patient's family was at bedside. Had detailed discussion about GOC, she is DNR/DNI, no pressor support or central line. (12 Oct 2019 12:37)    Seen bedside this AM by podiatry team with attending for left heel ulcer.  Patient was not alert, aware, nor oriented. Patient groans during dressing change.    PMH:Dementia  HTN (hypertension)  Hyperlipidemia    Allergies: No Known Allergies    Medications: albumin human 25% IVPB 50 milliLiter(s) IV Intermittent every 6 hours  dextrose 5%. 1000 milliLiter(s) IV Continuous <Continuous>  heparin  Injectable 5000 Unit(s) SubCutaneous every 12 hours  metroNIDAZOLE  IVPB 500 milliGRAM(s) IV Intermittent every 8 hours    FH:  PSX: No significant past surgical history    SH: albumin human 25% IVPB 50 milliLiter(s) IV Intermittent every 6 hours  dextrose 5%. 1000 milliLiter(s) IV Continuous <Continuous>  heparin  Injectable 5000 Unit(s) SubCutaneous every 12 hours  metroNIDAZOLE  IVPB 500 milliGRAM(s) IV Intermittent every 8 hours        Vital Signs Last 24 Hrs  T(C): 36.8 (15 Oct 2019 08:20), Max: 37.2 (14 Oct 2019 16:13)  T(F): 98.2 (15 Oct 2019 08:20), Max: 99 (14 Oct 2019 16:13)  HR: 66 (15 Oct 2019 08:20) (66 - 70)  BP: 130/55 (15 Oct 2019 08:20) (109/47 - 133/49)  BP(mean): --  RR: 17 (15 Oct 2019 08:20) (17 - 19)  SpO2: 97% (15 Oct 2019 08:20) (95% - 97%)        LABS                        9.9    6.89  )-----------( 41       ( 15 Oct 2019 08:13 )             33.5     10-14    152<H>  |  123<H>  |  97<H>  ----------------------------<  110<H>  4.0   |  24  |  1.46<H>    Ca    7.2<L>      14 Oct 2019 07:14  Phos  3.9     10-14  Mg     3.6     10-14    TPro  5.5<L>  /  Alb  2.1<L>  /  TBili  0.5  /  DBili  x   /  AST  62<H>  /  ALT  41  /  AlkPhos  53  10-14    WBC Count: 6.89 K/uL (10-15 @ 08:13)  WBC Count: 9.46 K/uL (10-14 @ 07:14)  WBC Count: 12.10 K/uL (10-13 @ 06:00)  WBC Count: 12.42 K/uL (10-12 @ 10:03)                    <L< from: Xray Foot AP + Lateral, Left (10.12.19 @ 13:08) >      EXAM:  FOOT 2VIEWS LT                            PROCEDURE DATE:  10/12/2019          INTERPRETATION:  Radiographs of the left foot         CLINICAL INFORMATION:  Concern for osteomyelitis sepsis    TECHNIQUE: Vertebral AP and lateral views of the foot were obtained.    FINDINGS:   No prior similar studies are available for review.    The forefoot demonstrates no evidence of fracture. Normal alignment is   seen. Joint spaces are preserved. Sesamoids are intact.    The midfoot appears intact.    The hindfoot demonstrates no plantar calcaneal spur.    There is soft tissue ulceration seen at the level of the heel. There is   focal sclerosis of the posterior calcaneus and heterogeneous density of   the bony structures diffusely.    IMPRESSION: Diffuse osteopenia and heterogeneity in the density of the   bony structures. There is more focal increased density at the level of   the calcaneus where the soft tissue ulcer is seen. Vasculitis cannot be   excluded and recommend correlation with MR imaging of the foot       EUGENE EMERSON M.D.,ATTENDING RADIOLOGIST  This document has been electronically signed. Oct 12 2019  2:35PM    < end of copied text >    >      12 Oct 2019 14:45    TPro  6.7  /  Alb  1.8<L>  /  TBili  0.4  /  DBili  x   /  AST  55<H>  /  ALT  51  /  AlkPhos  65  10-12      PHYSICAL EXAM    LE Focused:    Vasc:  DP and PT pulses non-palpable b/l, +2 pitting edema noted b/l.  CFT delayed 5 seconds to digits.  Derm: Left heel ulcer measuring 5.0cm x 4.6cm x 0.5cm with red granular base and small area of fibrotic tissue. Probes to bone. No malodor, no purulence, no increase in warmth, minimal periwound erythema.    Neuro: unable to assess  MSK: Patient is in an overall contracted position        Cultures:  taken 10/12: pending  Culture - Surgical Swab (10.12.19 @ 22:28)    -  Gentamicin: S <=2    -  Levofloxacin: S <=2    -  Meropenem: S <=1    -  Piperacillin/Tazobactam: S <=8    -  Tobramycin: S <=2    -  Trimethoprim/Sulfamethoxazole: S <=2/38    -  Amikacin: S <=16    -  Amoxicillin/Clavulanic Acid: S <=8/4    -  Ampicillin: S <=8 These ampicillin results predict results for amoxicillin    -  Ampicillin/Sulbactam: S <=4/2 Enterobacter, Citrobacter, and Serratia may develop resistance during prolonged therapy (3-4 days)    -  Aztreonam: S <=4    -  Cefazolin: S <=2 Enterobacter, Citrobacter, and Serratia may develop resistance during prolonged therapy (3-4 days)    -  Cefepime: S <=2    -  Cefoxitin: S <=8    -  Ceftriaxone: S <=1 Enterobacter, Citrobacter, and Serratia may develop resistance during prolonged therapy    -  Ciprofloxacin: R >2    -  Ertapenem: S <=0.5    Specimen Source: .Surgical Swab    Culture Results:   Few Proteus mirabilis    Organism Identification: Proteus mirabilis    Organism: Proteus mirabilis    Method Type: KENTON        A: Left heel ulcer down to the level of bone with likely underlying osteomyelitis      P:  Patient evaluated, chart reviewed  Xrays reviewed- as above  Culture taken of left heel ulcer 10/12-reviewed  Wound dressed with betadine, 4x4 gauze, Carmen  Recommend continued Heel offloading in bed  Recommend IV antibiotics per primary team or ID  COLLIN/PVR ordered  Consider MRI later next week to further evaluate extent of affected bones pending patient's overall health  Podiatry will follow while in-house  Will consider bedside bone biopsy pending patient's overall health, ID recommendations, and further discussion with the family  Wound care order: betadine, 4x4 gauze, Carmen to heel wound.  Discussed and seen with attending Dr. Maunel

## 2019-10-16 LAB
ANION GAP SERPL CALC-SCNC: 9 MMOL/L — SIGNIFICANT CHANGE UP (ref 5–17)
BUN SERPL-MCNC: 57 MG/DL — HIGH (ref 7–18)
CALCIUM SERPL-MCNC: 7.7 MG/DL — LOW (ref 8.4–10.5)
CHLORIDE SERPL-SCNC: 124 MMOL/L — HIGH (ref 96–108)
CO2 SERPL-SCNC: 23 MMOL/L — SIGNIFICANT CHANGE UP (ref 22–31)
CREAT SERPL-MCNC: 0.94 MG/DL — SIGNIFICANT CHANGE UP (ref 0.5–1.3)
CULTURE RESULTS: SIGNIFICANT CHANGE UP
GLUCOSE SERPL-MCNC: 119 MG/DL — HIGH (ref 70–99)
HCT VFR BLD CALC: 33.8 % — LOW (ref 34.5–45)
HGB BLD-MCNC: 10.1 G/DL — LOW (ref 11.5–15.5)
MCHC RBC-ENTMCNC: 27.7 PG — SIGNIFICANT CHANGE UP (ref 27–34)
MCHC RBC-ENTMCNC: 29.9 GM/DL — LOW (ref 32–36)
MCV RBC AUTO: 92.9 FL — SIGNIFICANT CHANGE UP (ref 80–100)
NRBC # BLD: 0 /100 WBCS — SIGNIFICANT CHANGE UP (ref 0–0)
ORGANISM # SPEC MICROSCOPIC CNT: SIGNIFICANT CHANGE UP
ORGANISM # SPEC MICROSCOPIC CNT: SIGNIFICANT CHANGE UP
PLATELET # BLD AUTO: 44 K/UL — LOW (ref 150–400)
POTASSIUM SERPL-MCNC: 3.4 MMOL/L — LOW (ref 3.5–5.3)
POTASSIUM SERPL-SCNC: 3.4 MMOL/L — LOW (ref 3.5–5.3)
RBC # BLD: 3.64 M/UL — LOW (ref 3.8–5.2)
RBC # FLD: 17.6 % — HIGH (ref 10.3–14.5)
SODIUM SERPL-SCNC: 156 MMOL/L — HIGH (ref 135–145)
SPECIMEN SOURCE: SIGNIFICANT CHANGE UP
WBC # BLD: 7.06 K/UL — SIGNIFICANT CHANGE UP (ref 3.8–10.5)
WBC # FLD AUTO: 7.06 K/UL — SIGNIFICANT CHANGE UP (ref 3.8–10.5)

## 2019-10-16 PROCEDURE — 74018 RADEX ABDOMEN 1 VIEW: CPT | Mod: 26

## 2019-10-16 PROCEDURE — 99232 SBSQ HOSP IP/OBS MODERATE 35: CPT

## 2019-10-16 RX ADMIN — Medication 650 MILLIGRAM(S): at 21:44

## 2019-10-16 RX ADMIN — MEROPENEM 100 MILLIGRAM(S): 1 INJECTION INTRAVENOUS at 17:33

## 2019-10-16 RX ADMIN — SIMVASTATIN 10 MILLIGRAM(S): 20 TABLET, FILM COATED ORAL at 21:44

## 2019-10-16 RX ADMIN — MEMANTINE HYDROCHLORIDE 10 MILLIGRAM(S): 10 TABLET ORAL at 17:33

## 2019-10-16 RX ADMIN — Medication 650 MILLIGRAM(S): at 13:28

## 2019-10-16 RX ADMIN — Medication 650 MILLIGRAM(S): at 06:31

## 2019-10-16 RX ADMIN — Medication 650 MILLIGRAM(S): at 06:30

## 2019-10-16 RX ADMIN — Medication 650 MILLIGRAM(S): at 21:48

## 2019-10-16 RX ADMIN — MEROPENEM 100 MILLIGRAM(S): 1 INJECTION INTRAVENOUS at 06:31

## 2019-10-16 RX ADMIN — MEMANTINE HYDROCHLORIDE 10 MILLIGRAM(S): 10 TABLET ORAL at 06:31

## 2019-10-16 RX ADMIN — Medication 650 MILLIGRAM(S): at 14:56

## 2019-10-16 NOTE — PROGRESS NOTE ADULT - SUBJECTIVE AND OBJECTIVE BOX
HPI:  89 year old female patient, bedbound, non verbal AAO x0 at baseline from [facility], with PMH significant for HTN, DM, HLD and dementia is sent from NH due to fever of 102F, congestion and oxygen saturation of 81%. On arrival, patient was noted to have rectal temperature of 105F, , O2 sat of 88%. She was placed on non-rebreather which improved SPO2 to 95%. In ED, it was noted that G tube was not draining and was erythematous no purulent discharge was noted. G-tube was replaced by ED provider. Her UA was negative, chest Xray showed: new left sided infiltrate at base.  As per charts, patient was being treated with zosyn for osteomyelitis of left foot for last 2 days. However, she continued to spike fever.     History was limited, patient's family was at bedside. Had detailed discussion about GOC, she is DNR/DNI, no pressor support or central line. (12 Oct 2019 12:37)      Patient is a 89y old  Female who presents with a chief complaint of respiratory distress, fever (16 Oct 2019 15:13)      INTERVAL HPI/OVERNIGHT EVENTS:  T(C): 36.8 (10-16-19 @ 07:26), Max: 37.1 (10-16-19 @ 00:36)  HR: 74 (10-16-19 @ 07:26) (71 - 74)  BP: 155/68 (10-16-19 @ 07:26) (144/54 - 155/68)  RR: 16 (10-16-19 @ 07:26) (15 - 16)  SpO2: 96% (10-16-19 @ 07:26) (96% - 97%)  Wt(kg): --  I&O's Summary    15 Oct 2019 07:01  -  16 Oct 2019 07:00  --------------------------------------------------------  IN: 0 mL / OUT: 1300 mL / NET: -1300 mL    16 Oct 2019 07:01  -  16 Oct 2019 16:03  --------------------------------------------------------  IN: 0 mL / OUT: 300 mL / NET: -300 mL        REVIEW OF SYSTEMS: denies fever, chills, SOB, palpitations, chest pain, abdominal pain, nausea, vomitting, diarrhea, constipation, dizziness    MEDICATIONS  (STANDING):  acetaminophen    Suspension .. 650 milliGRAM(s) Oral every 8 hours  memantine 10 milliGRAM(s) Oral two times a day  meropenem  IVPB 500 milliGRAM(s) IV Intermittent every 12 hours  simvastatin 10 milliGRAM(s) Oral at bedtime    MEDICATIONS  (PRN):  morphine  - Injectable 0.5 milliGRAM(s) IV Push every 6 hours PRN Severe Pain (7 - 10)      PHYSICAL EXAM:  GENERAL: NAD, well-groomed, well-developed  HEAD:  Atraumatic, Normocephalic  EYES: EOMI, PERRLA, conjunctiva and sclera clear  ENMT: No tonsillar erythema, exudates, or enlargement; Moist mucous membranes, Good dentition, No lesions  NECK: Supple, No JVD, Normal thyroid  NERVOUS SYSTEM:  Alert & Oriented X3, Good concentration; Motor Strength 5/5 B/L upper and lower extremities; DTRs 2+ intact and symmetric  CHEST/LUNG: Clear to percussion bilaterally; No rales, rhonchi, wheezing, or rubs  HEART: Regular rate and rhythm; No murmurs, rubs, or gallops  ABDOMEN: Soft, Nontender, Nondistended; Bowel sounds present  EXTREMITIES:  2+ Peripheral Pulses, No clubbing, cyanosis, or edema  LYMPH: No lymphadenopathy noted  SKIN: No rashes or lesions  LABS:                        10.1   7.06  )-----------( 44       ( 16 Oct 2019 07:14 )             33.8     10-16    156<H>  |  124<H>  |  57<H>  ----------------------------<  119<H>  3.4<L>   |  23  |  0.94    Ca    7.7<L>      16 Oct 2019 07:14          CAPILLARY BLOOD GLUCOSE

## 2019-10-16 NOTE — PROGRESS NOTE ADULT - SUBJECTIVE AND OBJECTIVE BOX
Patient is a 89y old  Female who presents with a chief complaint of respiratory distress, fever (12 Oct 2019 16:04)      HPI:  89 year old female patient, bedbound, non verbal AAO x0 at baseline from [facility], with PMH significant for HTN, DM, HLD and dementia is sent from NH due to fever of 102F, congestion and oxygen saturation of 81%. On arrival, patient was noted to have rectal temperature of 105F, , O2 sat of 88%. She was placed on non-rebreather which improved SPO2 to 95%. In ED, it was noted that G tube was not draining and was erythematous no purulent discharge was noted. G-tube was replaced by ED provider. Her UA was negative, chest Xray showed: new left sided infiltrate at base.  As per charts, patient was being treated with zosyn for osteomyelitis of left foot for last 2 days. However, she continued to spike fever.     History was limited, patient's family was at bedside. Had detailed discussion about GOC, she is DNR/DNI, no pressor support or central line. (12 Oct 2019 12:37)    Pt. is seen bedside this AM by podiatry team with attending for left heel ulcer. Patient was not alert, aware, nor oriented. Patient groans during dressing change. Offloading device intact for b/l heels.     PMH: Dementia  HTN (hypertension)  Hyperlipidemia    Allergies: No Known Allergies    Medications: albumin human 25% IVPB 50 milliLiter(s) IV Intermittent every 6 hours  dextrose 5%. 1000 milliLiter(s) IV Continuous <Continuous>  heparin  Injectable 5000 Unit(s) SubCutaneous every 12 hours  metroNIDAZOLE  IVPB 500 milliGRAM(s) IV Intermittent every 8 hours    FH:  PSX: No significant past surgical history    SH: albumin human 25% IVPB 50 milliLiter(s) IV Intermittent every 6 hours  dextrose 5%. 1000 milliLiter(s) IV Continuous <Continuous>  heparin  Injectable 5000 Unit(s) SubCutaneous every 12 hours  metroNIDAZOLE  IVPB 500 milliGRAM(s) IV Intermittent every 8 hours        Vital Signs Last 24 Hrs  T(C): 36.8 (16 Oct 2019 07:26), Max: 37.1 (16 Oct 2019 00:36)  T(F): 98.2 (16 Oct 2019 07:26), Max: 98.7 (16 Oct 2019 00:36)  HR: 74 (16 Oct 2019 07:26) (71 - 74)  BP: 155/68 (16 Oct 2019 07:26) (140/62 - 155/68)  BP(mean): --  RR: 16 (16 Oct 2019 07:26) (15 - 16)  SpO2: 96% (16 Oct 2019 07:26) (96% - 97%)        LABS                                   10.1   7.06  )-----------( 44       ( 16 Oct 2019 07:14 )             33.8       10-16    156<H>  |  124<H>  |  57<H>  ----------------------------<  119<H>  3.4<L>   |  23  |  0.94    Ca    7.7<L>      16 Oct 2019 07:14    WBC Count: 7.06 K/uL (10-16 @ 07:14)  WBC Count: 6.89 K/uL (10-15 @ 08:13)  WBC Count: 9.46 K/uL (10-14 @ 07:14)  WBC Count: 12.10 K/uL (10-13 @ 06:00)  WBC Count: 12.42 K/uL (10-12 @ 10:03)                   <L< from: Xray Foot AP + Lateral, Left (10.12.19 @ 13:08) >      EXAM:  FOOT 2VIEWS LT                            PROCEDURE DATE:  10/12/2019          INTERPRETATION:  Radiographs of the left foot         CLINICAL INFORMATION:  Concern for osteomyelitis sepsis    TECHNIQUE: Vertebral AP and lateral views of the foot were obtained.    FINDINGS:   No prior similar studies are available for review.    The forefoot demonstrates no evidence of fracture. Normal alignment is   seen. Joint spaces are preserved. Sesamoids are intact.    The midfoot appears intact.    The hindfoot demonstrates no plantar calcaneal spur.    There is soft tissue ulceration seen at the level of the heel. There is   focal sclerosis of the posterior calcaneus and heterogeneous density of   the bony structures diffusely.    IMPRESSION: Diffuse osteopenia and heterogeneity in the density of the   bony structures. There is more focal increased density at the level of   the calcaneus where the soft tissue ulcer is seen. Vasculitis cannot be   excluded and recommend correlation with MR imaging of the foot       EUGENE EMERSON M.D.,ATTENDING RADIOLOGIST  This document has been electronically signed. Oct 12 2019  2:35PM    < end of copied text >    >      12 Oct 2019 14:45          PHYSICAL EXAM    LE Focused:    Vasc:  DP and PT pulses non-palpable b/l, +2 pitting edema noted b/l.  CFT delayed 5 seconds to digits.  Derm: Left heel ulcer measuring 5.0cm x 4.6cm x 0.5cm with red granular base and small area of fibrotic tissue. Probes to bone. No malodor, no purulence, no increase in warmth, minimal periwound erythema.    Neuro: unable to assess  MSK: Patient is in an overall contracted position        Cultures:  taken 10/12: pending  Culture - Surgical Swab (10.12.19 @ 22:28)    -  Gentamicin: S <=2    -  Levofloxacin: S <=2    -  Meropenem: S <=1    -  Piperacillin/Tazobactam: S <=8    -  Tobramycin: S <=2    -  Trimethoprim/Sulfamethoxazole: S <=2/38    -  Amikacin: S <=16    -  Amoxicillin/Clavulanic Acid: S <=8/4    -  Ampicillin: S <=8 These ampicillin results predict results for amoxicillin    -  Ampicillin/Sulbactam: S <=4/2 Enterobacter, Citrobacter, and Serratia may develop resistance during prolonged therapy (3-4 days)    -  Aztreonam: S <=4    -  Cefazolin: S <=2 Enterobacter, Citrobacter, and Serratia may develop resistance during prolonged therapy (3-4 days)    -  Cefepime: S <=2    -  Cefoxitin: S <=8    -  Ceftriaxone: S <=1 Enterobacter, Citrobacter, and Serratia may develop resistance during prolonged therapy    -  Ciprofloxacin: R >2    -  Ertapenem: S <=0.5    Specimen Source: .Surgical Swab    Culture Results:   Few Proteus mirabilis    Organism Identification: Proteus mirabilis    Organism: Proteus mirabilis    Method Type: KENTON        A: Left heel ulcer down to the level of bone with likely underlying osteomyelitis      P:  Patient evaluated, chart reviewed  Xrays reviewed- as above  Culture taken of left heel ulcer 10/12-reviewed  Wound dressed with betadine, 4x4 gauze, Carmen  Recommend continued Heel offloading in bed  Recommend IV antibiotics per primary team or ID  COLLIN/PVR ordered  Podiatry will follow while in-house  Will consider bedside bone biopsy pending patient's overall health, ID recommendations, and further discussion with the family  Wound care order: betadine, 4x4 gauze, Carmen to heel wound.  Discussed and seen with attending Dr. Mcfadden

## 2019-10-16 NOTE — PROGRESS NOTE ADULT - SUBJECTIVE AND OBJECTIVE BOX
[facility] : Progress Note :: [phone], (office [phone]),   Dr Madrigal / Dr Garcia / Dr Stafford / Dr Anaya / Dr Janelle GALVAN / Dr Barron / Dr Melgoza / Dr Joe martines  _____________________________________________________________________________________________  lethargic. hypernatremia worse    No Known Allergies    Hospital Medications:   MEDICATIONS  (STANDING):  acetaminophen    Suspension .. 650 milliGRAM(s) Oral every 8 hours  memantine 10 milliGRAM(s) Oral two times a day  meropenem  IVPB 500 milliGRAM(s) IV Intermittent every 12 hours  simvastatin 10 milliGRAM(s) Oral at bedtime        VITALS:  T(F): 98.2 (10-16-19 @ 07:26), Max: 98.7 (10-16-19 @ 00:36)  HR: 74 (10-16-19 @ 07:26)  BP: 155/68 (10-16-19 @ 07:26)  RR: 16 (10-16-19 @ 07:26)  SpO2: 96% (10-16-19 @ 07:26)  Wt(kg): --    10-15 @ 07:  -  10-16 @ 07:00  --------------------------------------------------------  IN: 0 mL / OUT: 1300 mL / NET: -1300 mL    10-16 @ 07:01  -  10-16 @ 15:13  --------------------------------------------------------  IN: 0 mL / OUT: 300 mL / NET: -300 mL        PHYSICAL EXAM:  Constitutional: NAD  HEENT: anicteric sclera, oropharynx clear.  Neck: No JVD  Respiratory: CTAB, no wheezes, rales or rhonchi  Cardiovascular: S1, S2, RRR  Gastrointestinal: BS+, soft, NT/ND  Extremities: peripheral edema  Neurological: confused  : jackson.       LABS:  10-16    156<H>  |  124<H>  |  57<H>  ----------------------------<  119<H>  3.4<L>   |  23  |  0.94    Ca    7.7<L>      16 Oct 2019 07:14      Creatinine Trend: 0.94 <--, 1.10 <--, 1.46 <--, 1.48 <--, 1.64 <--, 1.78 <--, 1.92 <--, 1.79 <--, 2.06 <--                        10.1   7.06  )-----------( 44       ( 16 Oct 2019 07:14 )             33.8     Urine Studies:  Urinalysis Basic - ( 12 Oct 2019 10:20 )    Color: Yellow / Appearance: Clear / S.010 / pH:   Gluc:  / Ketone: Negative  / Bili: Negative / Urobili: Negative   Blood:  / Protein: 30 mg/dL / Nitrite: Negative   Leuk Esterase: Small / RBC: 0-2 /HPF / WBC 6-10 /HPF   Sq Epi:  / Non Sq Epi:  / Bacteria: Trace /HPF      Sodium, Random Urine: 49 mmol/L (10-12 @ 20:26)  Creatinine, Random Urine: 70 mg/dL (10-12 @ 20:26)    RADIOLOGY & ADDITIONAL STUDIES:

## 2019-10-16 NOTE — CONSULT NOTE ADULT - REASON FOR ADMISSION
respiratory distress, fever

## 2019-10-16 NOTE — CONSULT NOTE ADULT - CONSULT REASON
Complex decision making related to goals of care
Left heel ulcer
Malfunctioning PEG
pneumonia , fevers, osteomyelitis of left foot
sepsis, thrombocytopenia
Hypernatremia

## 2019-10-16 NOTE — PROGRESS NOTE ADULT - SUBJECTIVE AND OBJECTIVE BOX
no fever  not responding to questions  no bleeding    MEDICATIONS  (STANDING):  acetaminophen    Suspension .. 650 milliGRAM(s) Oral every 8 hours  memantine 10 milliGRAM(s) Oral two times a day  meropenem  IVPB 500 milliGRAM(s) IV Intermittent every 12 hours  simvastatin 10 milliGRAM(s) Oral at bedtime    MEDICATIONS  (PRN):  morphine  - Injectable 0.5 milliGRAM(s) IV Push every 6 hours PRN Severe Pain (7 - 10)      Allergies    No Known Allergies    Intolerances        Vital Signs Last 24 Hrs  T(C): 36.8 (16 Oct 2019 07:26), Max: 37.1 (16 Oct 2019 00:36)  T(F): 98.2 (16 Oct 2019 07:26), Max: 98.7 (16 Oct 2019 00:36)  HR: 74 (16 Oct 2019 07:26) (71 - 74)  BP: 155/68 (16 Oct 2019 07:26) (140/62 - 155/68)  BP(mean): --  RR: 16 (16 Oct 2019 07:26) (15 - 16)  SpO2: 96% (16 Oct 2019 07:26) (96% - 97%)    PHYSICAL EXAM  General: adult in NAD  HEENT: clear oropharynx, anicteric sclera, pink conjunctiva  Neck: supple  CV: normal S1/S2 with no murmur rubs or gallops  Lungs: positive air movement b/l ant lungs,clear to auscultation, no wheezes, no rales  Abdomen: soft non-tender non-distended, no hepatosplenomegaly  Ext: no clubbing cyanosis or edema  Skin: no rashes and no petechiae  Neuro: alert and oriented X 4, no focal deficits  LABS:                          10.1   7.06  )-----------( 44       ( 16 Oct 2019 07:14 )             33.8         Mean Cell Volume : 92.9 fl  Mean Cell Hemoglobin : 27.7 pg  Mean Cell Hemoglobin Concentration : 29.9 gm/dL  Auto Neutrophil # : x  Auto Lymphocyte # : x  Auto Monocyte # : x  Auto Eosinophil # : x  Auto Basophil # : x  Auto Neutrophil % : x  Auto Lymphocyte % : x  Auto Monocyte % : x  Auto Eosinophil % : x  Auto Basophil % : x    Serial CBC  Hematocrit 33.8  Hemoglobin 10.1  Plat 44  RBC 3.64  WBC 7.06  Serial CBC  Hematocrit 33.5  Hemoglobin 9.9  Plat 41  RBC 3.56  WBC 6.89  Serial CBC  Hematocrit 33.7  Hemoglobin 9.6  Plat 41  RBC 3.53  WBC 9.46  Serial CBC  Hematocrit 33.9  Hemoglobin 9.6  Plat 42  RBC 3.39  WBC 12.10    10-16    156<H>  |  124<H>  |  57<H>  ----------------------------<  119<H>  3.4<L>   |  23  |  0.94    Ca    7.7<L>      16 Oct 2019 07:14                      BLOOD SMEAR INTERPRETATION:       RADIOLOGY & ADDITIONAL STUDIES:

## 2019-10-16 NOTE — CONSULT NOTE ADULT - PROBLEM SELECTOR RECOMMENDATION 2
Pending a PEG tube Xray   IF Peg tub eis in correct position please start fee Pending a PEG tube Xray   IF Peg tube is in correct position please start fee

## 2019-10-16 NOTE — PROGRESS NOTE ADULT - SUBJECTIVE AND OBJECTIVE BOX
HPI:  89 year old female patient, bedbound, non verbal AAO x0 at baseline from [facility], with PMH significant for HTN, DM, HLD and dementia is sent from NH due to fever of 102F, congestion and oxygen saturation of 81%.  Pts G tube was found to be malfunctioning upon arrival, replaced in the ED.  Pt admitted for LLL PNA.  Treating with IV antibiotics.  Podiatry doing bedside wound treatments.  ID states OM treatment will increase patients morbidity.  GI to confirm PEG is ok to use.    OVERNIGHT EVENTS:  No issues overnight    REVIEW OF SYSTEMS:  Limited due to patients mental status     CONSTITUTIONAL: No fever,       Vital Signs Last 24 Hrs  T(C): 36.8 (16 Oct 2019 07:26), Max: 37.1 (16 Oct 2019 00:36)  T(F): 98.2 (16 Oct 2019 07:26), Max: 98.7 (16 Oct 2019 00:36)  HR: 74 (16 Oct 2019 07:26) (71 - 74)  BP: 155/68 (16 Oct 2019 07:26) (140/62 - 155/68)  BP(mean): --  RR: 16 (16 Oct 2019 07:26) (15 - 16)  SpO2: 96% (16 Oct 2019 07:26) (96% - 97%)    ________________________________________________  PHYSICAL EXAM:    GENERAL: NAD  HEENT: Normocephalic; conjunctivae and sclerae clear; moist mucous membranes;   NECK : supple, no JVD  CHEST/LUNG: Clear to auscultation bilaterally   HEART: S1 S2  regular  ABDOMEN: Soft, Nontender, Nondistended; Bowel sounds present  EXTREMITIES: no cyanosis; no LE edema; no calf tenderness  SKIN: warm and dry; no rash  NERVOUS SYSTEM:  No new deficits    _________________________________________________  CURRENT MEDICATIONS:    MEDICATIONS  (STANDING):  acetaminophen    Suspension .. 650 milliGRAM(s) Oral every 8 hours  memantine 10 milliGRAM(s) Oral two times a day  meropenem  IVPB 500 milliGRAM(s) IV Intermittent every 12 hours  simvastatin 10 milliGRAM(s) Oral at bedtime    MEDICATIONS  (PRN):  morphine  - Injectable 0.5 milliGRAM(s) IV Push every 6 hours PRN Severe Pain (7 - 10)    __________________________________________________  LABS:                          10.1   7.06  )-----------( 44       ( 16 Oct 2019 07:14 )             33.8     10-16    156<H>  |  124<H>  |  57<H>  ----------------------------<  119<H>  3.4<L>   |  23  |  0.94    Ca    7.7<L>      16 Oct 2019 07:14          CAPILLARY BLOOD GLUCOSE          __________________________________________________  RADIOLOGY & ADDITIONAL TESTS:    Imaging Personally Reviewed:  YES    < from: Xray Chest 1 View-PORTABLE IMMEDIATE (10.12.19 @ 11:02) >  COMPARISON: 1/24/2019    FINDINGS/  IMPRESSION:  Nonspecific prominence right hilum unchanged. Increased interstitial lung   markings. Nonspecific new small densities left lung base. Follow-up   recommended.    Linear atelectasis right lung base. No pleural effusions     Heart size cannot be accurately assessed in this projection, but appear   enlarged.    < end of copied text >    Consultant(s) Notes Reviewed:   YES

## 2019-10-16 NOTE — ADVANCED PRACTICE NURSE CONSULT - RECOMMEDATIONS
-Clean all wounds with normal saline and apply skin prep to the surrounding skin  -Apply Adaptic gauze to the L. Elbow, R. Mid Back, and Sacral wounds and cover with a Foam dressing Q 72hrs PRN  -Elevate/float the patients heels using heel protectors and reposition the patient Q 2hrs using wedges or pillows

## 2019-10-16 NOTE — CONSULT NOTE ADULT - SUBJECTIVE AND OBJECTIVE BOX
Patient is a 89y old  Female who presents with a chief complaint of respiratory distress, fever (16 Oct 2019 14:23)    89 year old female patient, bedbound, non verbal AAO x0 at baseline from [facility], with PMH significant for HTN, DM, HLD and dementia is sent from NH due to fever of 102F, congestion and oxygen saturation of 81%. On arrival, patient was noted to have rectal temperature of 105F, , O2 sat of 88%. She was placed on non-rebreather which improved SPO2 to 95%. In ED, it was noted that G tube was not draining and was erythematous no purulent discharge was noted. G-tube was replaced by ED provider. Her UA was negative, chest Xray showed: new left sided infiltrate at base.  As per charts, patient was being treated with zosyn for osteomyelitis of left foot for last 2 days. However, she continued to spike fever.     REVIEW OF SYSTEMS  Constitutional:   No fever, no fatigue, no pallor, no night sweats, no weight loss.  HEENT:   No eye pain, no vision changes, no icterus, no mouth ulcers.  Respiratory:   No shortness of breath, no cough, no respiratory distress.   Cardiovascular:   No chest pain, no palpitations.   Gastrointestinal: No abdominal pain, no nausea, no vomiting , no diahrrea, no constipation, no hematochezia,no melena.  Skin:   No rashes, no jaundice, no eczema.   Musculoskeletal:   No joint pain, no swelling, no myalgia.   Neurologic:   No headache, no seizure, no weakness.   Genitourinary:   No dysuria, no decreased urine output.  Psychiatric:  No depression, no anxiety,   Endocrine:   No thyroid disease, no diabetes.  Heme/Lymphatic:   No anemia, no blood transfusions, no lymph node enlargement, no bleeding, no bruising.  ___________________________________________________________________________________________  Allergies    No Known Allergies    Intolerances      MEDICATIONS  (STANDING):  acetaminophen    Suspension .. 650 milliGRAM(s) Oral every 8 hours  memantine 10 milliGRAM(s) Oral two times a day  meropenem  IVPB 500 milliGRAM(s) IV Intermittent every 12 hours  simvastatin 10 milliGRAM(s) Oral at bedtime    MEDICATIONS  (PRN):  morphine  - Injectable 0.5 milliGRAM(s) IV Push every 6 hours PRN Severe Pain (7 - 10)      PAST MEDICAL & SURGICAL HISTORY:  Dementia  HTN (hypertension)  Hyperlipidemia  No significant past surgical history    FAMILY HISTORY:    Social History: No hsitory of : Tobacco use, IVDA, EToH  ______________________________________________________________________________________    PHYSICAL EXAM    Daily     Daily   BMI: 24.3 (10-12 @ 09:48)  Change in Weight:  Vital Signs Last 24 Hrs  T(C): 36.8 (16 Oct 2019 07:26), Max: 37.1 (16 Oct 2019 00:36)  T(F): 98.2 (16 Oct 2019 07:26), Max: 98.7 (16 Oct 2019 00:36)  HR: 74 (16 Oct 2019 07:26) (71 - 74)  BP: 155/68 (16 Oct 2019 07:26) (140/62 - 155/68)  BP(mean): --  RR: 16 (16 Oct 2019 07:26) (15 - 16)  SpO2: 96% (16 Oct 2019 07:26) (96% - 97%)    General:  Well developed, well nourished, alert and active, no pallor, NAD.  HEENT:    Normal appearance of conjunctiva, ears, nose, lips, oropharynx, and oral mucosa, anicteric.  Neck:  No masses, no asymmetry.  Lymph Nodes:  No lymphadenopathy.   Cardiovascular:  RRR normal S1/S2, no murmur.  Respiratory:  CTA B/L, normal respiratory effort.   Abdominal:   soft, no masses or tenderness, normoactive BS, NT/ND, no HSM. PEG flushed by me. Working well   Extremities:   No clubbing or cyanosis, normal capillary refill, no edema.   Skin:   No rash, jaundice, lesions, eczema.   Musculoskeletal:  No joint swelling, erythema or tenderness.   Neuro: No focal deficits.   Other:   _______________________________________________________________________________________________  Lab Results:                          10.1   7.06  )-----------( 44       ( 16 Oct 2019 07:14 )             33.8     10-16    156<H>  |  124<H>  |  57<H>  ----------------------------<  119<H>  3.4<L>   |  23  |  0.94    Ca    7.7<L>      16 Oct 2019 07:14                Stool Results:          RADIOLOGY RESULTS:    SURGICAL PATHOLOGY:

## 2019-10-16 NOTE — ADVANCED PRACTICE NURSE CONSULT - ASSESSMENT
This is a 89yr old female patient admitted for Sepsis, presenting with the following:  -There is a Stage 1 Pressure Injury to the R. Ankle, as evident by non-blanchable erythema  -There is a Stage 4 Pressure Injury to the L. Heel, to which the patient is being followed by Podiatry with a treatment plan in place to address this issue  -There is a DTI to the L. Elbow (1cm x 0.2cm x 0.1cm), R. Mid back (0.4cm x 1cm x 0.1cm), and Sacrum (1cm x 0.2cm x 0.1cm) with epidermal separation, red tissue, and scant drainage  -It is to be noted that the patient is exhibiting signs of skin failure  -It is to be noted that the patient is being followed by Palliative Care  -The patient is being maintained on a low air loss bed with pressure injury prevention resources in place

## 2019-10-16 NOTE — CONSULT NOTE ADULT - ASSESSMENT
89 year old female with malfunctioning PEG
89 year old lady was admitted for fever up to 105 and hypoxia.  platelet was 88 at admission, dropped to 41.
89y Female NH resident, bedbound and non verbal at baseline with PMH significant for HTN, DM, HLD and dementia a/w sepsis 2/2 Left heel OM v PNA, in setting of GAYLE, hypernatremia.
89 year old woman with SOB, Functional quadriplegia, protein calorie malnutrition,  PNA, Dementia, advance care planning and encounter for palliative care.
Pneumonia   Left foot osteomyelitis  Fevers  Leukocytosis    Plan - cont meropenem 500mgs iv q12hrs  spoke with daughter that I most likely will not treat her osteo given her age as she is more likely to suffer morbity from abxs rather than her osteo given her age and that she is bedbound.

## 2019-10-16 NOTE — PROGRESS NOTE ADULT - PROBLEM SELECTOR PLAN 9
Patient's daughter Ms. Anya Elkins, [phone], [phone], wants her mother to be comfortable.  Pt is DNR/DNI

## 2019-10-17 ENCOUNTER — TRANSCRIPTION ENCOUNTER (OUTPATIENT)
Age: 84
End: 2019-10-17

## 2019-10-17 DIAGNOSIS — E87.6 HYPOKALEMIA: ICD-10-CM

## 2019-10-17 DIAGNOSIS — Z71.89 OTHER SPECIFIED COUNSELING: ICD-10-CM

## 2019-10-17 DIAGNOSIS — Z02.9 ENCOUNTER FOR ADMINISTRATIVE EXAMINATIONS, UNSPECIFIED: ICD-10-CM

## 2019-10-17 DIAGNOSIS — R52 PAIN, UNSPECIFIED: ICD-10-CM

## 2019-10-17 DIAGNOSIS — M86.9 OSTEOMYELITIS, UNSPECIFIED: ICD-10-CM

## 2019-10-17 DIAGNOSIS — J18.1 LOBAR PNEUMONIA, UNSPECIFIED ORGANISM: ICD-10-CM

## 2019-10-17 LAB
ANION GAP SERPL CALC-SCNC: 6 MMOL/L — SIGNIFICANT CHANGE UP (ref 5–17)
BUN SERPL-MCNC: 44 MG/DL — HIGH (ref 7–18)
CALCIUM SERPL-MCNC: 7.6 MG/DL — LOW (ref 8.4–10.5)
CHLORIDE SERPL-SCNC: 124 MMOL/L — HIGH (ref 96–108)
CO2 SERPL-SCNC: 25 MMOL/L — SIGNIFICANT CHANGE UP (ref 22–31)
CREAT SERPL-MCNC: 0.77 MG/DL — SIGNIFICANT CHANGE UP (ref 0.5–1.3)
CULTURE RESULTS: SIGNIFICANT CHANGE UP
CULTURE RESULTS: SIGNIFICANT CHANGE UP
GLUCOSE SERPL-MCNC: 115 MG/DL — HIGH (ref 70–99)
HCT VFR BLD CALC: 33.4 % — LOW (ref 34.5–45)
HGB BLD-MCNC: 10.2 G/DL — LOW (ref 11.5–15.5)
MCHC RBC-ENTMCNC: 28.3 PG — SIGNIFICANT CHANGE UP (ref 27–34)
MCHC RBC-ENTMCNC: 30.5 GM/DL — LOW (ref 32–36)
MCV RBC AUTO: 92.8 FL — SIGNIFICANT CHANGE UP (ref 80–100)
NRBC # BLD: 0 /100 WBCS — SIGNIFICANT CHANGE UP (ref 0–0)
PLATELET # BLD AUTO: 50 K/UL — LOW (ref 150–400)
POTASSIUM SERPL-MCNC: 3.2 MMOL/L — LOW (ref 3.5–5.3)
POTASSIUM SERPL-SCNC: 3.2 MMOL/L — LOW (ref 3.5–5.3)
RBC # BLD: 3.6 M/UL — LOW (ref 3.8–5.2)
RBC # FLD: 17.2 % — HIGH (ref 10.3–14.5)
SODIUM SERPL-SCNC: 155 MMOL/L — HIGH (ref 135–145)
SPECIMEN SOURCE: SIGNIFICANT CHANGE UP
SPECIMEN SOURCE: SIGNIFICANT CHANGE UP
WBC # BLD: 8.29 K/UL — SIGNIFICANT CHANGE UP (ref 3.8–10.5)
WBC # FLD AUTO: 8.29 K/UL — SIGNIFICANT CHANGE UP (ref 3.8–10.5)

## 2019-10-17 PROCEDURE — 99233 SBSQ HOSP IP/OBS HIGH 50: CPT

## 2019-10-17 RX ORDER — SODIUM CHLORIDE 0.65 %
1 AEROSOL, SPRAY (ML) NASAL EVERY 8 HOURS
Refills: 0 | Status: DISCONTINUED | OUTPATIENT
Start: 2019-10-17 | End: 2019-10-23

## 2019-10-17 RX ORDER — HEPARIN SODIUM 5000 [USP'U]/ML
5000 INJECTION INTRAVENOUS; SUBCUTANEOUS EVERY 12 HOURS
Refills: 0 | Status: DISCONTINUED | OUTPATIENT
Start: 2019-10-17 | End: 2019-10-23

## 2019-10-17 RX ORDER — POTASSIUM CHLORIDE 20 MEQ
10 PACKET (EA) ORAL
Refills: 0 | Status: COMPLETED | OUTPATIENT
Start: 2019-10-17 | End: 2019-10-17

## 2019-10-17 RX ORDER — SODIUM CHLORIDE 9 MG/ML
1000 INJECTION, SOLUTION INTRAVENOUS
Refills: 0 | Status: DISCONTINUED | OUTPATIENT
Start: 2019-10-17 | End: 2019-10-23

## 2019-10-17 RX ADMIN — Medication 650 MILLIGRAM(S): at 06:54

## 2019-10-17 RX ADMIN — SODIUM CHLORIDE 60 MILLILITER(S): 9 INJECTION, SOLUTION INTRAVENOUS at 13:32

## 2019-10-17 RX ADMIN — Medication 650 MILLIGRAM(S): at 22:45

## 2019-10-17 RX ADMIN — Medication 100 MILLIEQUIVALENT(S): at 10:10

## 2019-10-17 RX ADMIN — MEROPENEM 100 MILLIGRAM(S): 1 INJECTION INTRAVENOUS at 17:27

## 2019-10-17 RX ADMIN — Medication 1 SPRAY(S): at 13:33

## 2019-10-17 RX ADMIN — Medication 1 SPRAY(S): at 21:56

## 2019-10-17 RX ADMIN — SODIUM CHLORIDE 60 MILLILITER(S): 9 INJECTION, SOLUTION INTRAVENOUS at 21:57

## 2019-10-17 RX ADMIN — Medication 650 MILLIGRAM(S): at 06:53

## 2019-10-17 RX ADMIN — MEMANTINE HYDROCHLORIDE 10 MILLIGRAM(S): 10 TABLET ORAL at 17:27

## 2019-10-17 RX ADMIN — MEMANTINE HYDROCHLORIDE 10 MILLIGRAM(S): 10 TABLET ORAL at 06:53

## 2019-10-17 RX ADMIN — HEPARIN SODIUM 5000 UNIT(S): 5000 INJECTION INTRAVENOUS; SUBCUTANEOUS at 17:26

## 2019-10-17 RX ADMIN — Medication 650 MILLIGRAM(S): at 14:40

## 2019-10-17 RX ADMIN — Medication 650 MILLIGRAM(S): at 21:58

## 2019-10-17 RX ADMIN — Medication 650 MILLIGRAM(S): at 13:35

## 2019-10-17 RX ADMIN — SIMVASTATIN 10 MILLIGRAM(S): 20 TABLET, FILM COATED ORAL at 21:56

## 2019-10-17 RX ADMIN — MEROPENEM 100 MILLIGRAM(S): 1 INJECTION INTRAVENOUS at 06:53

## 2019-10-17 RX ADMIN — Medication 100 MILLIEQUIVALENT(S): at 11:23

## 2019-10-17 NOTE — PROGRESS NOTE ADULT - SUBJECTIVE AND OBJECTIVE BOX
OVERNIGHT EVENTS: no overnight events    Present Symptoms:      Review of Systems:   Unable to obtain due to poor mentation     MEDICATIONS  (STANDING):  acetaminophen    Suspension .. 650 milliGRAM(s) Oral every 8 hours  dextrose 5%. 1000 milliLiter(s) (60 mL/Hr) IV Continuous <Continuous>  heparin  Injectable 5000 Unit(s) SubCutaneous every 12 hours  memantine 10 milliGRAM(s) Oral two times a day  meropenem  IVPB 500 milliGRAM(s) IV Intermittent every 12 hours  simvastatin 10 milliGRAM(s) Oral at bedtime  sodium chloride 0.65% Nasal 1 Spray(s) Both Nostrils every 8 hours    MEDICATIONS  (PRN):      PHYSICAL EXAM:  Vital Signs Last 24 Hrs  T(C): 36.7 (17 Oct 2019 15:52), Max: 37.5 (16 Oct 2019 23:48)  T(F): 98.1 (17 Oct 2019 15:52), Max: 99.5 (16 Oct 2019 23:48)  HR: 82 (17 Oct 2019 15:52) (72 - 82)  BP: 135/68 (17 Oct 2019 15:52) (116/63 - 141/58)  BP(mean): --  RR: 18 (17 Oct 2019 15:52) (15 - 18)  SpO2: 96% (17 Oct 2019 15:52) (95% - 97%)     Karnofsky Performance Score/Palliative Performance Status Version2:  30  %  General: intermittently grunting, frail, nonverbal, NAD  HEENT: NC, sclera anicteric, neck supple  Lungs: unlabored  CV: S1, S2  GI: soft, NT, ND, PEG in place   Musculoskeletal: no edema, foot w dressing in place  Skin: Stage IV L heel, DTI elbow  Neuro:  lethargic, nonverbal  Oral intake:  TF       LABS:                          10.2   8.29  )-----------( 50       ( 17 Oct 2019 07:52 )             33.4     10-17    155<H>  |  124<H>  |  44<H>  ----------------------------<  115<H>  3.2<L>   |  25  |  0.77    Ca    7.6<L>      17 Oct 2019 07:52          RADIOLOGY & ADDITIONAL STUDIES:    ADVANCE DIRECTIVES: MOLST for DNR/DNI

## 2019-10-17 NOTE — PROGRESS NOTE ADULT - SUBJECTIVE AND OBJECTIVE BOX
89y Female    Meds:  meropenem  IVPB 500 milliGRAM(s) IV Intermittent every 12 hours    Allergies    No Known Allergies    Intolerances        VITALS:  Vital Signs Last 24 Hrs  T(C): 36.7 (17 Oct 2019 15:52), Max: 37.5 (16 Oct 2019 23:48)  T(F): 98.1 (17 Oct 2019 15:52), Max: 99.5 (16 Oct 2019 23:48)  HR: 82 (17 Oct 2019 15:52) (72 - 82)  BP: 135/68 (17 Oct 2019 15:52) (116/63 - 141/58)  BP(mean): --  RR: 18 (17 Oct 2019 15:52) (15 - 18)  SpO2: 96% (17 Oct 2019 15:52) (95% - 97%)    LABS/DIAGNOSTIC TESTS:                          10.2   8.29  )-----------( 50       ( 17 Oct 2019 07:52 )             33.4         10-17    155<H>  |  124<H>  |  44<H>  ----------------------------<  115<H>  3.2<L>   |  25  |  0.77    Ca    7.6<L>      17 Oct 2019 07:52            CULTURES: .Surgical Swab  10-12 @ 22:28   Few Proteus mirabilis  Rare Parabacteroides distasonis "Susceptibilities not performed"  Rare Bacteroides fragilis Group "Susceptibilities not performed"  --  Proteus mirabilis      .Blood  10-12 @ 16:52   No growth at 5 days.  --  --      .Urine  10-12 @ 16:31   No growth  --  --            RADIOLOGY:      ROS:  [  ] UNABLE TO ELICIT 89y Female who is in stable condition, she does not appear to be in any respiratory distress, she is not talking and unable to verbalize any complaints. She has no fevers or diarrhea. Her WBC count is WNL. Her doppler shows that she has poor arterial circulation and just another reason why I would not treat her for osteomyelitis as she would not heal irregardless.    Meds:  meropenem  IVPB 500 milliGRAM(s) IV Intermittent every 12 hours    Allergies    No Known Allergies    Intolerances        VITALS:  Vital Signs Last 24 Hrs  T(C): 36.7 (17 Oct 2019 15:52), Max: 37.5 (16 Oct 2019 23:48)  T(F): 98.1 (17 Oct 2019 15:52), Max: 99.5 (16 Oct 2019 23:48)  HR: 82 (17 Oct 2019 15:52) (72 - 82)  BP: 135/68 (17 Oct 2019 15:52) (116/63 - 141/58)  BP(mean): --  RR: 18 (17 Oct 2019 15:52) (15 - 18)  SpO2: 96% (17 Oct 2019 15:52) (95% - 97%)    LABS/DIAGNOSTIC TESTS:                          10.2   8.29  )-----------( 50       ( 17 Oct 2019 07:52 )             33.4         10-17    155<H>  |  124<H>  |  44<H>  ----------------------------<  115<H>  3.2<L>   |  25  |  0.77    Ca    7.6<L>      17 Oct 2019 07:52            CULTURES: .Surgical Swab  10-12 @ 22:28   Few Proteus mirabilis  Rare Parabacteroides distasonis "Susceptibilities not performed"  Rare Bacteroides fragilis Group "Susceptibilities not performed"  --  Proteus mirabilis      .Blood  10-12 @ 16:52   No growth at 5 days.  --  --      .Urine  10-12 @ 16:31   No growth  --  --            RADIOLOGY:      ROS:  [ x ] UNABLE TO ELICIT

## 2019-10-17 NOTE — DISCHARGE NOTE PROVIDER - NSDCCPCAREPLAN_GEN_ALL_CORE_FT
PRINCIPAL DISCHARGE DIAGNOSIS  Diagnosis: Left lower lobe pneumonia  Assessment and Plan of Treatment: Pneumonia is a lung infection that can cause a fever, cough, and trouble breathing.  Continue all antibiotics as ordered until complete.  Nutrition is important, eat small frequent meals.  Get lots of rest and drink fluids.  Call your health care provider upon arrival home from hospital and make a follow up appointment for one week.  If your cough worsens, you develop fever greater than 101', you have shaking chills, a fast heartbeat, trouble breathing and/or feel your are breathing much faster than usual, call your healthcare provider.  Make sure you wash your hands frequently.        SECONDARY DISCHARGE DIAGNOSES  Diagnosis: Hypernatremia  Assessment and Plan of Treatment: your sodium level is high when you came in  improving on IVF   continue water via PEG tube    Diagnosis: Encounter for palliative care  Assessment and Plan of Treatment: you are eligible for hospice care. hospice care team is following your care    Diagnosis: Osteomyelitis  Assessment and Plan of Treatment: this is bone infection  due to her age and comorbity, Antibiotic treatment won't be beneficial  continue local wound care    Diagnosis: GAYLE (acute kidney injury)  Assessment and Plan of Treatment: you came in with severe GAYLE   Improving now on IVF   Avoid taking (NSAIDs) - (ex: Ibuprofen, Advil, Celebrex, Naprosyn)  Avoid taking any nephrotoxic agents (can harm kidneys) - Intravenous contrast for diagnostic testing, combination cold medications.  Have all medications adjusted for your renal function by your Health Care Provider.  Blood pressure control is important.  Take all medication as prescribed.

## 2019-10-17 NOTE — PROGRESS NOTE ADULT - PROBLEM SELECTOR PLAN 9
DNR /DNI on [facility]  palliative care team is following - had a meeitng with donny Ms. smart and son Mr Rosa -- pt is hospice eligible, referral made as both of them agreed on hospice care DNR /DNI on [facility]  palliative care team is following - had a meeting with daughter Ms. smart and son Mr Rosa -- pt is hospice eligible, referral made as both of them agreed on hospice care

## 2019-10-17 NOTE — DISCHARGE NOTE PROVIDER - HOSPITAL COURSE
89 year old female patient, bedbound, non verbal AAO x0 at baseline from [facility], with PMH significant for HTN, DM, HLD and dementia is sent from NH due to fever of 102F, congestion and oxygen saturation of 81%.  Pts G tube was found to be malfunctioning upon arrival, replaced in the ED.  Pt admitted for LLL PNA.  Treating with IV antibiotics. Well responding on current treatment for pneumonia,  WBC WNL, afebrile, breathing well on O2 supplement. However, b/l foot infection which is suspicious for OM, pt's daughter had a discussion with KENYA Rick the other day and had a discussion with Dr. Segovia from palliative care team on 10.17. Pt is hospice eligible, decided not to treat for Osteomyelitis, but treating pneumonia now then focusing to make pt comfortable as possible. Daughter Anya and  Son Moe agreed on hospice care, family chose [facility] with florencia as their first choice.  Podiatry team updated that hospice care referral made, no bone biopsy for wound, but will continue to provide loca wound care. PEG tube feeding resumed and functioning. Referral made to [facility]. 89 year old female patient, bedbound, non verbal AAO x0 at baseline from [facility], with PMH significant for HTN, DM, HLD and dementia is sent from NH due to fever of 102F, congestion and oxygen saturation of 81%.  Pts G tube was found to be malfunctioning upon arrival, replaced in the ED.  Pt admitted for LLL PNA.  Treating with IV antibiotics. Well responding on current treatment for pneumonia,  WBC WNL, afebrile, breathing well on O2 supplement. However, b/l foot infection which is suspicious for OM, pt's daughter had a discussion with KENYA Rick the other day and had a discussion with Dr. Segovia from palliative care team on 10.17. Pt is hospice eligible, decided not to treat for Osteomyelitis, but treating pneumonia now then focusing to make pt comfortable as possible. Daughter Anya and  Son Moe agreed on hospice care, family chose [facility] with florencia as their first choice.  Podiatry team updated that hospice care referral made, no bone biopsy for wound, but will continue to provide loca wound care. PEG tube feeding resumed and functioning.  pt medically stable for discharge to residential hospice

## 2019-10-17 NOTE — PROGRESS NOTE ADULT - SUBJECTIVE AND OBJECTIVE BOX
Patient is a 89y old  Female who presents with a chief complaint of respiratory distress, fever (12 Oct 2019 16:04)      HPI:  89 year old female patient, bedbound, non verbal AAO x0 at baseline from [facility], with PMH significant for HTN, DM, HLD and dementia is sent from NH due to fever of 102F, congestion and oxygen saturation of 81%. On arrival, patient was noted to have rectal temperature of 105F, , O2 sat of 88%. She was placed on non-rebreather which improved SPO2 to 95%. In ED, it was noted that G tube was not draining and was erythematous no purulent discharge was noted. G-tube was replaced by ED provider. Her UA was negative, chest Xray showed: new left sided infiltrate at base.  As per charts, patient was being treated with zosyn for osteomyelitis of left foot for last 2 days. However, she continued to spike fever.     History was limited, patient's family was at bedside. Had detailed discussion about GOC, she is DNR/DNI, no pressor support or central line. (12 Oct 2019 12:37)    Pt. is seen bedside this PM by podiatry team. Patient was not alert, aware, nor oriented. Patient groans during dressing change. Dressing clean and intact. Offloading device intact for b/l heels.     PMH: Dementia  HTN (hypertension)  Hyperlipidemia    Allergies: No Known Allergies    Medications: albumin human 25% IVPB 50 milliLiter(s) IV Intermittent every 6 hours  dextrose 5%. 1000 milliLiter(s) IV Continuous <Continuous>  heparin  Injectable 5000 Unit(s) SubCutaneous every 12 hours  metroNIDAZOLE  IVPB 500 milliGRAM(s) IV Intermittent every 8 hours    FH:  PSX: No significant past surgical history    SH: albumin human 25% IVPB 50 milliLiter(s) IV Intermittent every 6 hours  dextrose 5%. 1000 milliLiter(s) IV Continuous <Continuous>  heparin  Injectable 5000 Unit(s) SubCutaneous every 12 hours  metroNIDAZOLE  IVPB 500 milliGRAM(s) IV Intermittent every 8 hours        Vital Signs Last 24 Hrs  T(C): 36.4 (17 Oct 2019 07:58), Max: 37.5 (16 Oct 2019 23:48)  T(F): 97.6 (17 Oct 2019 07:58), Max: 99.5 (16 Oct 2019 23:48)  HR: 72 (17 Oct 2019 07:58) (72 - 80)  BP: 116/63 (17 Oct 2019 07:58) (116/63 - 163/73)  BP(mean): --  RR: 18 (17 Oct 2019 07:58) (15 - 18)  SpO2: 97% (17 Oct 2019 07:58) (95% - 97%)        LABS                                   10.2   8.29  )-----------( 50       ( 17 Oct 2019 07:52 )             33.4     10-17    155<H>  |  124<H>  |  44<H>  ----------------------------<  115<H>  3.2<L>   |  25  |  0.77    Ca    7.6<L>      17 Oct 2019 07:52               WBC Count: 8.29 K/uL (10-17 @ 07:52)  WBC Count: 7.06 K/uL (10-16 @ 07:14)  WBC Count: 6.89 K/uL (10-15 @ 08:13)  WBC Count: 9.46 K/uL (10-14 @ 07:14)  WBC Count: 12.10 K/uL (10-13 @ 06:00)                  <L< from: Xray Foot AP + Lateral, Left (10.12.19 @ 13:08) >      EXAM:  FOOT 2VIEWS LT                            PROCEDURE DATE:  10/12/2019          INTERPRETATION:  Radiographs of the left foot         CLINICAL INFORMATION:  Concern for osteomyelitis sepsis    TECHNIQUE: Vertebral AP and lateral views of the foot were obtained.    FINDINGS:   No prior similar studies are available for review.    The forefoot demonstrates no evidence of fracture. Normal alignment is   seen. Joint spaces are preserved. Sesamoids are intact.    The midfoot appears intact.    The hindfoot demonstrates no plantar calcaneal spur.    There is soft tissue ulceration seen at the level of the heel. There is   focal sclerosis of the posterior calcaneus and heterogeneous density of   the bony structures diffusely.    IMPRESSION: Diffuse osteopenia and heterogeneity in the density of the   bony structures. There is more focal increased density at the level of   the calcaneus where the soft tissue ulcer is seen. Vasculitis cannot be   excluded and recommend correlation with MR imaging of the foot       EUGENE EMERSON M.D.,ATTENDING RADIOLOGIST  This document has been electronically signed. Oct 12 2019  2:35PM    < end of copied text >    >      12 Oct 2019 14:45          PHYSICAL EXAM    LE Focused:    Vasc:  DP and PT pulses non-palpable b/l, +2 pitting edema noted b/l.  CFT delayed 5 seconds to digits.  Derm: Left heel ulcer measuring 5.0cm x 4.6cm x 0.5cm with red granular base and small area of fibrotic tissue. Probes to bone. No malodor, no purulence, no increase in warmth, minimal periwound erythema.    Neuro: unable to assess  MSK: Patient is in an overall contracted position        Cultures:  taken 10/12: pending  Culture - Surgical Swab (10.12.19 @ 22:28)    -  Gentamicin: S <=2    -  Levofloxacin: S <=2    -  Meropenem: S <=1    -  Piperacillin/Tazobactam: S <=8    -  Tobramycin: S <=2    -  Trimethoprim/Sulfamethoxazole: S <=2/38    -  Amikacin: S <=16    -  Amoxicillin/Clavulanic Acid: S <=8/4    -  Ampicillin: S <=8 These ampicillin results predict results for amoxicillin    -  Ampicillin/Sulbactam: S <=4/2 Enterobacter, Citrobacter, and Serratia may develop resistance during prolonged therapy (3-4 days)    -  Aztreonam: S <=4    -  Cefazolin: S <=2 Enterobacter, Citrobacter, and Serratia may develop resistance during prolonged therapy (3-4 days)    -  Cefepime: S <=2    -  Cefoxitin: S <=8    -  Ceftriaxone: S <=1 Enterobacter, Citrobacter, and Serratia may develop resistance during prolonged therapy    -  Ciprofloxacin: R >2    -  Ertapenem: S <=0.5    Specimen Source: .Surgical Swab    Culture Results:   Few Proteus mirabilis    Organism Identification: Proteus mirabilis    Organism: Proteus mirabilis    Method Type: KENTON        A: Left heel ulcer down to the level of bone with likely underlying osteomyelitis      P:  Patient evaluated, chart reviewed  Xrays reviewed- as above  Culture taken of left heel ulcer 10/12-reviewed  Wound dressed with betadine, 4x4 gauze, Carmen  Recommend continued Heel offloading in bed  Recommend IV antibiotics per primary team or ID  COLLIN/PVR ordered  Podiatry will follow while in-house with local wound care.   Wound care order: betadine, 4x4 gauze, Carmen to heel wound.  Discussed with attending Dr. Mcfadden

## 2019-10-17 NOTE — PROGRESS NOTE ADULT - SUBJECTIVE AND OBJECTIVE BOX
HPI:  89 year old female patient, bedbound, non verbal AAO x0 at baseline from [facility], with PMH significant for HTN, DM, HLD and dementia is sent from NH due to fever of 102F, congestion and oxygen saturation of 81%. On arrival, patient was noted to have rectal temperature of 105F, , O2 sat of 88%. She was placed on non-rebreather which improved SPO2 to 95%. In ED, it was noted that G tube was not draining and was erythematous no purulent discharge was noted. G-tube was replaced by ED provider. Her UA was negative, chest Xray showed: new left sided infiltrate at base.  As per charts, patient was being treated with zosyn for osteomyelitis of left foot for last 2 days. However, she continued to spike fever.     History was limited, patient's family was at bedside. Had detailed discussion about GOC, she is DNR/DNI, no pressor support or central line. (12 Oct 2019 12:37)      Patient is a 89y old  Female who presents with a chief complaint of respiratory distress, fever (16 Oct 2019 16:02)      INTERVAL HPI/OVERNIGHT EVENTS:  T(C): 36.4 (10-17-19 @ 07:58), Max: 37.5 (10-16-19 @ 23:48)  HR: 72 (10-17-19 @ 07:58) (72 - 80)  BP: 116/63 (10-17-19 @ 07:58) (116/63 - 163/73)  RR: 18 (10-17-19 @ 07:58) (15 - 18)  SpO2: 97% (10-17-19 @ 07:58) (95% - 97%)  Wt(kg): --  I&O's Summary    16 Oct 2019 07:01  -  17 Oct 2019 07:00  --------------------------------------------------------  IN: 0 mL / OUT: 970 mL / NET: -970 mL        REVIEW OF SYSTEMS: denies fever, chills, SOB, palpitations, chest pain, abdominal pain, nausea, vomitting, diarrhea, constipation, dizziness    MEDICATIONS  (STANDING):  acetaminophen    Suspension .. 650 milliGRAM(s) Oral every 8 hours  memantine 10 milliGRAM(s) Oral two times a day  meropenem  IVPB 500 milliGRAM(s) IV Intermittent every 12 hours  simvastatin 10 milliGRAM(s) Oral at bedtime  sodium chloride 0.65% Nasal 1 Spray(s) Both Nostrils every 8 hours    MEDICATIONS  (PRN):      PHYSICAL EXAM:  GENERAL: NAD, well-groomed, well-developed  HEAD:  Atraumatic, Normocephalic  EYES: EOMI, PERRLA, conjunctiva and sclera clear  ENMT: No tonsillar erythema, exudates, or enlargement; Moist mucous membranes, Good dentition, No lesions  NECK: Supple, No JVD, Normal thyroid  NERVOUS SYSTEM:  Alert & Oriented X3, Good concentration; Motor Strength 5/5 B/L upper and lower extremities; DTRs 2+ intact and symmetric  CHEST/LUNG: Clear to percussion bilaterally; No rales, rhonchi, wheezing, or rubs  HEART: Regular rate and rhythm; No murmurs, rubs, or gallops  ABDOMEN: Soft, Nontender, Nondistended; Bowel sounds present  EXTREMITIES:  2+ Peripheral Pulses, No clubbing, cyanosis, or edema  LYMPH: No lymphadenopathy noted  SKIN: No rashes or lesions  LABS:                        10.2   8.29  )-----------( 50       ( 17 Oct 2019 07:52 )             33.4     10-17    155<H>  |  124<H>  |  44<H>  ----------------------------<  115<H>  3.2<L>   |  25  |  0.77    Ca    7.6<L>      17 Oct 2019 07:52          CAPILLARY BLOOD GLUCOSE

## 2019-10-17 NOTE — PROGRESS NOTE ADULT - SUBJECTIVE AND OBJECTIVE BOX
condition same  no fever  no bleeding  'not answering questions    MEDICATIONS  (STANDING):  acetaminophen    Suspension .. 650 milliGRAM(s) Oral every 8 hours  dextrose 5%. 1000 milliLiter(s) (60 mL/Hr) IV Continuous <Continuous>  heparin  Injectable 5000 Unit(s) SubCutaneous every 12 hours  memantine 10 milliGRAM(s) Oral two times a day  meropenem  IVPB 500 milliGRAM(s) IV Intermittent every 12 hours  simvastatin 10 milliGRAM(s) Oral at bedtime  sodium chloride 0.65% Nasal 1 Spray(s) Both Nostrils every 8 hours    MEDICATIONS  (PRN):      Allergies    No Known Allergies    Intolerances        Vital Signs Last 24 Hrs  T(C): 36.7 (17 Oct 2019 15:52), Max: 37.5 (16 Oct 2019 23:48)  T(F): 98.1 (17 Oct 2019 15:52), Max: 99.5 (16 Oct 2019 23:48)  HR: 82 (17 Oct 2019 15:52) (72 - 82)  BP: 135/68 (17 Oct 2019 15:52) (116/63 - 141/58)  BP(mean): --  RR: 18 (17 Oct 2019 15:52) (15 - 18)  SpO2: 96% (17 Oct 2019 15:52) (95% - 97%)    PHYSICAL EXAM  General: adult in NAD  HEENT: clear oropharynx, anicteric sclera, pink conjunctiva  Neck: supple  CV: normal S1/S2 with no murmur rubs or gallops  Lungs: positive air movement b/l ant lungs,clear to auscultation, no wheezes, no rales  Abdomen: soft non-tender non-distended, no hepatosplenomegaly  Ext: no clubbing cyanosis or edema  Skin: no rashes and no petechiae  Neuro: alert and oriented X 4, no focal deficits  LABS:                          10.2   8.29  )-----------( 50       ( 17 Oct 2019 07:52 )             33.4         Mean Cell Volume : 92.8 fl  Mean Cell Hemoglobin : 28.3 pg  Mean Cell Hemoglobin Concentration : 30.5 gm/dL  Auto Neutrophil # : x  Auto Lymphocyte # : x  Auto Monocyte # : x  Auto Eosinophil # : x  Auto Basophil # : x  Auto Neutrophil % : x  Auto Lymphocyte % : x  Auto Monocyte % : x  Auto Eosinophil % : x  Auto Basophil % : x    Serial CBC  Hematocrit 33.4  Hemoglobin 10.2  Plat 50  RBC 3.60  WBC 8.29  Serial CBC  Hematocrit 33.8  Hemoglobin 10.1  Plat 44  RBC 3.64  WBC 7.06  Serial CBC  Hematocrit 33.5  Hemoglobin 9.9  Plat 41  RBC 3.56  WBC 6.89  Serial CBC  Hematocrit 33.7  Hemoglobin 9.6  Plat 41  RBC 3.53  WBC 9.46    10-17    155<H>  |  124<H>  |  44<H>  ----------------------------<  115<H>  3.2<L>   |  25  |  0.77    Ca    7.6<L>      17 Oct 2019 07:52                      BLOOD SMEAR INTERPRETATION:       RADIOLOGY & ADDITIONAL STUDIES:

## 2019-10-17 NOTE — PROGRESS NOTE ADULT - SUBJECTIVE AND OBJECTIVE BOX
[facility] : Progress Note :: [phone], (office [phone]),   Dr Madrigal / Dr Garcia / Dr Stafford / Dr Anaya / Dr Janelle GALVAN / Dr Barron / Dr Melgoza / Dr Joe martines  _____________________________________________________________________________________________    hypernatremia worse.    No Known Allergies    Hospital Medications:   MEDICATIONS  (STANDING):  acetaminophen    Suspension .. 650 milliGRAM(s) Oral every 8 hours  dextrose 5%. 1000 milliLiter(s) (60 mL/Hr) IV Continuous <Continuous>  memantine 10 milliGRAM(s) Oral two times a day  meropenem  IVPB 500 milliGRAM(s) IV Intermittent every 12 hours  simvastatin 10 milliGRAM(s) Oral at bedtime  sodium chloride 0.65% Nasal 1 Spray(s) Both Nostrils every 8 hours        VITALS:  T(F): 97.6 (10-17-19 @ 07:58), Max: 99.5 (10-16-19 @ 23:48)  HR: 72 (10-17-19 @ 07:58)  BP: 116/63 (10-17-19 @ 07:58)  RR: 18 (10-17-19 @ 07:58)  SpO2: 97% (10-17-19 @ 07:58)  Wt(kg): --    10-16 @ 07:01  -  10-17 @ 07:00  --------------------------------------------------------  IN: 0 mL / OUT: 970 mL / NET: -970 mL    10-17 @ 07:01  -  10-17 @ 15:27  --------------------------------------------------------  IN: 60 mL / OUT: 0 mL / NET: 60 mL        PHYSICAL EXAM:  Constitutional: NAD  HEENT: anicteric sclera, oropharynx clear.  Neck: No JVD  Respiratory: CTAB, no wheezes, rales or rhonchi  Cardiovascular: S1, S2, RRR  Gastrointestinal: BS+, soft, NT/ND  Extremities:  peripheral edema+  Neurological: confused.  : No CVA tenderness. No jackson.         LABS:  10-17    155<H>  |  124<H>  |  44<H>  ----------------------------<  115<H>  3.2<L>   |  25  |  0.77    Ca    7.6<L>      17 Oct 2019 07:52      Creatinine Trend: 0.77 <--, 0.94 <--, 1.10 <--, 1.46 <--, 1.48 <--, 1.64 <--, 1.78 <--, 1.92 <--, 1.79 <--, 2.06 <--                        10.2   8.29  )-----------( 50       ( 17 Oct 2019 07:52 )             33.4     Urine Studies:  Urinalysis Basic - ( 12 Oct 2019 10:20 )    Color: Yellow / Appearance: Clear / S.010 / pH:   Gluc:  / Ketone: Negative  / Bili: Negative / Urobili: Negative   Blood:  / Protein: 30 mg/dL / Nitrite: Negative   Leuk Esterase: Small / RBC: 0-2 /HPF / WBC 6-10 /HPF   Sq Epi:  / Non Sq Epi:  / Bacteria: Trace /HPF      Sodium, Random Urine: 49 mmol/L (10-12 @ 20:26)  Creatinine, Random Urine: 70 mg/dL (10-12 @ 20:26)    RADIOLOGY & ADDITIONAL STUDIES:

## 2019-10-17 NOTE — PROGRESS NOTE ADULT - SUBJECTIVE AND OBJECTIVE BOX
Subjective:     No events overnight   Objective:    MEDICATIONS  (STANDING):  acetaminophen    Suspension .. 650 milliGRAM(s) Oral every 8 hours  dextrose 5%. 1000 milliLiter(s) (60 mL/Hr) IV Continuous <Continuous>  memantine 10 milliGRAM(s) Oral two times a day  meropenem  IVPB 500 milliGRAM(s) IV Intermittent every 12 hours  simvastatin 10 milliGRAM(s) Oral at bedtime  sodium chloride 0.65% Nasal 1 Spray(s) Both Nostrils every 8 hours    MEDICATIONS  (PRN):              Vital Signs Last 24 Hrs  T(C): 36.4 (17 Oct 2019 07:58), Max: 37.5 (16 Oct 2019 23:48)  T(F): 97.6 (17 Oct 2019 07:58), Max: 99.5 (16 Oct 2019 23:48)  HR: 72 (17 Oct 2019 07:58) (72 - 80)  BP: 116/63 (17 Oct 2019 07:58) (116/63 - 163/73)  BP(mean): --  RR: 18 (17 Oct 2019 07:58) (15 - 18)  SpO2: 97% (17 Oct 2019 07:58) (95% - 97%)      General:  Well developed, well nourished, alert and active, no pallor, NAD.  HEENT:    Normal appearance of conjunctiva, ears, nose, lips, oropharynx, and oral mucosa, anicteric.  Neck:  No masses, no asymmetry.  Lymph Nodes:  No lymphadenopathy.   Cardiovascular:  RRR normal S1/S2, no murmur.  Respiratory:  CTA B/L, normal respiratory effort.   Abdominal:   soft, no masses or tenderness, normoactive BS, NT/ND, no HSM.  Extremities:   No clubbing or cyanosis, normal capillary refill, no edema.   Skin:   No rash, jaundice, lesions, eczema.   Musculoskeletal:  No joint swelling, erythema or tenderness.   Neuro: No focal deficits.   Other:       LABS:                        10.2   8.29  )-----------( 50       ( 17 Oct 2019 07:52 )             33.4     10-17    155<H>  |  124<H>  |  44<H>  ----------------------------<  115<H>  3.2<L>   |  25  |  0.77    Ca    7.6<L>      17 Oct 2019 07:52            RADIOLOGY & ADDITIONAL TESTS:    < from: Xray Abdomen 1 View Portable, IMMEDIATE (10.16.19 @ 16:01) >    EXAM:  XR ABDOMEN PORTABLE IMMED 1V                            PROCEDURE DATE:  10/16/2019          INTERPRETATION:  CLINICAL INDICATION:  Assessment following PEG tube   exchange.    COMPARISON:  10/12/2019    TECHNIQUE:  Portable  radiography of the abdomen is performed   followed by portable AP and oblique imaging of the abdomen following   instillation of contrast material via the indwelling PEG tube.    FINDINGS:   radiography demonstrates a nonspecific bowel gas pattern   with no evidence for mechanical bowel obstruction. No free intraperineal   air is identified on this portable supine radiograph. Contrast material   from prior radiographic assessment is identified within the left colon   and rectum, with extensive colonic diverticulosis noted.    Contrast instilled via the PEG tube is identified within the stomach and   duodenal sweep, without evidence for extravasation.    IMPRESSION:  As above.                  LUIS CARLOS BAE   This document has been electronically signed. Oct 16 2019  4:14PM                < end of copied text >

## 2019-10-17 NOTE — PROGRESS NOTE ADULT - SUBJECTIVE AND OBJECTIVE BOX
NP Note discussed with  Primary Attending    Patient is a 89y old  Female who presents with a chief complaint of respiratory distress, fever (17 Oct 2019 15:27)    HPI:  89 year old female patient, bedbound, non verbal AAO x0 at baseline from [facility], with PMH significant for HTN, DM, HLD and dementia is sent from NH due to fever of 102F, congestion and oxygen saturation of 81%.  Pts G tube was found to be malfunctioning upon arrival, replaced in the ED.  Pt admitted for LLL PNA.  Treating with IV antibiotics. Well responding on current treatment for pneumonia,  WBC WNL, afebrile, breathing well on O2 supplement. However, b/l foot infection which is suspicious  OM, pt had a discussion with ID Dr. Rick the other day and had a discussion with Dr. Gonzalez from palliative care team today. Pt is hospice eligible, decided not to treat for Osteomyelitis, but treating pneumonia now then focusing to make pt comfortable as possible. daughter Anya and Moe agreed on hospice care, family [facility] as their first choice.  Podiatry team updated that hospice care referral made, no bone biopsy for wound, but will continue to provide loca wound care.  INTERVAL HPI/OVERNIGHT EVENTS: no new complaints    MEDICATIONS  (STANDING):  acetaminophen    Suspension .. 650 milliGRAM(s) Oral every 8 hours  dextrose 5%. 1000 milliLiter(s) (60 mL/Hr) IV Continuous <Continuous>  memantine 10 milliGRAM(s) Oral two times a day  meropenem  IVPB 500 milliGRAM(s) IV Intermittent every 12 hours  simvastatin 10 milliGRAM(s) Oral at bedtime  sodium chloride 0.65% Nasal 1 Spray(s) Both Nostrils every 8 hours    MEDICATIONS  (PRN):      __________________________________________________  REVIEW OF SYSTEMS:    CONSTITUTIONAL: No fever,   EYES: no acute visual disturbances  NECK: No pain or stiffness  RESPIRATORY: No cough; No shortness of breath  CARDIOVASCULAR: No chest pain, no palpitations  GASTROINTESTINAL: No pain. No nausea or vomiting; No diarrhea   NEUROLOGICAL: No headache or numbness, no tremors  MUSCULOSKELETAL: No joint pain, no muscle pain  GENITOURINARY: no dysuria, no frequency, no hesitancy  PSYCHIATRY: no depression , no anxiety  ALL OTHER  ROS negative        Vital Signs Last 24 Hrs  T(C): 36.4 (17 Oct 2019 07:58), Max: 37.5 (16 Oct 2019 23:48)  T(F): 97.6 (17 Oct 2019 07:58), Max: 99.5 (16 Oct 2019 23:48)  HR: 72 (17 Oct 2019 07:58) (72 - 80)  BP: 116/63 (17 Oct 2019 07:58) (116/63 - 163/73)  BP(mean): --  RR: 18 (17 Oct 2019 07:58) (15 - 18)  SpO2: 97% (17 Oct 2019 07:58) (95% - 97%)    ________________________________________________  PHYSICAL EXAM:  GENERAL: NAD  HEENT: Normocephalic;  conjunctivae and sclerae clear; moist mucous membranes;   NECK : supple  CHEST/LUNG: Clear to auscultation bilaterally with good air entry   HEART: S1 S2  regular; no murmurs, gallops or rubs  ABDOMEN: Soft, Nontender, Nondistended; Bowel sounds present  EXTREMITIES: no cyanosis; no edema; no calf tenderness  SKIN: warm and dry; no rash  NERVOUS SYSTEM:  Awake and alert; Oriented  to place, person and time ; no new deficits    _________________________________________________  LABS:                        10.2   8.29  )-----------( 50       ( 17 Oct 2019 07:52 )             33.4     10-17    155<H>  |  124<H>  |  44<H>  ----------------------------<  115<H>  3.2<L>   |  25  |  0.77    Ca    7.6<L>      17 Oct 2019 07:52          CAPILLARY BLOOD GLUCOSE            RADIOLOGY & ADDITIONAL TESTS:    Imaging Personally Reviewed:  YES/NO    Consultant(s) Notes Reviewed:   YES/ No    Care Discussed with Consultants :     Plan of care was discussed with patient and /or primary care giver; all questions and concerns were addressed and care was aligned with patient's wishes. NP Note discussed with  Primary Attending    Patient is a 89y old  Female who presents with a chief complaint of respiratory distress, fever (17 Oct 2019 15:27)    HPI:  89 year old female patient, bedbound, non verbal AAO x0 at baseline from [facility], with PMH significant for HTN, DM, HLD and dementia is sent from NH due to fever of 102F, congestion and oxygen saturation of 81%.  Pts G tube was found to be malfunctioning upon arrival, replaced in the ED.  Pt admitted for LLL PNA.  Treating with IV antibiotics. Well responding on current treatment for pneumonia,  WBC WNL, afebrile, breathing well on O2 supplement. However, b/l foot infection which is suspicious  OM, pt had a discussion with ID Dr. Rick the other day and had a discussion with Dr. Gonzalez from palliative care team today. Pt is hospice eligible, decided not to treat for Osteomyelitis, but treating pneumonia now then focusing to make pt comfortable as possible. daughter Anya and Moe agreed on hospice care, [facility] their [facility] choice.  Podiatry team updated that hospice care referral made, no bone biopsy for wound, but will continue to provide loca wound care. PEG tube feeding resumed and functioning well.       INTERVAL HPI/OVERNIGHT EVENTS: no new complaints    MEDICATIONS  (STANDING):  acetaminophen    Suspension .. 650 milliGRAM(s) Oral every 8 hours  dextrose 5%. 1000 milliLiter(s) (60 mL/Hr) IV Continuous <Continuous>  memantine 10 milliGRAM(s) Oral two times a day  meropenem  IVPB 500 milliGRAM(s) IV Intermittent every 12 hours  simvastatin 10 milliGRAM(s) Oral at bedtime  sodium chloride 0.65% Nasal 1 Spray(s) Both Nostrils every 8 hours    MEDICATIONS  (PRN):      __________________________________________________  REVIEW OF SYSTEMS:    unable to obtain bc pt is non-verbal     Vital Signs Last 24 Hrs  T(C): 36.4 (17 Oct 2019 07:58), Max: 37.5 (16 Oct 2019 23:48)  T(F): 97.6 (17 Oct 2019 07:58), Max: 99.5 (16 Oct 2019 23:48)  HR: 72 (17 Oct 2019 07:58) (72 - 80)  BP: 116/63 (17 Oct 2019 07:58) (116/63 - 163/73)  BP(mean): --  RR: 18 (17 Oct 2019 07:58) (15 - 18)  SpO2: 97% (17 Oct 2019 07:58) (95% - 97%)    ________________________________________________  PHYSICAL EXAM:  GENERAL: NAD  HEENT: closed eyes all times  NECK : supple  CHEST/LUNG: Clear to auscultation bilaterally with good air entry   HEART: S1 S2  regular; no murmurs, gallops or rubs  ABDOMEN: Soft, Nontender, Nondistended; Bowel sounds present, (+) PEG tube  EXTREMITIES: no cyanosis; no edema; no calf tenderness contractures to all extremities, dressing intact to both feet   SKIN: warm and dry; no rash  NERVOUS SYSTEM:  AOx 0     _________________________________________________  LABS:                        10.2   8.29  )-----------( 50       ( 17 Oct 2019 07:52 )             33.4     10-17    155<H>  |  124<H>  |  44<H>  ----------------------------<  115<H>  3.2<L>   |  25  |  0.77    Ca    7.6<L>      17 Oct 2019 07:52          CAPILLARY BLOOD GLUCOSE            RADIOLOGY & ADDITIONAL TESTS:    Imaging Personally Reviewed:  NO    Consultant(s) Notes Reviewed:   YES    Care Discussed with Consultants : Palliative Dr. Segovia , Nephro Dr. Madrigal Podiatry Dr. Tracey Whitaker     Plan of care was discussed with patient and /or primary care giver; all questions and concerns were addressed and care was aligned with patient's wishes.

## 2019-10-18 LAB
ANION GAP SERPL CALC-SCNC: 6 MMOL/L — SIGNIFICANT CHANGE UP (ref 5–17)
BUN SERPL-MCNC: 36 MG/DL — HIGH (ref 7–18)
CALCIUM SERPL-MCNC: 7.3 MG/DL — LOW (ref 8.4–10.5)
CHLORIDE SERPL-SCNC: 117 MMOL/L — HIGH (ref 96–108)
CO2 SERPL-SCNC: 25 MMOL/L — SIGNIFICANT CHANGE UP (ref 22–31)
CREAT SERPL-MCNC: 0.74 MG/DL — SIGNIFICANT CHANGE UP (ref 0.5–1.3)
GLUCOSE SERPL-MCNC: 140 MG/DL — HIGH (ref 70–99)
HCT VFR BLD CALC: 31.7 % — LOW (ref 34.5–45)
HGB BLD-MCNC: 9.7 G/DL — LOW (ref 11.5–15.5)
MCHC RBC-ENTMCNC: 28.2 PG — SIGNIFICANT CHANGE UP (ref 27–34)
MCHC RBC-ENTMCNC: 30.6 GM/DL — LOW (ref 32–36)
MCV RBC AUTO: 92.2 FL — SIGNIFICANT CHANGE UP (ref 80–100)
NRBC # BLD: 0 /100 WBCS — SIGNIFICANT CHANGE UP (ref 0–0)
PLATELET # BLD AUTO: 58 K/UL — LOW (ref 150–400)
POTASSIUM SERPL-MCNC: 3 MMOL/L — LOW (ref 3.5–5.3)
POTASSIUM SERPL-SCNC: 3 MMOL/L — LOW (ref 3.5–5.3)
RBC # BLD: 3.44 M/UL — LOW (ref 3.8–5.2)
RBC # FLD: 17.3 % — HIGH (ref 10.3–14.5)
SODIUM SERPL-SCNC: 148 MMOL/L — HIGH (ref 135–145)
WBC # BLD: 7.94 K/UL — SIGNIFICANT CHANGE UP (ref 3.8–10.5)
WBC # FLD AUTO: 7.94 K/UL — SIGNIFICANT CHANGE UP (ref 3.8–10.5)

## 2019-10-18 PROCEDURE — 99233 SBSQ HOSP IP/OBS HIGH 50: CPT

## 2019-10-18 RX ORDER — OXYCODONE HYDROCHLORIDE 5 MG/1
5 TABLET ORAL EVERY 6 HOURS
Refills: 0 | Status: DISCONTINUED | OUTPATIENT
Start: 2019-10-18 | End: 2019-10-23

## 2019-10-18 RX ORDER — ROBINUL 0.2 MG/ML
0.4 INJECTION INTRAMUSCULAR; INTRAVENOUS EVERY 6 HOURS
Refills: 0 | Status: DISCONTINUED | OUTPATIENT
Start: 2019-10-18 | End: 2019-10-23

## 2019-10-18 RX ORDER — POTASSIUM CHLORIDE 20 MEQ
40 PACKET (EA) ORAL ONCE
Refills: 0 | Status: COMPLETED | OUTPATIENT
Start: 2019-10-18 | End: 2019-10-18

## 2019-10-18 RX ORDER — POTASSIUM CHLORIDE 20 MEQ
10 PACKET (EA) ORAL
Refills: 0 | Status: COMPLETED | OUTPATIENT
Start: 2019-10-18 | End: 2019-10-18

## 2019-10-18 RX ORDER — POTASSIUM CHLORIDE 20 MEQ
10 PACKET (EA) ORAL DAILY
Refills: 0 | Status: DISCONTINUED | OUTPATIENT
Start: 2019-10-18 | End: 2019-10-23

## 2019-10-18 RX ORDER — POTASSIUM CHLORIDE 20 MEQ
20 PACKET (EA) ORAL
Refills: 0 | Status: COMPLETED | OUTPATIENT
Start: 2019-10-18 | End: 2019-10-18

## 2019-10-18 RX ORDER — POTASSIUM CHLORIDE 20 MEQ
10 PACKET (EA) ORAL DAILY
Refills: 0 | Status: DISCONTINUED | OUTPATIENT
Start: 2019-10-18 | End: 2019-10-18

## 2019-10-18 RX ORDER — POTASSIUM CHLORIDE 20 MEQ
40 PACKET (EA) ORAL ONCE
Refills: 0 | Status: DISCONTINUED | OUTPATIENT
Start: 2019-10-18 | End: 2019-10-18

## 2019-10-18 RX ORDER — ROBINUL 0.2 MG/ML
0.4 INJECTION INTRAMUSCULAR; INTRAVENOUS EVERY 6 HOURS
Refills: 0 | Status: DISCONTINUED | OUTPATIENT
Start: 2019-10-18 | End: 2019-10-18

## 2019-10-18 RX ORDER — POTASSIUM CHLORIDE 20 MEQ
20 PACKET (EA) ORAL
Refills: 0 | Status: DISCONTINUED | OUTPATIENT
Start: 2019-10-18 | End: 2019-10-18

## 2019-10-18 RX ADMIN — Medication 20 MILLIEQUIVALENT(S): at 21:19

## 2019-10-18 RX ADMIN — MEROPENEM 100 MILLIGRAM(S): 1 INJECTION INTRAVENOUS at 18:53

## 2019-10-18 RX ADMIN — MEMANTINE HYDROCHLORIDE 10 MILLIGRAM(S): 10 TABLET ORAL at 06:47

## 2019-10-18 RX ADMIN — Medication 40 MILLIEQUIVALENT(S): at 13:01

## 2019-10-18 RX ADMIN — Medication 10 MILLIEQUIVALENT(S): at 21:19

## 2019-10-18 RX ADMIN — Medication 650 MILLIGRAM(S): at 13:52

## 2019-10-18 RX ADMIN — MEMANTINE HYDROCHLORIDE 10 MILLIGRAM(S): 10 TABLET ORAL at 18:52

## 2019-10-18 RX ADMIN — Medication 1 SPRAY(S): at 21:20

## 2019-10-18 RX ADMIN — SIMVASTATIN 10 MILLIGRAM(S): 20 TABLET, FILM COATED ORAL at 21:20

## 2019-10-18 RX ADMIN — MEROPENEM 100 MILLIGRAM(S): 1 INJECTION INTRAVENOUS at 06:46

## 2019-10-18 RX ADMIN — Medication 100 MILLIEQUIVALENT(S): at 13:02

## 2019-10-18 RX ADMIN — HEPARIN SODIUM 5000 UNIT(S): 5000 INJECTION INTRAVENOUS; SUBCUTANEOUS at 18:52

## 2019-10-18 RX ADMIN — Medication 1 SPRAY(S): at 06:47

## 2019-10-18 RX ADMIN — Medication 650 MILLIGRAM(S): at 06:47

## 2019-10-18 RX ADMIN — Medication 650 MILLIGRAM(S): at 21:18

## 2019-10-18 RX ADMIN — SODIUM CHLORIDE 60 MILLILITER(S): 9 INJECTION, SOLUTION INTRAVENOUS at 06:47

## 2019-10-18 RX ADMIN — Medication 1 SPRAY(S): at 13:53

## 2019-10-18 RX ADMIN — Medication 20 MILLIEQUIVALENT(S): at 18:53

## 2019-10-18 RX ADMIN — HEPARIN SODIUM 5000 UNIT(S): 5000 INJECTION INTRAVENOUS; SUBCUTANEOUS at 06:47

## 2019-10-18 RX ADMIN — Medication 100 MILLIEQUIVALENT(S): at 13:53

## 2019-10-18 RX ADMIN — Medication 650 MILLIGRAM(S): at 22:00

## 2019-10-18 RX ADMIN — Medication 650 MILLIGRAM(S): at 07:24

## 2019-10-18 RX ADMIN — Medication 650 MILLIGRAM(S): at 14:30

## 2019-10-18 NOTE — PROGRESS NOTE ADULT - SUBJECTIVE AND OBJECTIVE BOX
Patient is a 89y old  Female who presents with a chief complaint of respiratory distress, fever (12 Oct 2019 16:04)      HPI:  89 year old female patient, bedbound, non verbal AAO x0 at baseline from [facility], with PMH significant for HTN, DM, HLD and dementia is sent from NH due to fever of 102F, congestion and oxygen saturation of 81%. On arrival, patient was noted to have rectal temperature of 105F, , O2 sat of 88%. She was placed on non-rebreather which improved SPO2 to 95%. In ED, it was noted that G tube was not draining and was erythematous no purulent discharge was noted. G-tube was replaced by ED provider. Her UA was negative, chest Xray showed: new left sided infiltrate at base.  As per charts, patient was being treated with zosyn for osteomyelitis of left foot for last 2 days. However, she continued to spike fever.     History was limited, patient's family was at bedside. Had detailed discussion about GOC, she is DNR/DNI, no pressor support or central line. (12 Oct 2019 12:37)    Pt. is seen bedside this PM by podiatry team with attending. Patient was not alert, aware, nor oriented. Dressing clean and intact. Offloading device intact for b/l heels. No acute events overnight.     PMH: Dementia  HTN (hypertension)  Hyperlipidemia    Allergies: No Known Allergies    Medications: albumin human 25% IVPB 50 milliLiter(s) IV Intermittent every 6 hours  dextrose 5%. 1000 milliLiter(s) IV Continuous <Continuous>  heparin  Injectable 5000 Unit(s) SubCutaneous every 12 hours  metroNIDAZOLE  IVPB 500 milliGRAM(s) IV Intermittent every 8 hours    FH:  PSX: No significant past surgical history    SH: albumin human 25% IVPB 50 milliLiter(s) IV Intermittent every 6 hours  dextrose 5%. 1000 milliLiter(s) IV Continuous <Continuous>  heparin  Injectable 5000 Unit(s) SubCutaneous every 12 hours  metroNIDAZOLE  IVPB 500 milliGRAM(s) IV Intermittent every 8 hours        Vital Signs Last 24 Hrs  T(C): 36.5 (18 Oct 2019 07:36), Max: 36.7 (17 Oct 2019 15:52)  T(F): 97.7 (18 Oct 2019 07:36), Max: 98.1 (17 Oct 2019 15:52)  HR: 82 (18 Oct 2019 07:36) (71 - 82)  BP: 121/52 (18 Oct 2019 07:36) (121/52 - 152/63)  BP(mean): --  RR: 17 (18 Oct 2019 07:36) (17 - 18)  SpO2: 97% (18 Oct 2019 07:36) (96% - 97%)        LABS                        9.7    7.94  )-----------( 58       ( 18 Oct 2019 07:27 )             31.7   10-18    148<H>  |  117<H>  |  36<H>  ----------------------------<  140<H>  3.0<L>   |  25  |  0.74    Ca    7.3<L>      18 Oct 2019 07:27                 WBC Count: 7.94 K/uL (10-18 @ 07:27)  WBC Count: 8.29 K/uL (10-17 @ 07:52)  WBC Count: 7.06 K/uL (10-16 @ 07:14)  WBC Count: 6.89 K/uL (10-15 @ 08:13)  WBC Count: 9.46 K/uL (10-14 @ 07:14)                                     <L< from: Xray Foot AP + Lateral, Left (10.12.19 @ 13:08) >      EXAM:  FOOT 2VIEWS LT                            PROCEDURE DATE:  10/12/2019          INTERPRETATION:  Radiographs of the left foot         CLINICAL INFORMATION:  Concern for osteomyelitis sepsis    TECHNIQUE: Vertebral AP and lateral views of the foot were obtained.    FINDINGS:   No prior similar studies are available for review.    The forefoot demonstrates no evidence of fracture. Normal alignment is   seen. Joint spaces are preserved. Sesamoids are intact.    The midfoot appears intact.    The hindfoot demonstrates no plantar calcaneal spur.    There is soft tissue ulceration seen at the level of the heel. There is   focal sclerosis of the posterior calcaneus and heterogeneous density of   the bony structures diffusely.    IMPRESSION: Diffuse osteopenia and heterogeneity in the density of the   bony structures. There is more focal increased density at the level of   the calcaneus where the soft tissue ulcer is seen. Vasculitis cannot be   excluded and recommend correlation with MR imaging of the foot       EUGENE EMERSON M.D.,ATTENDING RADIOLOGIST  This document has been electronically signed. Oct 12 2019  2:35PM    < end of copied text >    >      12 Oct 2019 14:45          PHYSICAL EXAM    LE Focused:    Vasc:  DP and PT pulses non-palpable b/l, +2 pitting edema noted b/l.  CFT delayed 5 seconds to digits.  Derm: Left heel ulcer measuring 5.0cm x 4.6cm x 0.5cm with red granular base and small area of fibrotic tissue. Probes to bone. No malodor, no purulence, no increase in warmth, minimal periwound erythema.    Neuro: unable to assess  MSK: Patient is in an overall contracted position        Cultures:  taken 10/12:   Culture - Surgical Swab (10.12.19 @ 22:28)    -  Gentamicin: S <=2    -  Levofloxacin: S <=2    -  Meropenem: S <=1    -  Piperacillin/Tazobactam: S <=8    -  Tobramycin: S <=2    -  Trimethoprim/Sulfamethoxazole: S <=2/38    -  Amikacin: S <=16    -  Amoxicillin/Clavulanic Acid: S <=8/4    -  Ampicillin: S <=8 These ampicillin results predict results for amoxicillin    -  Ampicillin/Sulbactam: S <=4/2 Enterobacter, Citrobacter, and Serratia may develop resistance during prolonged therapy (3-4 days)    -  Aztreonam: S <=4    -  Cefazolin: S <=2 Enterobacter, Citrobacter, and Serratia may develop resistance during prolonged therapy (3-4 days)    -  Cefepime: S <=2    -  Cefoxitin: S <=8    -  Ceftriaxone: S <=1 Enterobacter, Citrobacter, and Serratia may develop resistance during prolonged therapy    -  Ciprofloxacin: R >2    -  Ertapenem: S <=0.5    Specimen Source: .Surgical Swab    Culture Results:   Few Proteus mirabilis    Organism Identification: Proteus mirabilis    Organism: Proteus mirabilis    Method Type: KENTON        A: Left heel ulcer down to the level of bone with likely underlying osteomyelitis      P:  Patient evaluated, chart reviewed  Xrays reviewed- as above  Culture taken of left heel ulcer 10/12-reviewed  Wound dressed with betadine, 4x4 gauze, Carmen  Recommend continued Heel offloading in bed  Recommend antibiotics per primary team or ID  COLLIN/PVR ordered  Wound care order: betadine, 4x4 gauze, Carmen to heel wound.  Patient is stable to discharge from podiatry standpoint   Discussed and seen with attending Dr. Mcfadden

## 2019-10-18 NOTE — PROGRESS NOTE ADULT - ATTENDING COMMENTS
I was physically present for the key portions of the evaluation and management (E/M) service provided.  The patient was personally seen and examined at bedside.    Thank you for your consultation and allowing  me to participate in the care of your patients. If you have further questions please contact me at [phone].     Lei Tavera M.D.       _________________________________________________________________________________________________  [facility]  [address] [place] [address]  Office: [phone]    Cali Jaquez PA-C  ___________________________________________________________________________________________________
I was physically present for the key portions of the evaluation and management (E/M) service provided.  The patient was personally seen and examined at bedside.    Thank you for your consultation and allowing  me to participate in the care of your patients. If you have further questions please contact me at [phone].     Lei Tavera M.D.       _________________________________________________________________________________________________  [facility]  [address] [place] [address]  Office: [phone]    Cali Jaquez PA-C  ___________________________________________________________________________________________________

## 2019-10-18 NOTE — PROGRESS NOTE ADULT - SUBJECTIVE AND OBJECTIVE BOX
HPI:  89 year old female patient, bedbound, non verbal AAO x0 at baseline from [facility], with PMH significant for HTN, DM, HLD and dementia is sent from NH due to fever of 102F, congestion and oxygen saturation of 81%. On arrival, patient was noted to have rectal temperature of 105F, , O2 sat of 88%. She was placed on non-rebreather which improved SPO2 to 95%. In ED, it was noted that G tube was not draining and was erythematous no purulent discharge was noted. G-tube was replaced by ED provider. Her UA was negative, chest Xray showed: new left sided infiltrate at base.  As per charts, patient was being treated with zosyn for osteomyelitis of left foot for last 2 days. However, she continued to spike fever.     History was limited, patient's family was at bedside. Had detailed discussion about GOC, she is DNR/DNI, no pressor support or central line. (12 Oct 2019 12:37)      Patient is a 89y old  Female who presents with a chief complaint of respiratory distress, fever (18 Oct 2019 14:50)      INTERVAL HPI/OVERNIGHT EVENTS:  T(C): 36.5 (10-18-19 @ 07:36), Max: 36.7 (10-18-19 @ 00:48)  HR: 82 (10-18-19 @ 07:36) (71 - 82)  BP: 121/52 (10-18-19 @ 07:36) (121/52 - 152/63)  RR: 17 (10-18-19 @ 07:36) (17 - 18)  SpO2: 97% (10-18-19 @ 07:36) (96% - 97%)  Wt(kg): --  I&O's Summary    17 Oct 2019 07:01  -  18 Oct 2019 07:00  --------------------------------------------------------  IN: 1560 mL / OUT: 1225 mL / NET: 335 mL    18 Oct 2019 07:01  -  18 Oct 2019 15:52  --------------------------------------------------------  IN: 0 mL / OUT: 200 mL / NET: -200 mL        REVIEW OF SYSTEMS: denies fever, chills, SOB, palpitations, chest pain, abdominal pain, nausea, vomitting, diarrhea, constipation, dizziness    MEDICATIONS  (STANDING):  acetaminophen    Suspension .. 650 milliGRAM(s) Oral every 8 hours  dextrose 5%. 1000 milliLiter(s) (60 mL/Hr) IV Continuous <Continuous>  heparin  Injectable 5000 Unit(s) SubCutaneous every 12 hours  memantine 10 milliGRAM(s) Oral two times a day  meropenem  IVPB 500 milliGRAM(s) IV Intermittent every 12 hours  potassium chloride   Powder 10 milliEquivalent(s) Oral daily  potassium chloride   Powder 20 milliEquivalent(s) Oral every 2 hours  simvastatin 10 milliGRAM(s) Oral at bedtime  sodium chloride 0.65% Nasal 1 Spray(s) Both Nostrils every 8 hours    MEDICATIONS  (PRN):  glycopyrrolate Injectable 0.4 milliGRAM(s) IV Push every 6 hours PRN secretions  oxyCODONE    IR 5 milliGRAM(s) Oral every 6 hours PRN Severe Pain (7 - 10) or dyspnea      PHYSICAL EXAM:  GENERAL: NAD, well-groomed, well-developed  HEAD:  Atraumatic, Normocephalic  EYES: EOMI, PERRLA, conjunctiva and sclera clear  ENMT: No tonsillar erythema, exudates, or enlargement; Moist mucous membranes, Good dentition, No lesions  NECK: Supple, No JVD, Normal thyroid  NERVOUS SYSTEM:  Alert & Oriented X3, Good concentration; Motor Strength 5/5 B/L upper and lower extremities; DTRs 2+ intact and symmetric  CHEST/LUNG: Clear to percussion bilaterally; No rales, rhonchi, wheezing, or rubs  HEART: Regular rate and rhythm; No murmurs, rubs, or gallops  ABDOMEN: Soft, Nontender, Nondistended; Bowel sounds present  EXTREMITIES:  2+ Peripheral Pulses, No clubbing, cyanosis, or edema  LYMPH: No lymphadenopathy noted  SKIN: No rashes or lesions  LABS:                        9.7    7.94  )-----------( 58       ( 18 Oct 2019 07:27 )             31.7     10-18    148<H>  |  117<H>  |  36<H>  ----------------------------<  140<H>  3.0<L>   |  25  |  0.74    Ca    7.3<L>      18 Oct 2019 07:27          CAPILLARY BLOOD GLUCOSE

## 2019-10-18 NOTE — PROGRESS NOTE ADULT - SUBJECTIVE AND OBJECTIVE BOX
OVERNIGHT EVENTS:    Present Symptoms:   Dyspnea:   Nausea/Vomiting:   Anxiety:    Depressed Mood:   Fatigue:   Loss of appetite:   Pain:                   location:   Constipation:  Review of Systems: [All others negative or Unable to obtain due to poor mentation]    MEDICATIONS  (STANDING):  acetaminophen    Suspension .. 650 milliGRAM(s) Oral every 8 hours  dextrose 5%. 1000 milliLiter(s) (60 mL/Hr) IV Continuous <Continuous>  heparin  Injectable 5000 Unit(s) SubCutaneous every 12 hours  memantine 10 milliGRAM(s) Oral two times a day  meropenem  IVPB 500 milliGRAM(s) IV Intermittent every 12 hours  potassium chloride   Powder 10 milliEquivalent(s) Oral daily  potassium chloride   Powder 20 milliEquivalent(s) Oral every 2 hours  simvastatin 10 milliGRAM(s) Oral at bedtime  sodium chloride 0.65% Nasal 1 Spray(s) Both Nostrils every 8 hours    MEDICATIONS  (PRN):  glycopyrrolate Injectable 0.4 milliGRAM(s) IV Push every 6 hours PRN secretions  oxyCODONE    IR 5 milliGRAM(s) Oral every 6 hours PRN Severe Pain (7 - 10) or dyspnea      PHYSICAL EXAM:  Vital Signs Last 24 Hrs  T(C): 36.5 (18 Oct 2019 07:36), Max: 36.7 (17 Oct 2019 15:52)  T(F): 97.7 (18 Oct 2019 07:36), Max: 98.1 (17 Oct 2019 15:52)  HR: 82 (18 Oct 2019 07:36) (71 - 82)  BP: 121/52 (18 Oct 2019 07:36) (121/52 - 152/63)  BP(mean): --  RR: 17 (18 Oct 2019 07:36) (17 - 18)  SpO2: 97% (18 Oct 2019 07:36) (96% - 97%)     Karnofsky Performance Score/Palliative Performance Status Version2:    %  General:  HEENT: NC, sclera anicteric, neck supple  Lungs: unlabored  CV: normal  tachycardia  GI: soft, NT, ND  :    Musculoskeletal:    Skin: no rashes or lesions  Neuro:    Oral intake:    Diet: NPO     LABS:                          9.7    7.94  )-----------( 58       ( 18 Oct 2019 07:27 )             31.7     10-18    148<H>  |  117<H>  |  36<H>  ----------------------------<  140<H>  3.0<L>   |  25  |  0.74    Ca    7.3<L>      18 Oct 2019 07:27          RADIOLOGY & ADDITIONAL STUDIES:    ADVANCE DIRECTIVES: OVERNIGHT EVENTS: intermittent grunting, audible secretions    Present Symptoms:      Review of Systems:   Unable to obtain due to poor mentation     MEDICATIONS  (STANDING):  acetaminophen    Suspension .. 650 milliGRAM(s) Oral every 8 hours  dextrose 5%. 1000 milliLiter(s) (60 mL/Hr) IV Continuous <Continuous>  heparin  Injectable 5000 Unit(s) SubCutaneous every 12 hours  memantine 10 milliGRAM(s) Oral two times a day  meropenem  IVPB 500 milliGRAM(s) IV Intermittent every 12 hours  potassium chloride   Powder 10 milliEquivalent(s) Oral daily  potassium chloride   Powder 20 milliEquivalent(s) Oral every 2 hours  simvastatin 10 milliGRAM(s) Oral at bedtime  sodium chloride 0.65% Nasal 1 Spray(s) Both Nostrils every 8 hours    MEDICATIONS  (PRN):  glycopyrrolate Injectable 0.4 milliGRAM(s) IV Push every 6 hours PRN secretions  oxyCODONE    IR 5 milliGRAM(s) Oral every 6 hours PRN Severe Pain (7 - 10) or dyspnea      PHYSICAL EXAM:  Vital Signs Last 24 Hrs  T(C): 36.5 (18 Oct 2019 07:36), Max: 36.7 (17 Oct 2019 15:52)  T(F): 97.7 (18 Oct 2019 07:36), Max: 98.1 (17 Oct 2019 15:52)  HR: 82 (18 Oct 2019 07:36) (71 - 82)  BP: 121/52 (18 Oct 2019 07:36) (121/52 - 152/63)  BP(mean): --  RR: 17 (18 Oct 2019 07:36) (17 - 18)  SpO2: 97% (18 Oct 2019 07:36) (96% - 97%)     Karnofsky Performance Score/Palliative Performance Status Version2:  30  %  General: intermittently grunting, frail, nonverbal, NAD  HEENT: NC, sclera anicteric, neck supple, +audible secretions  Lungs: unlabored  CV: S1, S2  GI: soft, NT, ND, PEG in place   Musculoskeletal: no edema, foot w dressing in place  Skin: Stage IV L heel, DTI elbow  Neuro:  lethargic, nonverbal  Oral intake:  TF    LABS:                          9.7    7.94  )-----------( 58       ( 18 Oct 2019 07:27 )             31.7     10-18    148<H>  |  117<H>  |  36<H>  ----------------------------<  140<H>  3.0<L>   |  25  |  0.74    Ca    7.3<L>      18 Oct 2019 07:27          RADIOLOGY & ADDITIONAL STUDIES:    ADVANCE DIRECTIVES:

## 2019-10-18 NOTE — CHART NOTE - NSCHARTNOTEFT_GEN_A_CORE
Assessment:   Patient reports [ ] nausea  [ ] vomiting [ ] diarrhea [ ] constipation  [ ]chewing problems [ ] swallowing issues  [ ] other:  Pt Visited TF  infusing Tf Tolerated. Labs Noted   PO intake:   Source for PO intake [ ] Patient/family [ ] chart [ ] staff     Current Weight: Weight (kg): 72.6 (10-12 @ 09:48)  % Weight Change    Pertinent Medications: MEDICATIONS  (STANDING):  acetaminophen    Suspension .. 650 milliGRAM(s) Oral every 8 hours  dextrose 5%. 1000 milliLiter(s) (60 mL/Hr) IV Continuous <Continuous>  heparin  Injectable 5000 Unit(s) SubCutaneous every 12 hours  memantine 10 milliGRAM(s) Oral two times a day  meropenem  IVPB 500 milliGRAM(s) IV Intermittent every 12 hours  potassium chloride  10 mEq/100 mL IVPB 10 milliEquivalent(s) IV Intermittent every 1 hour  simvastatin 10 milliGRAM(s) Oral at bedtime  sodium chloride 0.65% Nasal 1 Spray(s) Both Nostrils every 8 hours    MEDICATIONS  (PRN):    Pertinent Labs:  10-18 Na148 mmol/L<H> Glu 140 mg/dL<H> K+ 3.0 mmol/L<L> Cr  0.74 mg/dL BUN 36 mg/dL<H> 10-14 Phos 3.9 mg/dL 10-14 Alb 2.1 g/dL<L> 10-13 [id number] 6.9 %<H> 10-13 Chol 90 mg/dL LDL 42 mg/dL HDL 18 mg/dL<L> Trig 149 mg/dL      Skin:     Estimated Needs:   [ ] no change since previous assessment  [ ] recalculated:       Previous Nutrition Diagnosis:   [ ] Inadequate Energy Intake [ ]Inadequate Oral Intake [ ] Excessive Energy Intake   [ ] Underweight [ ] Increased Nutrient Needs [ ] Overweight/Obesity   [ ] Altered GI Function [ ] Unintended Weight Loss [ ] Food & Nutrition Related Knowledge Deficit [ ] Malnutrition     Nutrition Diagnosis is [ ] ongoing  [ ] resolved [ ] not applicable     New Nutrition Diagnosis: [ ] not applicable  [ ] Inadequate Energy Intake [ ]Inadequate Oral Intake [ ] Excessive Energy Intake   [ ] Underweight [ ] Increased Nutrient Needs [ ] Overweight/Obesity   [ ] Altered GI Function [ ] Unintended Weight Loss [ ] Food & Nutrition Related Knowledge Deficit [ ] Malnutrition     Related to:     As evidenced by:     Interventions:   Recommend  [ ] Change Diet To:  [ ] Nutrition Supplement  [x ] Nutrition Support Glucerna 1.2 start at 20 ml/hr increase by 20 ml/hr q 8 hrs to initial Goal rate @ 55 ml/hr x 16 hr as tolerated to provide 880 [id number] calories, Protein 53 gms, free water 708 ml/day.  [ x] Other: D/W RN, NP    Monitoring and Evaluation:   [ ] PO intake [ ] Tolerance to diet prescription [ ] weights [ ] follow up per protocol  [ ] other: Assessment:   Patient reports [ ] nausea  [ ] vomiting [ ] diarrhea [ ] constipation  [ ]chewing problems [ ] swallowing issues  [ ] other:  Pt Visited TF  infusing Tf Tolerated. Labs Noted   PO intake:   Source for PO intake [ ] Patient/family [ ] chart [ ] staff     Current Weight: Weight (kg): 72.6 (10-12 @ 09:48)  % Weight Change    Pertinent Medications: MEDICATIONS  (STANDING):  acetaminophen    Suspension .. 650 milliGRAM(s) Oral every 8 hours  dextrose 5%. 1000 milliLiter(s) (60 mL/Hr) IV Continuous <Continuous>  heparin  Injectable 5000 Unit(s) SubCutaneous every 12 hours  memantine 10 milliGRAM(s) Oral two times a day  meropenem  IVPB 500 milliGRAM(s) IV Intermittent every 12 hours  potassium chloride  10 mEq/100 mL IVPB 10 milliEquivalent(s) IV Intermittent every 1 hour  simvastatin 10 milliGRAM(s) Oral at bedtime  sodium chloride 0.65% Nasal 1 Spray(s) Both Nostrils every 8 hours    MEDICATIONS  (PRN):    Pertinent Labs:  10-18 Na148 mmol/L<H> Glu 140 mg/dL<H> K+ 3.0 mmol/L<L> Cr  0.74 mg/dL BUN 36 mg/dL<H> 10-14 Phos 3.9 mg/dL 10-14 Alb 2.1 g/dL<L> 10-13 [id number] 6.9 %<H> 10-13 Chol 90 mg/dL LDL 42 mg/dL HDL 18 mg/dL<L> Trig 149 mg/dL      Skin: Multiple pressure ulcers    Estimated Needs:   [ ] no change since previous assessment  [ ] recalculated:   (x) ~960 ml-1280 calories,  Protein ~51-64 gms /day.    Previous Nutrition Diagnosis:   [ ] Inadequate Energy Intake [ ]Inadequate Oral Intake [ ] Excessive Energy Intake   [ ] Underweight [x ] Increased Nutrient Needs [ ] Overweight/Obesity   [ ] Altered GI Function [ ] Unintended Weight Loss [ ] Food & Nutrition Related Knowledge Deficit [ ] Malnutrition     Nutrition Diagnosis is [x ] ongoing  [ ] resolved [ ] not applicable     New Nutrition Diagnosis: [ ] not applicable  [ ] Inadequate Energy Intake [ ]Inadequate Oral Intake [ ] Excessive Energy Intake   [ ] Underweight [ ] Increased Nutrient Needs [ ] Overweight/Obesity   [ ] Altered GI Function [ ] Unintended Weight Loss [ ] Food & Nutrition Related Knowledge Deficit [ ] Malnutrition     Related to:     As evidenced by:     Interventions:   Recommend  [ ] Change Diet To:  [ ] Nutrition Supplement  [x ] Nutrition Support Glucerna 1.2 start at 20 ml/hr increase by 20 ml/hr q 8 hrs to initial Goal rate @ 55 ml/hr x 16 hr as tolerated to provide 880 [id number] calories, Protein 53 gms, free water 708 ml/day.  [ x] Other: D/W RN, NP    Monitoring and Evaluation:   [ ] PO intake [ ] Tolerance to diet prescription [ ] weights [ ] follow up per protocol  [ ] other:

## 2019-10-19 RX ORDER — SODIUM CHLORIDE 9 MG/ML
1000 INJECTION, SOLUTION INTRAVENOUS
Refills: 0 | Status: DISCONTINUED | OUTPATIENT
Start: 2019-10-19 | End: 2019-10-23

## 2019-10-19 RX ADMIN — OXYCODONE HYDROCHLORIDE 5 MILLIGRAM(S): 5 TABLET ORAL at 13:23

## 2019-10-19 RX ADMIN — ROBINUL 0.4 MILLIGRAM(S): 0.2 INJECTION INTRAMUSCULAR; INTRAVENOUS at 17:58

## 2019-10-19 RX ADMIN — MEROPENEM 100 MILLIGRAM(S): 1 INJECTION INTRAVENOUS at 05:30

## 2019-10-19 RX ADMIN — Medication 650 MILLIGRAM(S): at 06:30

## 2019-10-19 RX ADMIN — Medication 650 MILLIGRAM(S): at 05:29

## 2019-10-19 RX ADMIN — Medication 650 MILLIGRAM(S): at 22:14

## 2019-10-19 RX ADMIN — MEMANTINE HYDROCHLORIDE 10 MILLIGRAM(S): 10 TABLET ORAL at 17:53

## 2019-10-19 RX ADMIN — Medication 1 SPRAY(S): at 22:14

## 2019-10-19 RX ADMIN — Medication 650 MILLIGRAM(S): at 14:22

## 2019-10-19 RX ADMIN — OXYCODONE HYDROCHLORIDE 5 MILLIGRAM(S): 5 TABLET ORAL at 22:20

## 2019-10-19 RX ADMIN — Medication 1 SPRAY(S): at 14:04

## 2019-10-19 RX ADMIN — SODIUM CHLORIDE 60 MILLILITER(S): 9 INJECTION, SOLUTION INTRAVENOUS at 17:53

## 2019-10-19 RX ADMIN — MEROPENEM 100 MILLIGRAM(S): 1 INJECTION INTRAVENOUS at 17:52

## 2019-10-19 RX ADMIN — SODIUM CHLORIDE 60 MILLILITER(S): 9 INJECTION, SOLUTION INTRAVENOUS at 22:15

## 2019-10-19 RX ADMIN — SIMVASTATIN 10 MILLIGRAM(S): 20 TABLET, FILM COATED ORAL at 22:14

## 2019-10-19 RX ADMIN — Medication 650 MILLIGRAM(S): at 22:31

## 2019-10-19 RX ADMIN — HEPARIN SODIUM 5000 UNIT(S): 5000 INJECTION INTRAVENOUS; SUBCUTANEOUS at 05:29

## 2019-10-19 RX ADMIN — MEMANTINE HYDROCHLORIDE 10 MILLIGRAM(S): 10 TABLET ORAL at 05:30

## 2019-10-19 RX ADMIN — OXYCODONE HYDROCHLORIDE 5 MILLIGRAM(S): 5 TABLET ORAL at 12:53

## 2019-10-19 RX ADMIN — Medication 10 MILLIEQUIVALENT(S): at 12:53

## 2019-10-19 RX ADMIN — Medication 1 SPRAY(S): at 05:30

## 2019-10-19 RX ADMIN — HEPARIN SODIUM 5000 UNIT(S): 5000 INJECTION INTRAVENOUS; SUBCUTANEOUS at 17:52

## 2019-10-19 RX ADMIN — Medication 650 MILLIGRAM(S): at 14:52

## 2019-10-19 NOTE — PROGRESS NOTE ADULT - SUBJECTIVE AND OBJECTIVE BOX
HPI:  89 year old female patient, bedbound, non verbal AAO x0 at baseline from [facility], with PMH significant for HTN, DM, HLD and dementia is sent from NH due to fever of 102F, congestion and oxygen saturation of 81%. On arrival, patient was noted to have rectal temperature of 105F, , O2 sat of 88%. She was placed on non-rebreather which improved SPO2 to 95%. In ED, it was noted that G tube was not draining and was erythematous no purulent discharge was noted. G-tube was replaced by ED provider. Her UA was negative, chest Xray showed: new left sided infiltrate at base.  As per charts, patient was being treated with zosyn for osteomyelitis of left foot for last 2 days. However, she continued to spike fever.     History was limited, patient's family was at bedside. Had detailed discussion about GOC, she is DNR/DNI, no pressor support or central line. (12 Oct 2019 12:37)      Patient is a 89y old  Female who presents with a chief complaint of respiratory distress, fever (19 Oct 2019 16:58)      INTERVAL HPI/OVERNIGHT EVENTS:  T(C): 36.4 (10-19-19 @ 16:23), Max: 36.9 (10-18-19 @ 23:40)  HR: 92 (10-19-19 @ 16:23) (77 - 92)  BP: 110/53 (10-19-19 @ 16:23) (110/53 - 124/73)  RR: 16 (10-19-19 @ 16:23) (16 - 19)  SpO2: 97% (10-19-19 @ 16:23) (95% - 98%)  Wt(kg): --  I&O's Summary    18 Oct 2019 07:01  -  19 Oct 2019 07:00  --------------------------------------------------------  IN: 0 mL / OUT: 840 mL / NET: -840 mL    19 Oct 2019 07:01  -  19 Oct 2019 17:14  --------------------------------------------------------  IN: 0 mL / OUT: 500 mL / NET: -500 mL        REVIEW OF SYSTEMS: denies fever, chills, SOB, palpitations, chest pain, abdominal pain, nausea, vomitting, diarrhea, constipation, dizziness    MEDICATIONS  (STANDING):  acetaminophen    Suspension .. 650 milliGRAM(s) Oral every 8 hours  dextrose 5%. 1000 milliLiter(s) (60 mL/Hr) IV Continuous <Continuous>  dextrose 5%. 1000 milliLiter(s) (60 mL/Hr) IV Continuous <Continuous>  heparin  Injectable 5000 Unit(s) SubCutaneous every 12 hours  memantine 10 milliGRAM(s) Oral two times a day  meropenem  IVPB 500 milliGRAM(s) IV Intermittent every 12 hours  potassium chloride   Powder 10 milliEquivalent(s) Oral daily  simvastatin 10 milliGRAM(s) Oral at bedtime  sodium chloride 0.65% Nasal 1 Spray(s) Both Nostrils every 8 hours    MEDICATIONS  (PRN):  glycopyrrolate Injectable 0.4 milliGRAM(s) IV Push every 6 hours PRN secretions  oxyCODONE    IR 5 milliGRAM(s) Oral every 6 hours PRN Severe Pain (7 - 10) or dyspnea      PHYSICAL EXAM:  GENERAL: NAD, well-groomed, well-developed  HEAD:  Atraumatic, Normocephalic  EYES: EOMI, PERRLA, conjunctiva and sclera clear  ENMT: No tonsillar erythema, exudates, or enlargement; Moist mucous membranes, Good dentition, No lesions  NECK: Supple, No JVD, Normal thyroid  NERVOUS SYSTEM:  Alert & Oriented X3, Good concentration; Motor Strength 5/5 B/L upper and lower extremities; DTRs 2+ intact and symmetric  CHEST/LUNG: Clear to percussion bilaterally; No rales, rhonchi, wheezing, or rubs  HEART: Regular rate and rhythm; No murmurs, rubs, or gallops  ABDOMEN: Soft, Nontender, Nondistended; Bowel sounds present  EXTREMITIES:  2+ Peripheral Pulses, No clubbing, cyanosis, or edema  LYMPH: No lymphadenopathy noted  SKIN: No rashes or lesions  LABS:                        9.7    7.94  )-----------( 58       ( 18 Oct 2019 07:27 )             31.7     10-18    148<H>  |  117<H>  |  36<H>  ----------------------------<  140<H>  3.0<L>   |  25  |  0.74    Ca    7.3<L>      18 Oct 2019 07:27          CAPILLARY BLOOD GLUCOSE

## 2019-10-19 NOTE — PROGRESS NOTE ADULT - SUBJECTIVE AND OBJECTIVE BOX
Patient is a 89y old  Female who presents with a chief complaint of respiratory distress, fever (12 Oct 2019 16:04)      HPI:  89 year old female patient, bedbound, non verbal AAO x0 at baseline from [facility], with PMH significant for HTN, DM, HLD and dementia is sent from NH due to fever of 102F, congestion and oxygen saturation of 81%. On arrival, patient was noted to have rectal temperature of 105F, , O2 sat of 88%. She was placed on non-rebreather which improved SPO2 to 95%. In ED, it was noted that G tube was not draining and was erythematous no purulent discharge was noted. G-tube was replaced by ED provider. Her UA was negative, chest Xray showed: new left sided infiltrate at base.  As per charts, patient was being treated with zosyn for osteomyelitis of left foot for last 2 days. However, she continued to spike fever.     History was limited, patient's family was at bedside. Had detailed discussion about GOC, she is DNR/DNI, no pressor support or central line. (12 Oct 2019 12:37)    Pt. is seen bedside this AM by podiatry. Patient is awake but not alert, aware, nor oriented. Dressing clean and intact. Offloading device intact for b/l heels. No acute events overnight. Pt. groans during dressing change.     PMH: Dementia  HTN (hypertension)  Hyperlipidemia    Allergies: No Known Allergies    Medications: albumin human 25% IVPB 50 milliLiter(s) IV Intermittent every 6 hours  dextrose 5%. 1000 milliLiter(s) IV Continuous <Continuous>  heparin  Injectable 5000 Unit(s) SubCutaneous every 12 hours  metroNIDAZOLE  IVPB 500 milliGRAM(s) IV Intermittent every 8 hours    FH:  PSX: No significant past surgical history    SH: albumin human 25% IVPB 50 milliLiter(s) IV Intermittent every 6 hours  dextrose 5%. 1000 milliLiter(s) IV Continuous <Continuous>  heparin  Injectable 5000 Unit(s) SubCutaneous every 12 hours  metroNIDAZOLE  IVPB 500 milliGRAM(s) IV Intermittent every 8 hours        Vital Signs Last 24 Hrs  T(C): 36.9 (19 Oct 2019 08:10), Max: 36.9 (18 Oct 2019 23:40)  T(F): 98.4 (19 Oct 2019 08:10), Max: 98.4 (18 Oct 2019 23:40)  HR: 77 (19 Oct 2019 08:10) (71 - 79)  BP: 116/52 (19 Oct 2019 08:10) (116/52 - 124/73)  BP(mean): --  RR: 18 (19 Oct 2019 08:10) (18 - 19)  SpO2: 95% (19 Oct 2019 08:10) (95% - 98%)      LABS                                   9.7    7.94  )-----------( 58       ( 18 Oct 2019 07:27 )             31.7   10-18    148<H>  |  117<H>  |  36<H>  ----------------------------<  140<H>  3.0<L>   |  25  |  0.74    Ca    7.3<L>      18 Oct 2019 07:27                   WBC Count: 7.94 K/uL (10-18 @ 07:27)  WBC Count: 8.29 K/uL (10-17 @ 07:52)  WBC Count: 7.94 K/uL (10-18 @ 07:27)  WBC Count: 8.29 K/uL (10-17 @ 07:52)  WBC Count: 7.06 K/uL (10-16 @ 07:14)  WBC Count: 6.89 K/uL (10-15 @ 08:13)  WBC Count: 9.46 K/uL (10-14 @ 07:14)                                     <L< from: Xray Foot AP + Lateral, Left (10.12.19 @ 13:08) >      EXAM:  FOOT 2VIEWS                             PROCEDURE DATE:  10/12/2019          INTERPRETATION:  Radiographs of the left foot         CLINICAL INFORMATION:  Concern for osteomyelitis sepsis    TECHNIQUE: Vertebral AP and lateral views of the foot were obtained.    FINDINGS:   No prior similar studies are available for review.    The forefoot demonstrates no evidence of fracture. Normal alignment is   seen. Joint spaces are preserved. Sesamoids are intact.    The midfoot appears intact.    The hindfoot demonstrates no plantar calcaneal spur.    There is soft tissue ulceration seen at the level of the heel. There is   focal sclerosis of the posterior calcaneus and heterogeneous density of   the bony structures diffusely.    IMPRESSION: Diffuse osteopenia and heterogeneity in the density of the   bony structures. There is more focal increased density at the level of   the calcaneus where the soft tissue ulcer is seen. Vasculitis cannot be   excluded and recommend correlation with MR imaging of the foot       EUGENE EMERSON M.D.,ATTENDING RADIOLOGIST  This document has been electronically signed. Oct 12 2019  2:35PM    < end of copied text >    >      12 Oct 2019 14:45          PHYSICAL EXAM    LE Focused:    Vasc:  DP and PT pulses non-palpable b/l, +2 pitting edema noted b/l.  CFT delayed 5 seconds to digits.  Derm: Left heel ulcer measuring 5.0cm x 4.6cm x 0.5cm with red granular base and small area of fibrotic tissue. Probes to bone. No malodor, no purulence, no increase in warmth, minimal periwound erythema.    Neuro: unable to assess  MSK: Patient is in an overall contracted position        Cultures:  taken 10/12:   Culture - Surgical Swab (10.12.19 @ 22:28)    -  Gentamicin: S <=2    -  Levofloxacin: S <=2    -  Meropenem: S <=1    -  Piperacillin/Tazobactam: S <=8    -  Tobramycin: S <=2    -  Trimethoprim/Sulfamethoxazole: S <=2/38    -  Amikacin: S <=16    -  Amoxicillin/Clavulanic Acid: S <=8/4    -  Ampicillin: S <=8 These ampicillin results predict results for amoxicillin    -  Ampicillin/Sulbactam: S <=4/2 Enterobacter, Citrobacter, and Serratia may develop resistance during prolonged therapy (3-4 days)    -  Aztreonam: S <=4    -  Cefazolin: S <=2 Enterobacter, Citrobacter, and Serratia may develop resistance during prolonged therapy (3-4 days)    -  Cefepime: S <=2    -  Cefoxitin: S <=8    -  Ceftriaxone: S <=1 Enterobacter, Citrobacter, and Serratia may develop resistance during prolonged therapy    -  Ciprofloxacin: R >2    -  Ertapenem: S <=0.5    Specimen Source: .Surgical Swab    Culture Results:   Few Proteus mirabilis    Organism Identification: Proteus mirabilis    Organism: Proteus mirabilis    Method Type: KENTON        A: Left heel ulcer down to the level of bone with likely underlying osteomyelitis      P:  Patient evaluated, chart reviewed  Xrays reviewed- as above  Culture taken of left heel ulcer 10/12-reviewed  Wound dressed with betadine, 4x4 gauze, Carmen  Recommend continued Heel offloading in bed  Recommend antibiotics per primary team or ID  COLLIN/PVR ordered  Wound care order: betadine, 4x4 gaCarmen anne to heel wound.  Patient is stable to discharge from podiatry standpoint   Discussed with attending Dr. Mcfadden

## 2019-10-19 NOTE — PROGRESS NOTE ADULT - SUBJECTIVE AND OBJECTIVE BOX
condition same  no fever, no sob  not answering question    MEDICATIONS  (STANDING):  acetaminophen    Suspension .. 650 milliGRAM(s) Oral every 8 hours  dextrose 5%. 1000 milliLiter(s) (60 mL/Hr) IV Continuous <Continuous>  dextrose 5%. 1000 milliLiter(s) (60 mL/Hr) IV Continuous <Continuous>  heparin  Injectable 5000 Unit(s) SubCutaneous every 12 hours  memantine 10 milliGRAM(s) Oral two times a day  meropenem  IVPB 500 milliGRAM(s) IV Intermittent every 12 hours  potassium chloride   Powder 10 milliEquivalent(s) Oral daily  simvastatin 10 milliGRAM(s) Oral at bedtime  sodium chloride 0.65% Nasal 1 Spray(s) Both Nostrils every 8 hours    MEDICATIONS  (PRN):  glycopyrrolate Injectable 0.4 milliGRAM(s) IV Push every 6 hours PRN secretions  oxyCODONE    IR 5 milliGRAM(s) Oral every 6 hours PRN Severe Pain (7 - 10) or dyspnea      Allergies    No Known Allergies    Intolerances        Vital Signs Last 24 Hrs  T(C): 36.4 (19 Oct 2019 16:23), Max: 36.9 (18 Oct 2019 23:40)  T(F): 97.6 (19 Oct 2019 16:23), Max: 98.4 (18 Oct 2019 23:40)  HR: 92 (19 Oct 2019 16:23) (77 - 92)  BP: 110/53 (19 Oct 2019 16:23) (110/53 - 124/73)  BP(mean): --  RR: 16 (19 Oct 2019 16:23) (16 - 19)  SpO2: 97% (19 Oct 2019 16:23) (95% - 98%)    PHYSICAL EXAM  General: adult in NAD  HEENT: clear oropharynx, anicteric sclera, pink conjunctiva  Neck: supple  CV: normal S1/S2 with no murmur rubs or gallops  Lungs: positive air movement b/l ant lungs,clear to auscultation, no wheezes, no rales  Abdomen: soft non-tender non-distended, no hepatosplenomegaly  Ext: no clubbing cyanosis or edema  Skin: no rashes and no petechiae  Neuro: alert and oriented X 4, no focal deficits  LABS:                          9.7    7.94  )-----------( 58       ( 18 Oct 2019 07:27 )             31.7         Mean Cell Volume : 92.2 fl  Mean Cell Hemoglobin : 28.2 pg  Mean Cell Hemoglobin Concentration : 30.6 gm/dL  Auto Neutrophil # : x  Auto Lymphocyte # : x  Auto Monocyte # : x  Auto Eosinophil # : x  Auto Basophil # : x  Auto Neutrophil % : x  Auto Lymphocyte % : x  Auto Monocyte % : x  Auto Eosinophil % : x  Auto Basophil % : x    Serial CBC  Hematocrit 31.7  Hemoglobin 9.7  Plat 58  RBC 3.44  WBC 7.94  Serial CBC  Hematocrit 33.4  Hemoglobin 10.2  Plat 50  RBC 3.60  WBC 8.29  Serial CBC  Hematocrit 33.8  Hemoglobin 10.1  Plat 44  RBC 3.64  WBC 7.06    10-18    148<H>  |  117<H>  |  36<H>  ----------------------------<  140<H>  3.0<L>   |  25  |  0.74    Ca    7.3<L>      18 Oct 2019 07:27                      BLOOD SMEAR INTERPRETATION:       RADIOLOGY & ADDITIONAL STUDIES:

## 2019-10-20 RX ORDER — POVIDONE-IODINE 5 %
1 AEROSOL (ML) TOPICAL DAILY
Refills: 0 | Status: DISCONTINUED | OUTPATIENT
Start: 2019-10-20 | End: 2019-10-23

## 2019-10-20 RX ADMIN — Medication 650 MILLIGRAM(S): at 13:08

## 2019-10-20 RX ADMIN — Medication 1 SPRAY(S): at 06:16

## 2019-10-20 RX ADMIN — Medication 650 MILLIGRAM(S): at 06:16

## 2019-10-20 RX ADMIN — SIMVASTATIN 10 MILLIGRAM(S): 20 TABLET, FILM COATED ORAL at 23:12

## 2019-10-20 RX ADMIN — Medication 650 MILLIGRAM(S): at 13:38

## 2019-10-20 RX ADMIN — MEMANTINE HYDROCHLORIDE 10 MILLIGRAM(S): 10 TABLET ORAL at 18:21

## 2019-10-20 RX ADMIN — Medication 650 MILLIGRAM(S): at 23:12

## 2019-10-20 RX ADMIN — ROBINUL 0.4 MILLIGRAM(S): 0.2 INJECTION INTRAMUSCULAR; INTRAVENOUS at 06:16

## 2019-10-20 RX ADMIN — Medication 1 APPLICATION(S): at 12:52

## 2019-10-20 RX ADMIN — SODIUM CHLORIDE 60 MILLILITER(S): 9 INJECTION, SOLUTION INTRAVENOUS at 06:17

## 2019-10-20 RX ADMIN — Medication 1 SPRAY(S): at 13:08

## 2019-10-20 RX ADMIN — MEMANTINE HYDROCHLORIDE 10 MILLIGRAM(S): 10 TABLET ORAL at 06:16

## 2019-10-20 RX ADMIN — HEPARIN SODIUM 5000 UNIT(S): 5000 INJECTION INTRAVENOUS; SUBCUTANEOUS at 18:21

## 2019-10-20 RX ADMIN — OXYCODONE HYDROCHLORIDE 5 MILLIGRAM(S): 5 TABLET ORAL at 06:20

## 2019-10-20 RX ADMIN — MEROPENEM 100 MILLIGRAM(S): 1 INJECTION INTRAVENOUS at 18:21

## 2019-10-20 RX ADMIN — Medication 1 SPRAY(S): at 23:12

## 2019-10-20 RX ADMIN — MEROPENEM 100 MILLIGRAM(S): 1 INJECTION INTRAVENOUS at 06:15

## 2019-10-20 RX ADMIN — Medication 10 MILLIEQUIVALENT(S): at 12:51

## 2019-10-20 RX ADMIN — HEPARIN SODIUM 5000 UNIT(S): 5000 INJECTION INTRAVENOUS; SUBCUTANEOUS at 06:16

## 2019-10-20 NOTE — PROGRESS NOTE ADULT - SUBJECTIVE AND OBJECTIVE BOX
Patient is a 89y old  Female who presents with a chief complaint of respiratory distress, fever (12 Oct 2019 16:04)      HPI:  89 year old female patient, bedbound, non verbal AAO x0 at baseline from [facility], with PMH significant for HTN, DM, HLD and dementia is sent from NH due to fever of 102F, congestion and oxygen saturation of 81%. On arrival, patient was noted to have rectal temperature of 105F, , O2 sat of 88%. She was placed on non-rebreather which improved SPO2 to 95%. In ED, it was noted that G tube was not draining and was erythematous no purulent discharge was noted. G-tube was replaced by ED provider. Her UA was negative, chest Xray showed: new left sided infiltrate at base.  As per charts, patient was being treated with zosyn for osteomyelitis of left foot for last 2 days. However, she continued to spike fever.     History was limited, patient's family was at bedside. Had detailed discussion about GOC, she is DNR/DNI, no pressor support or central line. (12 Oct 2019 12:37)    Pt. is seen bedside this AM by podiatry. Patient is no awake, alert, aware, nor oriented. Dressing clean and intact. Offloading device intact for b/l heels. left lower extremity contracted. No acute events overnight. Pt. groans during dressing change.     PMH: Dementia  HTN (hypertension)  Hyperlipidemia    Allergies: No Known Allergies    Medications: albumin human 25% IVPB 50 milliLiter(s) IV Intermittent every 6 hours  dextrose 5%. 1000 milliLiter(s) IV Continuous <Continuous>  heparin  Injectable 5000 Unit(s) SubCutaneous every 12 hours  metroNIDAZOLE  IVPB 500 milliGRAM(s) IV Intermittent every 8 hours    FH:  PSX: No significant past surgical history    SH: albumin human 25% IVPB 50 milliLiter(s) IV Intermittent every 6 hours  dextrose 5%. 1000 milliLiter(s) IV Continuous <Continuous>  heparin  Injectable 5000 Unit(s) SubCutaneous every 12 hours  metroNIDAZOLE  IVPB 500 milliGRAM(s) IV Intermittent every 8 hours        Vital Signs Last 24 Hrs  T(C): 36.6 (20 Oct 2019 08:08), Max: 36.7 (20 Oct 2019 00:03)  T(F): 97.8 (20 Oct 2019 08:08), Max: 98 (20 Oct 2019 00:03)  HR: 77 (20 Oct 2019 08:08) (77 - 92)  BP: 113/47 (20 Oct 2019 08:08) (110/53 - 117/60)  BP(mean): --  RR: 18 (20 Oct 2019 08:08) (16 - 18)  SpO2: 97% (20 Oct 2019 08:08) (97% - 99%)      LABS             NO new labs today                        9.7    7.94  )-----------( 58       ( 18 Oct 2019 07:27 )             31.7   10-18    148<H>  |  117<H>  |  36<H>  ----------------------------<  140<H>  3.0<L>   |  25  |  0.74    Ca    7.3<L>      18 Oct 2019 07:27                   WBC Count: 7.94 K/uL (10-18 @ 07:27)  WBC Count: 8.29 K/uL (10-17 @ 07:52)  WBC Count: 7.94 K/uL (10-18 @ 07:27)  WBC Count: 8.29 K/uL (10-17 @ 07:52)  WBC Count: 7.06 K/uL (10-16 @ 07:14)  WBC Count: 6.89 K/uL (10-15 @ 08:13)  WBC Count: 9.46 K/uL (10-14 @ 07:14)                                     <L< from: Xray Foot AP + Lateral, Left (10.12.19 @ 13:08) >      EXAM:  FOOT 2VIEWS                             PROCEDURE DATE:  10/12/2019          INTERPRETATION:  Radiographs of the left foot         CLINICAL INFORMATION:  Concern for osteomyelitis sepsis    TECHNIQUE: Vertebral AP and lateral views of the foot were obtained.    FINDINGS:   No prior similar studies are available for review.    The forefoot demonstrates no evidence of fracture. Normal alignment is   seen. Joint spaces are preserved. Sesamoids are intact.    The midfoot appears intact.    The hindfoot demonstrates no plantar calcaneal spur.    There is soft tissue ulceration seen at the level of the heel. There is   focal sclerosis of the posterior calcaneus and heterogeneous density of   the bony structures diffusely.    IMPRESSION: Diffuse osteopenia and heterogeneity in the density of the   bony structures. There is more focal increased density at the level of   the calcaneus where the soft tissue ulcer is seen. Vasculitis cannot be   excluded and recommend correlation with MR imaging of the foot       EUGENE EMERSON M.D.,ATTENDING RADIOLOGIST  This document has been electronically signed. Oct 12 2019  2:35PM    < end of copied text >    >      12 Oct 2019 14:45          PHYSICAL EXAM    LE Focused:    Vasc:  DP and PT pulses non-palpable b/l, +2 pitting edema noted b/l.  CFT delayed 5 seconds to digits.  Derm: Left heel ulcer measuring 5.0cm x 4.6cm x 0.5cm with red granular base and small area of fibrotic tissue. Probes to bone. No malodor, no purulence, no increase in warmth, minimal periwound erythema. No open lesions to the right lower extremity.   Neuro: unable to assess  MSK: Patient is in an overall contracted position        Cultures:  taken 10/12:   Culture - Surgical Swab (10.12.19 @ 22:28)    -  Gentamicin: S <=2    -  Levofloxacin: S <=2    -  Meropenem: S <=1    -  Piperacillin/Tazobactam: S <=8    -  Tobramycin: S <=2    -  Trimethoprim/Sulfamethoxazole: S <=2/38    -  Amikacin: S <=16    -  Amoxicillin/Clavulanic Acid: S <=8/4    -  Ampicillin: S <=8 These ampicillin results predict results for amoxicillin    -  Ampicillin/Sulbactam: S <=4/2 Enterobacter, Citrobacter, and Serratia may develop resistance during prolonged therapy (3-4 days)    -  Aztreonam: S <=4    -  Cefazolin: S <=2 Enterobacter, Citrobacter, and Serratia may develop resistance during prolonged therapy (3-4 days)    -  Cefepime: S <=2    -  Cefoxitin: S <=8    -  Ceftriaxone: S <=1 Enterobacter, Citrobacter, and Serratia may develop resistance during prolonged therapy    -  Ciprofloxacin: R >2    -  Ertapenem: S <=0.5    Specimen Source: .Surgical Swab    Culture Results:   Few Proteus mirabilis    Organism Identification: Proteus mirabilis    Organism: Proteus mirabilis    Method Type: KENTON        A: Left heel ulcer down to the level of bone with likely underlying osteomyelitis      P:  Patient evaluated, chart reviewed  Xrays reviewed- as above  Culture taken of left heel ulcer 10/12-reviewed  Wound dressed with betadine, 4x4 gauze, Carmen  Betadine ordered bedside  Recommend continued Heel offloading in bed  Recommend antibiotics per primary team or ID  COLLIN/PVR ordered  Wound care order: betadine, 4x4 gauze, Carmen to heel wound.  Patient is stable to discharge from podiatry standpoint   Discussed with attending Dr. Mcfadden

## 2019-10-20 NOTE — PROGRESS NOTE ADULT - SUBJECTIVE AND OBJECTIVE BOX
HPI:  89 year old female patient, bedbound, non verbal AAO x0 at baseline from [facility], with PMH significant for HTN, DM, HLD and dementia is sent from NH due to fever of 102F, congestion and oxygen saturation of 81%. On arrival, patient was noted to have rectal temperature of 105F, , O2 sat of 88%. She was placed on non-rebreather which improved SPO2 to 95%. In ED, it was noted that G tube was not draining and was erythematous no purulent discharge was noted. G-tube was replaced by ED provider. Her UA was negative, chest Xray showed: new left sided infiltrate at base.  As per charts, patient was being treated with zosyn for osteomyelitis of left foot for last 2 days. However, she continued to spike fever.     History was limited, patient's family was at bedside. Had detailed discussion about GOC, she is DNR/DNI, no pressor support or central line. (12 Oct 2019 12:37)      Patient is a 89y old  Female who presents with a chief complaint of respiratory distress, fever (20 Oct 2019 13:02)      INTERVAL HPI/OVERNIGHT EVENTS:  T(C): 36.6 (10-20-19 @ 08:08), Max: 36.7 (10-20-19 @ 00:03)  HR: 77 (10-20-19 @ 08:08) (77 - 92)  BP: 113/47 (10-20-19 @ 08:08) (110/53 - 117/60)  RR: 18 (10-20-19 @ 08:08) (16 - 18)  SpO2: 97% (10-20-19 @ 08:08) (97% - 99%)  Wt(kg): --  I&O's Summary    19 Oct 2019 07:01  -  20 Oct 2019 07:00  --------------------------------------------------------  IN: 0 mL / OUT: 2100 mL / NET: -2100 mL        REVIEW OF SYSTEMS: denies fever, chills, SOB, palpitations, chest pain, abdominal pain, nausea, vomitting, diarrhea, constipation, dizziness    MEDICATIONS  (STANDING):  acetaminophen    Suspension .. 650 milliGRAM(s) Oral every 8 hours  dextrose 5%. 1000 milliLiter(s) (60 mL/Hr) IV Continuous <Continuous>  dextrose 5%. 1000 milliLiter(s) (60 mL/Hr) IV Continuous <Continuous>  heparin  Injectable 5000 Unit(s) SubCutaneous every 12 hours  memantine 10 milliGRAM(s) Oral two times a day  meropenem  IVPB 500 milliGRAM(s) IV Intermittent every 12 hours  potassium chloride   Powder 10 milliEquivalent(s) Oral daily  povidone iodine 10% Solution 1 Application(s) Topical daily  simvastatin 10 milliGRAM(s) Oral at bedtime  sodium chloride 0.65% Nasal 1 Spray(s) Both Nostrils every 8 hours    MEDICATIONS  (PRN):  glycopyrrolate Injectable 0.4 milliGRAM(s) IV Push every 6 hours PRN secretions  oxyCODONE    IR 5 milliGRAM(s) Oral every 6 hours PRN Severe Pain (7 - 10) or dyspnea      PHYSICAL EXAM:  GENERAL: NAD, well-groomed, well-developed  HEAD:  Atraumatic, Normocephalic  EYES: EOMI, PERRLA, conjunctiva and sclera clear  ENMT: No tonsillar erythema, exudates, or enlargement; Moist mucous membranes, Good dentition, No lesions  NECK: Supple, No JVD, Normal thyroid  NERVOUS SYSTEM:  Alert & Oriented X3, Good concentration; Motor Strength 5/5 B/L upper and lower extremities; DTRs 2+ intact and symmetric  CHEST/LUNG: Clear to percussion bilaterally; No rales, rhonchi, wheezing, or rubs  HEART: Regular rate and rhythm; No murmurs, rubs, or gallops  ABDOMEN: Soft, Nontender, Nondistended; Bowel sounds present  EXTREMITIES:  2+ Peripheral Pulses, No clubbing, cyanosis, or edema  LYMPH: No lymphadenopathy noted  SKIN: No rashes or lesions  LABS:              CAPILLARY BLOOD GLUCOSE

## 2019-10-20 NOTE — PROGRESS NOTE ADULT - SUBJECTIVE AND OBJECTIVE BOX
Patient is a 89y Female with  hypernatremia  ALSO HAS  GAYLE  WHEN  SHE  WAS  SENT FROM NH  RENAL  FUNCTION IS  NORMAL  NOW   YESTERDAY  HER  S  NA   WAS BETTER   REMAINS  NON  VERBAL   PEG FEEDING IN PROGRESS    No Known Allergies    Hospital Medications:   MEDICATIONS  (STANDING):  acetaminophen    Suspension .. 650 milliGRAM(s) Oral every 8 hours  dextrose 5%. 1000 milliLiter(s) (60 mL/Hr) IV Continuous <Continuous>  dextrose 5%. 1000 milliLiter(s) (60 mL/Hr) IV Continuous <Continuous>  heparin  Injectable 5000 Unit(s) SubCutaneous every 12 hours  memantine 10 milliGRAM(s) Oral two times a day  meropenem  IVPB 500 milliGRAM(s) IV Intermittent every 12 hours  potassium chloride   Powder 10 milliEquivalent(s) Oral daily  povidone iodine 10% Solution 1 Application(s) Topical daily  simvastatin 10 milliGRAM(s) Oral at bedtime  sodium chloride 0.65% Nasal 1 Spray(s) Both Nostrils every 8 hours    REVIEW OF SYSTEMS:  CANT  BE  PREFORMED,  PT NON  VERBAL    VITALS:  T(F): 97.8 (10-20-19 @ 08:08), Max: 98 (10-20-19 @ 00:03)  HR: 77 (10-20-19 @ 08:08)  BP: 113/47 (10-20-19 @ 08:08)  RR: 18 (10-20-19 @ 08:08)  SpO2: 97% (10-20-19 @ 08:08)  Wt(kg): --    10-19 @ 07:01  -  10-20 @ 07:00  --------------------------------------------------------  IN: 0 mL / OUT: 2100 mL / NET: -2100 mL        PHYSICAL EXAM:  Constitutional: NAD  Neck: No JVD  Respiratory: FES  SCATTERED  RONCHI  BILAT  Cardiovascular: S1, S2, RRR  Gastrointestinal: BS+, soft, NT/ND, PEG  TUBE IN PLACE, FEEDING IN  PROGRESS  Extremities:  peripheral edema 1 +  Neurological: PT  NON  VERBAL  :  [facility]. IN PLACE ,  HAD 2100CC OUTPUT  LAST  24 HRS        LABS:        Creatinine Trend: 0.74 <--, 0.77 <--, 0.94 <--, 1.10 <--, 1.46 <--, 1.48 <--, 1.64 <--    Urine Studies:      RADIOLOGY & ADDITIONAL STUDIES:

## 2019-10-21 LAB
ANION GAP SERPL CALC-SCNC: 6 MMOL/L — SIGNIFICANT CHANGE UP (ref 5–17)
BUN SERPL-MCNC: 23 MG/DL — HIGH (ref 7–18)
CALCIUM SERPL-MCNC: 8.2 MG/DL — LOW (ref 8.4–10.5)
CHLORIDE SERPL-SCNC: 114 MMOL/L — HIGH (ref 96–108)
CO2 SERPL-SCNC: 25 MMOL/L — SIGNIFICANT CHANGE UP (ref 22–31)
CREAT SERPL-MCNC: 0.6 MG/DL — SIGNIFICANT CHANGE UP (ref 0.5–1.3)
GLUCOSE SERPL-MCNC: 98 MG/DL — SIGNIFICANT CHANGE UP (ref 70–99)
HCT VFR BLD CALC: 30.3 % — LOW (ref 34.5–45)
HGB BLD-MCNC: 9.2 G/DL — LOW (ref 11.5–15.5)
MCHC RBC-ENTMCNC: 28.4 PG — SIGNIFICANT CHANGE UP (ref 27–34)
MCHC RBC-ENTMCNC: 30.4 GM/DL — LOW (ref 32–36)
MCV RBC AUTO: 93.5 FL — SIGNIFICANT CHANGE UP (ref 80–100)
NRBC # BLD: 0 /100 WBCS — SIGNIFICANT CHANGE UP (ref 0–0)
PLATELET # BLD AUTO: 90 K/UL — LOW (ref 150–400)
POTASSIUM SERPL-MCNC: 4.8 MMOL/L — SIGNIFICANT CHANGE UP (ref 3.5–5.3)
POTASSIUM SERPL-SCNC: 4.8 MMOL/L — SIGNIFICANT CHANGE UP (ref 3.5–5.3)
RBC # BLD: 3.24 M/UL — LOW (ref 3.8–5.2)
RBC # FLD: 18.2 % — HIGH (ref 10.3–14.5)
SODIUM SERPL-SCNC: 145 MMOL/L — SIGNIFICANT CHANGE UP (ref 135–145)
WBC # BLD: 8.38 K/UL — SIGNIFICANT CHANGE UP (ref 3.8–10.5)
WBC # FLD AUTO: 8.38 K/UL — SIGNIFICANT CHANGE UP (ref 3.8–10.5)

## 2019-10-21 RX ADMIN — Medication 650 MILLIGRAM(S): at 00:00

## 2019-10-21 RX ADMIN — Medication 650 MILLIGRAM(S): at 05:50

## 2019-10-21 RX ADMIN — ROBINUL 0.4 MILLIGRAM(S): 0.2 INJECTION INTRAMUSCULAR; INTRAVENOUS at 16:19

## 2019-10-21 RX ADMIN — HEPARIN SODIUM 5000 UNIT(S): 5000 INJECTION INTRAVENOUS; SUBCUTANEOUS at 17:54

## 2019-10-21 RX ADMIN — Medication 1 APPLICATION(S): at 13:45

## 2019-10-21 RX ADMIN — MEMANTINE HYDROCHLORIDE 10 MILLIGRAM(S): 10 TABLET ORAL at 05:06

## 2019-10-21 RX ADMIN — Medication 10 MILLIEQUIVALENT(S): at 16:16

## 2019-10-21 RX ADMIN — MEROPENEM 100 MILLIGRAM(S): 1 INJECTION INTRAVENOUS at 05:06

## 2019-10-21 RX ADMIN — Medication 1 SPRAY(S): at 05:06

## 2019-10-21 RX ADMIN — Medication 650 MILLIGRAM(S): at 05:06

## 2019-10-21 RX ADMIN — Medication 650 MILLIGRAM(S): at 21:49

## 2019-10-21 RX ADMIN — Medication 650 MILLIGRAM(S): at 15:21

## 2019-10-21 RX ADMIN — Medication 650 MILLIGRAM(S): at 14:21

## 2019-10-21 RX ADMIN — SIMVASTATIN 10 MILLIGRAM(S): 20 TABLET, FILM COATED ORAL at 21:49

## 2019-10-21 RX ADMIN — MEROPENEM 100 MILLIGRAM(S): 1 INJECTION INTRAVENOUS at 17:54

## 2019-10-21 RX ADMIN — MEMANTINE HYDROCHLORIDE 10 MILLIGRAM(S): 10 TABLET ORAL at 17:54

## 2019-10-21 RX ADMIN — Medication 650 MILLIGRAM(S): at 22:43

## 2019-10-21 RX ADMIN — Medication 1 SPRAY(S): at 21:49

## 2019-10-21 RX ADMIN — HEPARIN SODIUM 5000 UNIT(S): 5000 INJECTION INTRAVENOUS; SUBCUTANEOUS at 05:06

## 2019-10-21 RX ADMIN — Medication 1 SPRAY(S): at 14:18

## 2019-10-21 NOTE — PROGRESS NOTE ADULT - SUBJECTIVE AND OBJECTIVE BOX
NP Note discussed with  Primary Attending    Patient is a 89y old  Female who presents with a chief complaint of respiratory distress, fever (21 Oct 2019 14:36)    HPI : HPI:  89 year old female patient, bedbound, non verbal AAO x0 at baseline from [facility], with PMH significant for HTN, DM, HLD and dementia is sent from NH due to fever of 102F, congestion and oxygen saturation of 81%.  Pts G tube was found to be malfunctioning upon arrival, replaced in the ED.  Pt admitted for LLL PNA.  Treating with IV antibiotics. Well responding on current treatment for pneumonia,  WBC WNL, afebrile, breathing well on O2 supplement.  Pt is hospice eligible, family decided not to treat for Osteomyelitis, but treating pneumonia now then focusing to make pt comfortable as possible. PEG tube feeding resumed and functioning well. Referral sent to Stephania for hospice care at [facility] and also, DORIS l[facility]pez to [facility] [place] for hospice care.       INTERVAL HPI/OVERNIGHT EVENTS: no new complaints    MEDICATIONS  (STANDING):  acetaminophen    Suspension .. 650 milliGRAM(s) Oral every 8 hours  dextrose 5%. 1000 milliLiter(s) (60 mL/Hr) IV Continuous <Continuous>  dextrose 5%. 1000 milliLiter(s) (60 mL/Hr) IV Continuous <Continuous>  heparin  Injectable 5000 Unit(s) SubCutaneous every 12 hours  memantine 10 milliGRAM(s) Oral two times a day  meropenem  IVPB 500 milliGRAM(s) IV Intermittent every 12 hours  potassium chloride   Powder 10 milliEquivalent(s) Oral daily  povidone iodine 10% Solution 1 Application(s) Topical daily  simvastatin 10 milliGRAM(s) Oral at bedtime  sodium chloride 0.65% Nasal 1 Spray(s) Both Nostrils every 8 hours    MEDICATIONS  (PRN):  glycopyrrolate Injectable 0.4 milliGRAM(s) IV Push every 6 hours PRN secretions  oxyCODONE    IR 5 milliGRAM(s) Oral every 6 hours PRN Severe Pain (7 - 10) or dyspnea      __________________________________________________  REVIEW OF SYSTEMS:    non verbal, unobtainable   ALL OTHER  ROS negative        Vital Signs Last 24 Hrs  T(C): 36.8 (21 Oct 2019 08:12), Max: 36.8 (21 Oct 2019 08:12)  T(F): 98.2 (21 Oct 2019 08:12), Max: 98.2 (21 Oct 2019 08:12)  HR: 81 (21 Oct 2019 08:12) (70 - 81)  BP: 140/57 (21 Oct 2019 08:12) (130/57 - 144/62)  BP(mean): --  RR: 17 (21 Oct 2019 08:12) (17 - 18)  SpO2: 98% (21 Oct 2019 08:12) (98% - 98%)    ________________________________________________  PHYSICAL EXAM:  GENERAL: NAD, oxygen supplement via N-C   HEENT: Normocephalic;  conjunctivae and sclerae clear; moist mucous membranes;   NECK : supple  CHEST/LUNG: Clear to auscultation bilaterally with good air entry   HEART: S1 S2  regular; no murmurs, gallops or rubs  ABDOMEN: Soft, Nontender, Nondistended; Bowel sounds present  EXTREMITIES: b/l feet- dreesing intact. c/w heel boots   SKIN: warm and dry; no rash  NERVOUS SYSTEM:  unrresponsive     _________________________________________________  LABS:                        9.2    8.38  )-----------( 90       ( 21 Oct 2019 06:55 )             30.3     10-21    145  |  114<H>  |  23<H>  ----------------------------<  98  4.8   |  25  |  0.60    Ca    8.2<L>      21 Oct 2019 06:55          CAPILLARY BLOOD GLUCOSE            RADIOLOGY & ADDITIONAL TESTS:    Imaging Personally Reviewed:  NO    Consultant(s) Notes Reviewed:   YES    Care Discussed with Consultants : Podiatry/ Nephrology/ palliative / GI     Plan of care was discussed with patient and /or primary care giver; all questions and concerns were addressed and care was aligned with patient's wishes.

## 2019-10-21 NOTE — PROGRESS NOTE ADULT - SUBJECTIVE AND OBJECTIVE BOX
Patient is a 89y old  Female who presents with a chief complaint of respiratory distress, fever (12 Oct 2019 16:04)      HPI:  89 year old female patient, bedbound, non verbal AAO x0 at baseline from [facility], with PMH significant for HTN, DM, HLD and dementia is sent from NH due to fever of 102F, congestion and oxygen saturation of 81%. On arrival, patient was noted to have rectal temperature of 105F, , O2 sat of 88%. She was placed on non-rebreather which improved SPO2 to 95%. In ED, it was noted that G tube was not draining and was erythematous no purulent discharge was noted. G-tube was replaced by ED provider. Her UA was negative, chest Xray showed: new left sided infiltrate at base.  As per charts, patient was being treated with zosyn for osteomyelitis of left foot for last 2 days. However, she continued to spike fever.     History was limited, patient's family was at bedside. Had detailed discussion about GOC, she is DNR/DNI, no pressor support or central line. (12 Oct 2019 12:37)    Pt. is seen bedside this AM by podiatry with Attending. Patient is no awake, alert, aware, nor oriented. Dressing clean and intact. Offloading device intact for b/l heels. Left lower extremity contracted. No acute events overnight. Pt. did not groans during dressing change today.    PMH: Dementia  HTN (hypertension)  Hyperlipidemia    Allergies: No Known Allergies    Medications: albumin human 25% IVPB 50 milliLiter(s) IV Intermittent every 6 hours  dextrose 5%. 1000 milliLiter(s) IV Continuous <Continuous>  heparin  Injectable 5000 Unit(s) SubCutaneous every 12 hours  metroNIDAZOLE  IVPB 500 milliGRAM(s) IV Intermittent every 8 hours    FH:  PSX: No significant past surgical history    SH: albumin human 25% IVPB 50 milliLiter(s) IV Intermittent every 6 hours  dextrose 5%. 1000 milliLiter(s) IV Continuous <Continuous>  heparin  Injectable 5000 Unit(s) SubCutaneous every 12 hours  metroNIDAZOLE  IVPB 500 milliGRAM(s) IV Intermittent every 8 hours          Vital Signs Last 24 Hrs  T(C): 36.8 (21 Oct 2019 08:12), Max: 36.8 (21 Oct 2019 08:12)  T(F): 98.2 (21 Oct 2019 08:12), Max: 98.2 (21 Oct 2019 08:12)  HR: 81 (21 Oct 2019 08:12) (70 - 81)  BP: 140/57 (21 Oct 2019 08:12) (130/57 - 144/62)  BP(mean): --  RR: 17 (21 Oct 2019 08:12) (17 - 18)  SpO2: 98% (21 Oct 2019 08:12) (98% - 98%)      LABS                                   9.2    8.38  )-----------( 90       ( 21 Oct 2019 06:55 )             30.3                10-21    145  |  114<H>  |  23<H>  ----------------------------<  98  4.8   |  25  |  0.60    Ca    8.2<L>      21 Oct 2019 06:55                    WBC Count: 8.38 K/uL (10-21 @ 06:55)  WBC Count: 7.94 K/uL (10-18 @ 07:27)  WBC Count: 8.29 K/uL (10-17 @ 07:52)  WBC Count: 6.89 K/uL (10-15 @ 08:13)  WBC Count: 9.46 K/uL (10-14 @ 07:14)                                     <L< from: Xray Foot AP + Lateral, Left (10.12.19 @ 13:08) >      EXAM:  FOOT 2VIEWS LT                            PROCEDURE DATE:  10/12/2019          INTERPRETATION:  Radiographs of the left foot         CLINICAL INFORMATION:  Concern for osteomyelitis sepsis    TECHNIQUE: Vertebral AP and lateral views of the foot were obtained.    FINDINGS:   No prior similar studies are available for review.    The forefoot demonstrates no evidence of fracture. Normal alignment is   seen. Joint spaces are preserved. Sesamoids are intact.    The midfoot appears intact.    The hindfoot demonstrates no plantar calcaneal spur.    There is soft tissue ulceration seen at the level of the heel. There is   focal sclerosis of the posterior calcaneus and heterogeneous density of   the bony structures diffusely.    IMPRESSION: Diffuse osteopenia and heterogeneity in the density of the   bony structures. There is more focal increased density at the level of   the calcaneus where the soft tissue ulcer is seen. Vasculitis cannot be   excluded and recommend correlation with MR imaging of the foot       EUGENE EMERSON M.D.,ATTENDING RADIOLOGIST  This document has been electronically signed. Oct 12 2019  2:35PM    < end of copied text >    >      12 Oct 2019 14:45          PHYSICAL EXAM    LE Focused:    Vasc:  DP and PT pulses non-palpable b/l, +2 pitting edema noted b/l.  CFT delayed 5 seconds to digits.  Derm: Left heel ulcer measuring 5.0cm x 4.6cm x 0.5cm with red granular base and small area of fibrotic tissue. Probes to bone. No malodor, no purulence, no increase in warmth, minimal periwound erythema. some serosanguinous drainage. No open lesions to the right lower extremity.   Neuro: unable to assess  MSK: Patient is in an overall contracted position        Cultures:  taken 10/12:   Culture - Surgical Swab (10.12.19 @ 22:28)    -  Gentamicin: S <=2    -  Levofloxacin: S <=2    -  Meropenem: S <=1    -  Piperacillin/Tazobactam: S <=8    -  Tobramycin: S <=2    -  Trimethoprim/Sulfamethoxazole: S <=2/38    -  Amikacin: S <=16    -  Amoxicillin/Clavulanic Acid: S <=8/4    -  Ampicillin: S <=8 These ampicillin results predict results for amoxicillin    -  Ampicillin/Sulbactam: S <=4/2 Enterobacter, Citrobacter, and Serratia may develop resistance during prolonged therapy (3-4 days)    -  Aztreonam: S <=4    -  Cefazolin: S <=2 Enterobacter, Citrobacter, and Serratia may develop resistance during prolonged therapy (3-4 days)    -  Cefepime: S <=2    -  Cefoxitin: S <=8    -  Ceftriaxone: S <=1 Enterobacter, Citrobacter, and Serratia may develop resistance during prolonged therapy    -  Ciprofloxacin: R >2    -  Ertapenem: S <=0.5    Specimen Source: .Surgical Swab    Culture Results:   Few Proteus mirabilis    Organism Identification: Proteus mirabilis    Organism: Proteus mirabilis    Method Type: KENTON        A: Left heel ulcer down to the level of bone with likely underlying osteomyelitis      P:  Patient evaluated, chart reviewed  Xrays reviewed- as above  Culture taken of left heel ulcer 10/12-reviewed  Wound dressed with betadine, 4x4 gauze, Carmen  Betadine ordered bedside  Recommend continued Heel offloading in bed  Recommend antibiotics per primary team or ID  COLLIN/PVR ordered  Wound care order placed: betadine, 4x4 gauze, Carmen to heel wound. Daily dressing change.   Patient is stable to discharge from podiatry standpoint   Discussed and seen with attending Dr. Mcfadden

## 2019-10-21 NOTE — PROGRESS NOTE ADULT - PROBLEM SELECTOR PLAN 9
waiting for acceptance from facilities ( [facility])

## 2019-10-21 NOTE — PROGRESS NOTE ADULT - SUBJECTIVE AND OBJECTIVE BOX
HPI:  89 year old female patient, bedbound, non verbal AAO x0 at baseline from [facility], with PMH significant for HTN, DM, HLD and dementia is sent from NH due to fever of 102F, congestion and oxygen saturation of 81%. On arrival, patient was noted to have rectal temperature of 105F, , O2 sat of 88%. She was placed on non-rebreather which improved SPO2 to 95%. In ED, it was noted that G tube was not draining and was erythematous no purulent discharge was noted. G-tube was replaced by ED provider. Her UA was negative, chest Xray showed: new left sided infiltrate at base.  As per charts, patient was being treated with zosyn for osteomyelitis of left foot for last 2 days. However, she continued to spike fever.     History was limited, patient's family was at bedside. Had detailed discussion about GOC, she is DNR/DNI, no pressor support or central line. (12 Oct 2019 12:37)      Patient is a 89y old  Female who presents with a chief complaint of respiratory distress, fever (20 Oct 2019 13:57)      INTERVAL HPI/OVERNIGHT EVENTS:  T(C): 36.8 (10-21-19 @ 08:12), Max: 36.8 (10-21-19 @ 08:12)  HR: 81 (10-21-19 @ 08:12) (70 - 81)  BP: 140/57 (10-21-19 @ 08:12) (130/57 - 144/62)  RR: 17 (10-21-19 @ 08:12) (17 - 18)  SpO2: 98% (10-21-19 @ 08:12) (98% - 98%)  Wt(kg): --  I&O's Summary    20 Oct 2019 07:01  -  21 Oct 2019 07:00  --------------------------------------------------------  IN: 650 mL / OUT: 2120 mL / NET: -1470 mL        REVIEW OF SYSTEMS: denies fever, chills, SOB, palpitations, chest pain, abdominal pain, nausea, vomitting, diarrhea, constipation, dizziness    MEDICATIONS  (STANDING):  acetaminophen    Suspension .. 650 milliGRAM(s) Oral every 8 hours  dextrose 5%. 1000 milliLiter(s) (60 mL/Hr) IV Continuous <Continuous>  dextrose 5%. 1000 milliLiter(s) (60 mL/Hr) IV Continuous <Continuous>  heparin  Injectable 5000 Unit(s) SubCutaneous every 12 hours  memantine 10 milliGRAM(s) Oral two times a day  meropenem  IVPB 500 milliGRAM(s) IV Intermittent every 12 hours  potassium chloride   Powder 10 milliEquivalent(s) Oral daily  povidone iodine 10% Solution 1 Application(s) Topical daily  simvastatin 10 milliGRAM(s) Oral at bedtime  sodium chloride 0.65% Nasal 1 Spray(s) Both Nostrils every 8 hours    MEDICATIONS  (PRN):  glycopyrrolate Injectable 0.4 milliGRAM(s) IV Push every 6 hours PRN secretions  oxyCODONE    IR 5 milliGRAM(s) Oral every 6 hours PRN Severe Pain (7 - 10) or dyspnea      PHYSICAL EXAM:  GENERAL: NAD, well-groomed, well-developed  HEAD:  Atraumatic, Normocephalic  EYES: EOMI, PERRLA, conjunctiva and sclera clear  ENMT: No tonsillar erythema, exudates, or enlargement; Moist mucous membranes, Good dentition, No lesions  NECK: Supple, No JVD, Normal thyroid  NERVOUS SYSTEM:  Alert & Oriented X3, Good concentration; Motor Strength 5/5 B/L upper and lower extremities; DTRs 2+ intact and symmetric  CHEST/LUNG: Clear to percussion bilaterally; No rales, rhonchi, wheezing, or rubs  HEART: Regular rate and rhythm; No murmurs, rubs, or gallops  ABDOMEN: Soft, Nontender, Nondistended; Bowel sounds present  EXTREMITIES:  2+ Peripheral Pulses, No clubbing, cyanosis, or edema  LYMPH: No lymphadenopathy noted  SKIN: No rashes or lesions  LABS:                        9.2    8.38  )-----------( 90       ( 21 Oct 2019 06:55 )             30.3     10-21    145  |  114<H>  |  23<H>  ----------------------------<  98  4.8   |  25  |  0.60    Ca    8.2<L>      21 Oct 2019 06:55          CAPILLARY BLOOD GLUCOSE

## 2019-10-21 NOTE — PROGRESS NOTE ADULT - SUBJECTIVE AND OBJECTIVE BOX
[facility] : Progress Note :: [phone], (office [phone]),   Dr Madrigal / Dr Garcia / Dr Stafford / Dr Anaya / Dr Janelle GALVAN / Dr Barron / Dr Melgoza / Dr Joe martines  _____________________________________________________________________________________________  no events overnight    No Known Allergies    Hospital Medications:   MEDICATIONS  (STANDING):  acetaminophen    Suspension .. 650 milliGRAM(s) Oral every 8 hours  dextrose 5%. 1000 milliLiter(s) (60 mL/Hr) IV Continuous <Continuous>  dextrose 5%. 1000 milliLiter(s) (60 mL/Hr) IV Continuous <Continuous>  heparin  Injectable 5000 Unit(s) SubCutaneous every 12 hours  memantine 10 milliGRAM(s) Oral two times a day  meropenem  IVPB 500 milliGRAM(s) IV Intermittent every 12 hours  potassium chloride   Powder 10 milliEquivalent(s) Oral daily  povidone iodine 10% Solution 1 Application(s) Topical daily  simvastatin 10 milliGRAM(s) Oral at bedtime  sodium chloride 0.65% Nasal 1 Spray(s) Both Nostrils every 8 hours        VITALS:  T(F): 98.2 (10-21-19 @ 08:12), Max: 98.2 (10-21-19 @ 08:12)  HR: 81 (10-21-19 @ 08:12)  BP: 140/57 (10-21-19 @ 08:12)  RR: 17 (10-21-19 @ 08:12)  SpO2: 98% (10-21-19 @ 08:12)  Wt(kg): --    10-20 @ 07:01  -  10-21 @ 07:00  --------------------------------------------------------  IN: 650 mL / OUT: 2120 mL / NET: -1470 mL        PHYSICAL EXAM:  Constitutional: NAD  HEENT: anicteric sclera, oropharynx clear, MMM  Neck: No JVD  Respiratory: CTAB, no wheezes, rales or rhonchi  Cardiovascular: S1, S2, RRR  Gastrointestinal: BS+, soft, NT/ND. PEG tube in situ  Extremities:  No peripheral edema  :  jackson in place  Skin: No rashes      LABS:  10-21    145  |  114<H>  |  23<H>  ----------------------------<  98  4.8   |  25  |  0.60    Ca    8.2<L>      21 Oct 2019 06:55      Creatinine Trend: 0.60 <--, 0.74 <--, 0.77 <--, 0.94 <--, 1.10 <--                        9.2    8.38  )-----------( 90       ( 21 Oct 2019 06:55 )             30.3     Urine Studies:      RADIOLOGY & ADDITIONAL STUDIES:

## 2019-10-22 LAB
ANION GAP SERPL CALC-SCNC: 5 MMOL/L — SIGNIFICANT CHANGE UP (ref 5–17)
BUN SERPL-MCNC: 20 MG/DL — HIGH (ref 7–18)
CALCIUM SERPL-MCNC: 7.9 MG/DL — LOW (ref 8.4–10.5)
CHLORIDE SERPL-SCNC: 111 MMOL/L — HIGH (ref 96–108)
CO2 SERPL-SCNC: 26 MMOL/L — SIGNIFICANT CHANGE UP (ref 22–31)
CREAT SERPL-MCNC: 0.63 MG/DL — SIGNIFICANT CHANGE UP (ref 0.5–1.3)
GLUCOSE SERPL-MCNC: 90 MG/DL — SIGNIFICANT CHANGE UP (ref 70–99)
HCT VFR BLD CALC: 30.3 % — LOW (ref 34.5–45)
HGB BLD-MCNC: 9.3 G/DL — LOW (ref 11.5–15.5)
MCHC RBC-ENTMCNC: 28.4 PG — SIGNIFICANT CHANGE UP (ref 27–34)
MCHC RBC-ENTMCNC: 30.7 GM/DL — LOW (ref 32–36)
MCV RBC AUTO: 92.4 FL — SIGNIFICANT CHANGE UP (ref 80–100)
NRBC # BLD: 0 /100 WBCS — SIGNIFICANT CHANGE UP (ref 0–0)
PLATELET # BLD AUTO: 109 K/UL — LOW (ref 150–400)
POTASSIUM SERPL-MCNC: 4.6 MMOL/L — SIGNIFICANT CHANGE UP (ref 3.5–5.3)
POTASSIUM SERPL-SCNC: 4.6 MMOL/L — SIGNIFICANT CHANGE UP (ref 3.5–5.3)
RBC # BLD: 3.28 M/UL — LOW (ref 3.8–5.2)
RBC # FLD: 17.9 % — HIGH (ref 10.3–14.5)
SODIUM SERPL-SCNC: 142 MMOL/L — SIGNIFICANT CHANGE UP (ref 135–145)
WBC # BLD: 9.53 K/UL — SIGNIFICANT CHANGE UP (ref 3.8–10.5)
WBC # FLD AUTO: 9.53 K/UL — SIGNIFICANT CHANGE UP (ref 3.8–10.5)

## 2019-10-22 RX ORDER — DOCUSATE SODIUM 100 MG
15 CAPSULE ORAL
Qty: 0 | Refills: 0 | DISCHARGE

## 2019-10-22 RX ORDER — ACETAMINOPHEN 500 MG
20.31 TABLET ORAL
Qty: 0 | Refills: 0 | DISCHARGE
Start: 2019-10-22

## 2019-10-22 RX ORDER — PIPERACILLIN AND TAZOBACTAM 4; .5 G/20ML; G/20ML
1 INJECTION, POWDER, LYOPHILIZED, FOR SOLUTION INTRAVENOUS
Qty: 0 | Refills: 0 | DISCHARGE

## 2019-10-22 RX ORDER — MEMANTINE HYDROCHLORIDE 10 MG/1
1 TABLET ORAL
Qty: 0 | Refills: 0 | DISCHARGE

## 2019-10-22 RX ORDER — MEROPENEM 1 G/30ML
500 INJECTION INTRAVENOUS
Qty: 0 | Refills: 0 | DISCHARGE
Start: 2019-10-22 | End: 2019-10-26

## 2019-10-22 RX ORDER — CITALOPRAM 10 MG/1
0.5 TABLET, FILM COATED ORAL
Qty: 0 | Refills: 0 | DISCHARGE

## 2019-10-22 RX ORDER — FERROUS SULFATE 325(65) MG
5 TABLET ORAL
Qty: 0 | Refills: 0 | DISCHARGE

## 2019-10-22 RX ORDER — POVIDONE-IODINE 5 %
1 AEROSOL (ML) TOPICAL
Qty: 0 | Refills: 0 | DISCHARGE
Start: 2019-10-22

## 2019-10-22 RX ORDER — IBUPROFEN 200 MG
1 TABLET ORAL
Qty: 0 | Refills: 0 | DISCHARGE

## 2019-10-22 RX ORDER — OXYCODONE HYDROCHLORIDE 5 MG/1
1 TABLET ORAL
Qty: 0 | Refills: 0 | DISCHARGE
Start: 2019-10-22

## 2019-10-22 RX ORDER — OMEPRAZOLE 10 MG/1
1 CAPSULE, DELAYED RELEASE ORAL
Qty: 0 | Refills: 0 | DISCHARGE

## 2019-10-22 RX ORDER — ROBINUL 0.2 MG/ML
0 INJECTION INTRAMUSCULAR; INTRAVENOUS
Qty: 0 | Refills: 0 | DISCHARGE
Start: 2019-10-22

## 2019-10-22 RX ADMIN — SIMVASTATIN 10 MILLIGRAM(S): 20 TABLET, FILM COATED ORAL at 21:37

## 2019-10-22 RX ADMIN — Medication 1 APPLICATION(S): at 11:48

## 2019-10-22 RX ADMIN — MEROPENEM 100 MILLIGRAM(S): 1 INJECTION INTRAVENOUS at 06:14

## 2019-10-22 RX ADMIN — Medication 10 MILLIEQUIVALENT(S): at 11:24

## 2019-10-22 RX ADMIN — Medication 650 MILLIGRAM(S): at 06:14

## 2019-10-22 RX ADMIN — HEPARIN SODIUM 5000 UNIT(S): 5000 INJECTION INTRAVENOUS; SUBCUTANEOUS at 17:38

## 2019-10-22 RX ADMIN — Medication 1 SPRAY(S): at 13:54

## 2019-10-22 RX ADMIN — HEPARIN SODIUM 5000 UNIT(S): 5000 INJECTION INTRAVENOUS; SUBCUTANEOUS at 06:14

## 2019-10-22 RX ADMIN — Medication 1 SPRAY(S): at 06:14

## 2019-10-22 RX ADMIN — MEROPENEM 100 MILLIGRAM(S): 1 INJECTION INTRAVENOUS at 17:37

## 2019-10-22 RX ADMIN — ROBINUL 0.4 MILLIGRAM(S): 0.2 INJECTION INTRAMUSCULAR; INTRAVENOUS at 17:38

## 2019-10-22 RX ADMIN — Medication 650 MILLIGRAM(S): at 13:53

## 2019-10-22 RX ADMIN — Medication 650 MILLIGRAM(S): at 14:34

## 2019-10-22 RX ADMIN — Medication 650 MILLIGRAM(S): at 07:28

## 2019-10-22 RX ADMIN — Medication 650 MILLIGRAM(S): at 22:40

## 2019-10-22 RX ADMIN — MEMANTINE HYDROCHLORIDE 10 MILLIGRAM(S): 10 TABLET ORAL at 06:14

## 2019-10-22 RX ADMIN — Medication 1 SPRAY(S): at 21:37

## 2019-10-22 RX ADMIN — Medication 650 MILLIGRAM(S): at 21:40

## 2019-10-22 NOTE — PROGRESS NOTE ADULT - PROBLEM SELECTOR PLAN 6
came in with albumin 1.8 , muscle wasting to b/l lower extremities   on PEG tube feeding -- ABD xray done in 10.16, resumed feeding and tolerating well.
In conjunction with YVONNE Olson, met with patient's son and daughter at bedside regarding further goals of care. They expressed concern over infections being completely treated. Readdressed trajectory of advanced dementia, ongoing risk of further infections and rehospitalizations. They verbalized understanding. Daughter asked, "so you think what she needs is hospice?" Hospice philosophy discussed, advised patient is appropriate for hospice at this time. She expressed that she already had been looking into it and would like referral made for [facility] for [facility] once patient medically stable. Her brother is in agreement. Support provided. SW referral made.
Pt takes Namenda 10 mg BID at home  Start home regimen
needs assist for all ADLs due to advanced dementia  continue supportive care.
advanced dementia   AO x O  comfort measure only
Spoke with patient's son at bedside, referral made to [facility] and they have provided SW with additional options as well. Addressed questions re: palliative care vs hospice facilities. Support provided.

## 2019-10-22 NOTE — PROGRESS NOTE ADULT - SUBJECTIVE AND OBJECTIVE BOX
HPI:  89 year old female patient, bedbound, non verbal AAO x0 at baseline from [facility], with PMH significant for HTN, DM, HLD and dementia is sent from NH due to fever of 102F, congestion and oxygen saturation of 81%. On arrival, patient was noted to have rectal temperature of 105F, , O2 sat of 88%. She was placed on non-rebreather which improved SPO2 to 95%. In ED, it was noted that G tube was not draining and was erythematous no purulent discharge was noted. G-tube was replaced by ED provider. Her UA was negative, chest Xray showed: new left sided infiltrate at base.  As per charts, patient was being treated with zosyn for osteomyelitis of left foot for last 2 days. However, she continued to spike fever.     History was limited, patient's family was at bedside. Had detailed discussion about GOC, she is DNR/DNI, no pressor support or central line. (12 Oct 2019 12:37)      Patient is a 89y old  Female who presents with a chief complaint of respiratory distress, fever (22 Oct 2019 10:39)      INTERVAL HPI/OVERNIGHT EVENTS:  T(C): 36.5 (10-22-19 @ 12:19), Max: 36.8 (10-21-19 @ 23:59)  HR: 70 (10-22-19 @ 12:19) (70 - 86)  BP: 148/66 (10-22-19 @ 12:19) (119/49 - 148/66)  RR: 17 (10-22-19 @ 12:19) (17 - 18)  SpO2: 98% (10-22-19 @ 12:19) (98% - 99%)  Wt(kg): --  I&O's Summary    21 Oct 2019 07:01  -  22 Oct 2019 07:00  --------------------------------------------------------  IN: 0 mL / OUT: 2200 mL / NET: -2200 mL        REVIEW OF SYSTEMS: denies fever, chills, SOB, palpitations, chest pain, abdominal pain, nausea, vomitting, diarrhea, constipation, dizziness    MEDICATIONS  (STANDING):  acetaminophen    Suspension .. 650 milliGRAM(s) Oral every 8 hours  dextrose 5%. 1000 milliLiter(s) (60 mL/Hr) IV Continuous <Continuous>  dextrose 5%. 1000 milliLiter(s) (60 mL/Hr) IV Continuous <Continuous>  heparin  Injectable 5000 Unit(s) SubCutaneous every 12 hours  memantine 10 milliGRAM(s) Oral two times a day  meropenem  IVPB 500 milliGRAM(s) IV Intermittent every 12 hours  potassium chloride   Powder 10 milliEquivalent(s) Oral daily  povidone iodine 10% Solution 1 Application(s) Topical daily  simvastatin 10 milliGRAM(s) Oral at bedtime  sodium chloride 0.65% Nasal 1 Spray(s) Both Nostrils every 8 hours    MEDICATIONS  (PRN):  glycopyrrolate Injectable 0.4 milliGRAM(s) IV Push every 6 hours PRN secretions  oxyCODONE    IR 5 milliGRAM(s) Oral every 6 hours PRN Severe Pain (7 - 10) or dyspnea      PHYSICAL EXAM:  GENERAL: NAD, well-groomed, well-developed  HEAD:  Atraumatic, Normocephalic  EYES: EOMI, PERRLA, conjunctiva and sclera clear  ENMT: No tonsillar erythema, exudates, or enlargement; Moist mucous membranes, Good dentition, No lesions  NECK: Supple, No JVD, Normal thyroid  NERVOUS SYSTEM:  Alert & Oriented X3, Good concentration; Motor Strength 5/5 B/L upper and lower extremities; DTRs 2+ intact and symmetric  CHEST/LUNG: Clear to percussion bilaterally; No rales, rhonchi, wheezing, or rubs  HEART: Regular rate and rhythm; No murmurs, rubs, or gallops  ABDOMEN: Soft, Nontender, Nondistended; Bowel sounds present  EXTREMITIES:  2+ Peripheral Pulses, No clubbing, cyanosis, or edema  LYMPH: No lymphadenopathy noted  SKIN: No rashes or lesions  LABS:                        9.3    9.53  )-----------( 109      ( 22 Oct 2019 08:18 )             30.3     10-22    142  |  111<H>  |  20<H>  ----------------------------<  90  4.6   |  26  |  0.63    Ca    7.9<L>      22 Oct 2019 08:18          CAPILLARY BLOOD GLUCOSE

## 2019-10-22 NOTE — PROGRESS NOTE ADULT - PROBLEM SELECTOR PLAN 3
2/2 advanced dementia, acute infection/PNA, has PEG, TF as tolerated. Has skin failure
BUN/Cr 105/2.06 (trending down)  Continue with [facility]  Dr. Madrigal (Nephrology) following
BUN/Cr 57/0.94 (trending down)  Continue with [facility]  Dr. Madrigal (Nephrology) following
BUN/Cr 75/1.1 (trending down)  Continue with [facility]  Dr. Madrigal (Nephrology) following
resolved- s/p D5W IVF   monitor BMP
needs assist for all ADLs due to advanced dementia  continue supportive care
2/2 advanced dementia, acute infection/PNA, has PEG, TF as tolerated. Has skin failure
improving PLT   continue heparin for DVT ppx  monitor CBC.

## 2019-10-22 NOTE — PROGRESS NOTE ADULT - PROBLEM SELECTOR PROBLEM 8
Need for prophylactic measure
Goals of care, counseling/discussion
Hyperlipidemia
Need for prophylactic measure

## 2019-10-22 NOTE — PROGRESS NOTE ADULT - PROBLEM SELECTOR PLAN 8
c/w heparin sc for DVT ppx
: DNR /DNI on [facility]  palliative care team is following - had a meeting with daughter Ms. smart and son Mr Rosa last Thursday   ---- pt is hospice eligible, referral made to several facilities  as both of them agreed on hospice care.
Lipids controlled  Pt takes simvastatin 10 mg @ HS at home  Start home regimen
c/w heparin  sc for DVT ppx

## 2019-10-22 NOTE — PROGRESS NOTE ADULT - PROBLEM SELECTOR PROBLEM 6
Protein calorie malnutrition
Encounter for palliative care
Dementia
Functional quadriplegia
Dementia
Encounter for palliative care

## 2019-10-22 NOTE — PROGRESS NOTE ADULT - PROBLEM SELECTOR PROBLEM 3
GAYLE (acute kidney injury)
Hypernatremia
Functional quadriplegia
Protein calorie malnutrition
Protein calorie malnutrition
Thrombocytopenia

## 2019-10-22 NOTE — PROGRESS NOTE ADULT - PROBLEM SELECTOR PLAN 2
Continue PEG feeds as tolerated   STOMA site care
Na 151  Continue with free water  Follow up BMP in AM    Dr Madrigal (Nephrology) following
Na 152  Continue with free water  Follow up BMP in AM    Dr Madrigal (Nephrology) following
Na 156  Continue with free water  Follow up BMP in AM    Dr Madrigal (Nephrology) following
likely OM but long term IV ABT tx's risks outweigh benefits.   c/w  meropenem for 14 days for pneumonia   podiatry is following - no bone biopsy since family agreed on hospice care now    -- continue local wound care.
on antibiotics
platelet is trending up  this suggest the thrombocytopenia is caused by infection
stable at 44  probably due to sepsis  but she may have underlying marrow issue in view of the giant platelet
Continue PEG feeds as tolerated   STOMA site care
as above
as above
improving. cont free water. RPT BMP
stable cont free water. RPT BMP
likely OM but long term IV ABT tx's risks outweigh benefits.   will continue meropenem for 14 days for pneumonia   podiatry is following - no bone biopsy since family agreed on hospice care now    -- continue local wound care
2/2 advanced dementia, dependent on all ADLs, freq positioning.
2/2 advanced dementia, dependent on all ADLs, freq positioning.
likely OM but long term IV ABT tx's risks outweigh benefits.   c/w  meropenem for 14 days for pneumonia   podiatry is following - no bone biopsy since family agreed on hospice care now    -- continue local wound care.

## 2019-10-22 NOTE — PROGRESS NOTE ADULT - PROBLEM SELECTOR PROBLEM 2
Feeding tube dysfunction
Foot ulcer
Hypernatremia
Left lower lobe pneumonia
Thrombocytopenia
Thrombocytopenia
Feeding tube dysfunction
Hypernatremia
Foot ulcer
Functional quadriplegia
Functional quadriplegia
Foot ulcer

## 2019-10-22 NOTE — PROGRESS NOTE ADULT - PROBLEM SELECTOR PLAN 4
ID and podiatry following. Local wound care. ID does not recommend treatment given risk of morbidity with age and bedbound status.
Replaced in ED  GI to confirm placement  Dr. Martinez (GI) consulted
Replaced in ED  Tolerating feedings
Replaced in ED  Tolerating feedings
improving PLT 90 - continue heparin for DVT ppx  monitor CBC
likely due to GAYLE -- GAYLE improving from admission ( BUN /Cr - 122/2.06 -> 44/0.77)  Nephro Dr. Madrigal is following -- c/w free water 300cc q 4hours and D5W @60cc/ hr x 12 hours started  by Dr. Madrigal  BMP in AM
ID and podiatry following. Local wound care. ID does not recommend treatment given risk of morbidity with age and bedbound status.
needs assist for all ADLs due to advanced dementia  continue supportive care.

## 2019-10-22 NOTE — PROGRESS NOTE ADULT - PROBLEM SELECTOR PLAN 1
Aspiration precautions
WBC 6.89  VSS  Afebrile  Blood cultures NGTD   Continue with Meropenem (day 2)   Dr. Rick (ID) following
WBC 7.06  VSS  Afebrile  Blood cultures NGTD   Continue with Meropenem (day 3)   Dr. Rick (ID) following
WBC 9.46  RR 19-20, vs otherwise stable  Afebrile  Blood cultures NGTD   Continue with Meropenem (day 1)   Patient's overall prognosis remains poor, palliative care consulted.   Dr. Rick (ID) following
fever is down  on antibiotics  ?pneumonia or OM from foot
improving  afebrile, no leukocytosis, no cough  c/w meropenem D7 /14   blood culture/ urine culture -no growth.
on antibiotics
platelet 50 now  this indicates infection begins to be under control  this support the drop of platelet is due to infection
Aspiration precautions
associated hypernatremia. Cont free water 300 Q4  start low dose D5W @60cc/hr for 12 hrs  hypokalemia repleted  F/U  BMP in AM
associated hypernatremia. Suggest increase with free water 250>>350 Q4  can also start low dose D5W @60cc/hr for 12 hrs  F/U  BMP
associated hypernatremia. both improving with free water.  cont jackson and free water
associated hypernatremia. both improving with free water.  continue  free water  RPT BMP in AM
improving  afebrile, no leukocytosis, no cough  c/w meropenem D4 /14   blood culture/ urine culture -no growth
2/2 decubs, continue local wound care.  -on acetaminophen 650 mg q8h  -add oxycodone 5 mg via PEG q4 hr prn breakthrough pain  -bowel regimen on opioids
2/2 decubs, continue local wound care.  -on acetaminophen 650 mg q8h  -add oxycodone 5 mg via PEG q4 hr prn breakthrough pain  -bowel regimen on opioids
improving  afebrile, no leukocytosis, no cough  c/w meropenem D10 /14   blood culture/ urine culture -no growth.

## 2019-10-22 NOTE — PROGRESS NOTE ADULT - PROBLEM SELECTOR PROBLEM 1
Dementia
Osteomyelitis
PNA (pneumonia)
Pneumonia
Severe sepsis
Thrombocytopenia
Dementia
GAYLE (acute kidney injury)
PNA (pneumonia)
Pain
Pain
PNA (pneumonia)

## 2019-10-22 NOTE — PROGRESS NOTE ADULT - SUBJECTIVE AND OBJECTIVE BOX
NP Note discussed with  Primary Attending    Patient is a 89y old  Female who presents with a chief complaint of respiratory distress, fever (21 Oct 2019 14:44)    HPI : HPI:  89 year old female patient, bedbound, non verbal AAO x0 at baseline from [facility], with PMH significant for HTN, DM, HLD and dementia is sent from NH due to fever of 102F, congestion and oxygen saturation of 81%.  Pts G tube was found to be malfunctioning upon arrival, replaced in the ED.  Pt admitted for LLL PNA.  Treating with IV antibiotics. Well responding on current treatment for pneumonia,  WBC WNL, afebrile, breathing well on O2 supplement.  Pt is hospice eligible, family decided not to treat for Osteomyelitis, but treating pneumonia now then focusing to make pt comfortable as possible. PEG tube feeding resumed and functioning well. Referral sent to [facility] for hospice care at [facility] and [facility] for hospice care,  reviewed SW note.  Pending placement.        INTERVAL HPI/OVERNIGHT EVENTS: no new complaints    MEDICATIONS  (STANDING):  acetaminophen    Suspension .. 650 milliGRAM(s) Oral every 8 hours  dextrose 5%. 1000 milliLiter(s) (60 mL/Hr) IV Continuous <Continuous>  dextrose 5%. 1000 milliLiter(s) (60 mL/Hr) IV Continuous <Continuous>  heparin  Injectable 5000 Unit(s) SubCutaneous every 12 hours  memantine 10 milliGRAM(s) Oral two times a day  meropenem  IVPB 500 milliGRAM(s) IV Intermittent every 12 hours  potassium chloride   Powder 10 milliEquivalent(s) Oral daily  povidone iodine 10% Solution 1 Application(s) Topical daily  simvastatin 10 milliGRAM(s) Oral at bedtime  sodium chloride 0.65% Nasal 1 Spray(s) Both Nostrils every 8 hours    MEDICATIONS  (PRN):  glycopyrrolate Injectable 0.4 milliGRAM(s) IV Push every 6 hours PRN secretions  oxyCODONE    IR 5 milliGRAM(s) Oral every 6 hours PRN Severe Pain (7 - 10) or dyspnea      __________________________________________________  REVIEW OF SYSTEMS:    pt is non verbal, unobtainable     Vital Signs Last 24 Hrs  T(C): 36.7 (22 Oct 2019 08:00), Max: 36.8 (21 Oct 2019 23:59)  T(F): 98 (22 Oct 2019 08:00), Max: 98.3 (21 Oct 2019 23:59)  HR: 86 (22 Oct 2019 08:00) (80 - 86)  BP: 119/49 (22 Oct 2019 08:00) (119/49 - 141/60)  BP(mean): --  RR: 17 (22 Oct 2019 08:00) (17 - 18)  SpO2: 98% (22 Oct 2019 08:00) (98% - 99%)    ________________________________________________  PHYSICAL EXAM:  GENERAL: NAD  HEENT:  closed eyes   NECK : supple  CHEST/LUNG: Clear to auscultation bilaterally with good air entry , on Oxygen via N-C   HEART: S1 S2  regular; no murmurs, gallops or rubs  ABDOMEN: Soft, Nontender, Nondistended; Bowel sounds present, (+) PEG tube   EXTREMITIES: no cyanosis; no edema; no calf tenderness  SKIN:  b/l feet- dressing intact, (+) b/l heel boots   NERVOUS SYSTEM:  Awake and alert; Oriented  to place, person and time ; no new deficits    _________________________________________________  LABS:                        9.3    9.53  )-----------( 109      ( 22 Oct 2019 08:18 )             30.3     10-22    142  |  111<H>  |  20<H>  ----------------------------<  90  4.6   |  26  |  0.63    Ca    7.9<L>      22 Oct 2019 08:18          CAPILLARY BLOOD GLUCOSE            RADIOLOGY & ADDITIONAL TESTS:    Imaging Personally Reviewed:  NO    Consultant(s) Notes Reviewed:   YES    Care Discussed with Consultants :Nephro, podiatry     Plan of care was discussed with patient and /or primary care giver; all questions and concerns were addressed and care was aligned with patient's wishes.

## 2019-10-22 NOTE — PROGRESS NOTE ADULT - PROBLEM SELECTOR PLAN 5
resolved  monitor BMP
advanced, nonverbal, bedbound. No behavioral issues currently. FAST 7d. Hospice eligible, son and daughter in agreement. DNR/DNI
ID states most likely will not treat her osteo given her age as she is more likely to suffer morbity from abxs rather than her osteo given her age and that she is bedbound.  Continue with Meropenem (day 2)  Continue with wound care  Podiatry following  Dr. Rick (ID) following
Xray noted above  COLLIN noted above  Patient will need PICC for IV abx, palliative care to discuss with family  Continue with Meropenem (day 2)  Continue with wound care  Podiatry following  Dr. Rick (ID) following
Xray noted above  Continue with Meropenem (day 1)  Follow up COLLIN/PVR  Podiatry following  Dr. Rick (ID) following
came in with albumin 1.8 , muscle wasting to b/l lower extremities   on PEG tube feeding -- ABD xray done in 10.16, resumed feeding and tolerating well.
replaced in AM - K : 3.2  BMP in AM
advanced, nonverbal, bedbound. No behavioral issues currently. FAST 7d. Hospice eligible, son and daughter in agreement. DNR/DNI  -oxycodone 5mg PPEG q4h prn dyspnea  -glycopyrrolate prn secretions

## 2019-10-22 NOTE — PROGRESS NOTE ADULT - PROBLEM SELECTOR PROBLEM 5
Dementia
Foot ulcer
Protein calorie malnutrition
Hypokalemia
Dementia
Hypernatremia

## 2019-10-22 NOTE — PROGRESS NOTE ADULT - PROBLEM SELECTOR PLAN 7
advanced   unable to arouse pt  supportive care  hospice eligible
BP is stable  Patient not on any BP medications
c/w heparin sc for DVT ppx
came in with albumin 1.8 , muscle wasting to b/l lower extremities   on PEG tube feeding -- ABD xray done in 10.16, resumed feeding and tolerating well

## 2019-10-22 NOTE — PROGRESS NOTE ADULT - PROBLEM SELECTOR PROBLEM 7
Dementia
HTN (hypertension)
Need for prophylactic measure
Protein calorie malnutrition

## 2019-10-22 NOTE — PROGRESS NOTE ADULT - PROBLEM SELECTOR PROBLEM 9
Goals of care, counseling/discussion
Advance directive discussed with patient
Discharge planning issues
Goals of care, counseling/discussion

## 2019-10-22 NOTE — PROGRESS NOTE ADULT - PROBLEM SELECTOR PROBLEM 4
Osteomyelitis
Feeding tube dysfunction
Thrombocytopenia
Hypernatremia
Osteomyelitis
Functional quadriplegia

## 2019-10-22 NOTE — PROGRESS NOTE ADULT - PROBLEM SELECTOR PROBLEM 10
Discharge planning issues
Need for prophylactic measure
Discharge planning issues

## 2019-10-22 NOTE — PROGRESS NOTE ADULT - PROBLEM SELECTOR PLAN 10
hospice eligible  SW is following pt for placement
Continue with SQ heparin
likely hospice care with [facility]

## 2019-10-23 ENCOUNTER — TRANSCRIPTION ENCOUNTER (OUTPATIENT)
Age: 84
End: 2019-10-23

## 2019-10-23 VITALS
DIASTOLIC BLOOD PRESSURE: 64 MMHG | SYSTOLIC BLOOD PRESSURE: 149 MMHG | TEMPERATURE: 98 F | RESPIRATION RATE: 16 BRPM | HEART RATE: 91 BPM | OXYGEN SATURATION: 100 %

## 2019-10-23 PROCEDURE — 74018 RADEX ABDOMEN 1 VIEW: CPT | Mod: 26

## 2019-10-23 RX ORDER — NYSTATIN CREAM 100000 [USP'U]/G
1 CREAM TOPICAL
Qty: 0 | Refills: 0 | DISCHARGE
Start: 2019-10-23

## 2019-10-23 RX ORDER — NYSTATIN CREAM 100000 [USP'U]/G
1 CREAM TOPICAL
Refills: 0 | Status: DISCONTINUED | OUTPATIENT
Start: 2019-10-23 | End: 2019-10-23

## 2019-10-23 RX ADMIN — Medication 10 MILLIEQUIVALENT(S): at 12:22

## 2019-10-23 RX ADMIN — Medication 650 MILLIGRAM(S): at 05:26

## 2019-10-23 RX ADMIN — HEPARIN SODIUM 5000 UNIT(S): 5000 INJECTION INTRAVENOUS; SUBCUTANEOUS at 05:28

## 2019-10-23 RX ADMIN — Medication 1 APPLICATION(S): at 12:22

## 2019-10-23 RX ADMIN — MEROPENEM 100 MILLIGRAM(S): 1 INJECTION INTRAVENOUS at 05:29

## 2019-10-23 RX ADMIN — Medication 1 SPRAY(S): at 05:29

## 2019-10-23 RX ADMIN — Medication 650 MILLIGRAM(S): at 07:00

## 2019-10-23 NOTE — PROGRESS NOTE ADULT - REASON FOR ADMISSION
respiratory distress, fever

## 2019-10-23 NOTE — DISCHARGE NOTE NURSING/CASE MANAGEMENT/SOCIAL WORK - PATIENT PORTAL LINK FT
You can access the FollowMyHealth Patient Portal offered by [facility] by registering at the following website: [email or web address]. By joining [facility]’s FollowMyHealth portal, you will also be able to view your health information using other applications (apps) compatible with our system.

## 2019-10-23 NOTE — PROGRESS NOTE ADULT - SUBJECTIVE AND OBJECTIVE BOX
HPI:  89 year old female patient, bedbound, non verbal AAO x0 at baseline from [facility], with PMH significant for HTN, DM, HLD and dementia is sent from NH due to fever of 102F, congestion and oxygen saturation of 81%. On arrival, patient was noted to have rectal temperature of 105F, , O2 sat of 88%. She was placed on non-rebreather which improved SPO2 to 95%. In ED, it was noted that G tube was not draining and was erythematous no purulent discharge was noted. G-tube was replaced by ED provider. Her UA was negative, chest Xray showed: new left sided infiltrate at base.  As per charts, patient was being treated with zosyn for osteomyelitis of left foot for last 2 days. However, she continued to spike fever.     History was limited, patient's family was at bedside. Had detailed discussion about GOC, she is DNR/DNI, no pressor support or central line. (12 Oct 2019 12:37)      Patient is a 89y old  Female who presents with a chief complaint of respiratory distress, fever (22 Oct 2019 12:45)      INTERVAL HPI/OVERNIGHT EVENTS:  T(C): 36.2 (10-23-19 @ 07:52), Max: 36.8 (10-23-19 @ 00:50)  HR: 82 (10-23-19 @ 07:52) (78 - 87)  BP: 163/63 (10-23-19 @ 07:52) (122/52 - 163/63)  RR: 17 (10-23-19 @ 07:52) (17 - 18)  SpO2: 97% (10-23-19 @ 07:52) (96% - 97%)  Wt(kg): --  I&O's Summary    22 Oct 2019 07:01  -  23 Oct 2019 07:00  --------------------------------------------------------  IN: 0 mL / OUT: 1880 mL / NET: -1880 mL        REVIEW OF SYSTEMS: denies fever, chills, SOB, palpitations, chest pain, abdominal pain, nausea, vomitting, diarrhea, constipation, dizziness    MEDICATIONS  (STANDING):  acetaminophen    Suspension .. 650 milliGRAM(s) Oral every 8 hours  dextrose 5%. 1000 milliLiter(s) (60 mL/Hr) IV Continuous <Continuous>  dextrose 5%. 1000 milliLiter(s) (60 mL/Hr) IV Continuous <Continuous>  heparin  Injectable 5000 Unit(s) SubCutaneous every 12 hours  meropenem  IVPB 500 milliGRAM(s) IV Intermittent every 12 hours  nystatin Powder 1 Application(s) Topical two times a day  potassium chloride   Powder 10 milliEquivalent(s) Oral daily  povidone iodine 10% Solution 1 Application(s) Topical daily  simvastatin 10 milliGRAM(s) Oral at bedtime  sodium chloride 0.65% Nasal 1 Spray(s) Both Nostrils every 8 hours    MEDICATIONS  (PRN):  glycopyrrolate Injectable 0.4 milliGRAM(s) IV Push every 6 hours PRN secretions  oxyCODONE    IR 5 milliGRAM(s) Oral every 6 hours PRN Severe Pain (7 - 10) or dyspnea      PHYSICAL EXAM:  GENERAL: NAD, well-groomed, well-developed  HEAD:  Atraumatic, Normocephalic  EYES: EOMI, PERRLA, conjunctiva and sclera clear  ENMT: No tonsillar erythema, exudates, or enlargement; Moist mucous membranes, Good dentition, No lesions  NECK: Supple, No JVD, Normal thyroid  NERVOUS SYSTEM:  Alert & Oriented X3, Good concentration; Motor Strength 5/5 B/L upper and lower extremities; DTRs 2+ intact and symmetric  CHEST/LUNG: Clear to percussion bilaterally; No rales, rhonchi, wheezing, or rubs  HEART: Regular rate and rhythm; No murmurs, rubs, or gallops  ABDOMEN: Soft, Nontender, Nondistended; Bowel sounds present  EXTREMITIES:  2+ Peripheral Pulses, No clubbing, cyanosis, or edema  LYMPH: No lymphadenopathy noted  SKIN: No rashes or lesions  LABS:                        9.3    9.53  )-----------( 109      ( 22 Oct 2019 08:18 )             30.3     10-22    142  |  111<H>  |  20<H>  ----------------------------<  90  4.6   |  26  |  0.63    Ca    7.9<L>      22 Oct 2019 08:18          CAPILLARY BLOOD GLUCOSE

## 2019-10-23 NOTE — PROGRESS NOTE ADULT - PROVIDER SPECIALTY LIST ADULT
Gastroenterology
Heme/Onc
Infectious Disease
Internal Medicine
Nephrology
Palliative Care
Podiatry
Internal Medicine
Podiatry
Internal Medicine
Gastroenterology
Palliative Care
Internal Medicine

## 2019-10-23 NOTE — PROGRESS NOTE ADULT - ASSESSMENT
_________________________________________________________________________________________  ========>>  M E D I C A L   A T T E N D I N G    F O L L O W  U P  N O T E  <<=========  -----------------------------------------------------------------------------------------------------    - Patient seen and examined by me earlier today.  ( covering Dr Conway today)   - In summary,  MILTON GUIDRY is a 89y year old woman who originally presented with SOB, fever   - Patient today overall doing ok, comfortable, no new events reported     ==================>> REVIEW OF SYSTEM <<=================    limited ROS due to status of pt     ==================>> PHYSICAL EXAM <<=================    GEN: porly responsive, moans at times   HEENT: NCAT, PERRL  CVS: S1S2 , regular   PULM: CTA B/L,  limited as not taking deep breath   ABD.: soft. non tender, non distended,  bowel sounds present, + peg in place   Extrem: intact pulses , no edema , contractured      + jackson in place and draining       ==================>> MEDICATIONS <<====================    MEDICATIONS  (STANDING):  meropenem  IVPB 500 milliGRAM(s) IV Intermittent every 12 hours  sodium chloride 0.9%. 1000 milliLiter(s) (75 mL/Hr) IV Continuous <Continuous>    ==================>> VITAL SIGNS <<==================    T(C): 36.8 (10-13-19 @ 16:07), Max: 36.9 (10-13-19 @ 00:22)  HR: 61 (10-13-19 @ 16:07) (61 - 69)  BP: 110/51 (10-13-19 @ 16:07) (110/51 - 120/51)  RR: 20 (10-13-19 @ 16:07) (19 - 20)  SpO2: 98% (10-13-19 @ 16:07) (98% - 100%)     I&O's Summary    13 Oct 2019 07:01  -  13 Oct 2019 16:58  --------------------------------------------------------  IN: 0 mL / OUT: 550 mL / NET: -550 mL     ==================>> LAB AND IMAGING <<==================                        9.6    12.10 )-----------( 42       ( 13 Oct 2019 06:00 )             33.9     WBC count:   12.10 <<== ,  12.42 <<==        10-13    151<H>  |  126<H>  |  105<H>  ----------------------------<  164<H>  4.4   |  17<L>  |  1.64<H>    Sodium:   151  <==, 155  <==, 162  <==, 160  <==, 161  <==    Creatinine:  1.64  <<==, 1.78  <<==, 1.92  <<==, 1.79  <<==, 2.06  <<==    Ca    6.9<L>      13 Oct 2019 14:35  Phos  4.6     10-13  Mg     3.6     10-13    TPro  5.8<L>  /  Alb  2.0<L>  /  TBili  0.3  /  DBili  x   /  AST  41<H>  /  ALT  40  /  AlkPhos  49  10    PT/INR - ( 13 Oct 2019 06:00 )   PT: 13.4 sec;   INR: 1.20 ratio    PTT - ( 12 Oct 2019 10:03 )  PTT:29.9 sec               Urinalysis Basic - ( 12 Oct 2019 10:20 )  Color: Yellow / Appearance: Clear / S.010 / pH: x  Gluc: x / Ketone: Negative  / Bili: Negative / Urobili: Negative   Blood: x / Protein: 30 mg/dL / Nitrite: Negative   Leuk Esterase: Small / RBC: 0-2 /HPF / WBC 6-10 /HPF   Sq Epi: x / Non Sq Epi: x / Bacteria: Trace /HPF    Lipid profile:  (10-13-19)     Total: 90     LDL  : 42     HDL  :18     TG   :149     HgA1C: 6.9  (10-13-19)            ___________________________________________________________________________________  ===============>>  A S S E S S M E N T   A N D   P L A N <<===============  ------------------------------------------------------------------------------------------    · Assessment	  89 year old female patient, bedbound, non verbal AAO x0 at baseline from [facility], with PMH significant for HTN, DM, HLD and dementia is sent from NH due to fever of 102F, congestion and oxygen saturation of 81%.        Problem/Plan - 1:  ·  Problem: Severe sepsis.     likely due to pneumonia and osteo of foot   - ID following  - on merem   -Follow blood cx and urine cx   -Follow lactic acid   -C/w IVF and water via PEG      Problem/Plan - 2:  ·  Problem: GAYLE and Hypernatremia   -Likely secondary to dehydration and fever   - overall improved   -Free water via Peg  - IVF as above   -nephrologist follow up and mgmt appreciated      Problem/Plan - 3:  ·  Problem:  Feeding tube dysfunction.      -Patient's G tube was not functioning, resolved         -G tube replaced by ED provider  -  feeds as able / tolerated      Problem/Plan - 4:  ·  Problem: Foot ulcer.  likely osteomyelitis   -Podiatry appreciated  - local care  - abx as above      Problem/Plan - 5:  Problem: Dementia.   -Patient has dementia , c/w Namenda      Problem/Plan - 6:  ·  Problem: h/o HTN     hold off to all anti-hypertensives given severe sepsis.   monitor     -GI/DVT Prophylaxis.  - golas of care discussion noted     --------------------------------------------  COMFORT Fu D.O.  ( covering Dr Conway today)   Pager: [phone]
89 year old female with malfunctioning PEG
seen and examined  pt is 89 year has severe dementia bed bound  peg tube  contracted  h/o DUs confused   has foot ulcer for  a whilw  was being seen by wound surgen every week, 3 days ago , i was called that surgeon thinks pt has OM of foot so DOXYcycline i okayed it advised ID consult in NH and advised xray of foot .  next day , pt spiked low grade fever , i ordered zosyn  and again advised ID.  PT is DNR DNI . to day in am I was called that pt had temp of 102  advised to go to hosp.  when pt came to ER  temp went up to 105   also her peg was not working na was high. admitted  iv fluids , vanco, maxipine , flagyl given  now temp is 99.  seen and examined  vs stable now  lungs clear  abd soft bs nml, non tender  peg in palce ( ER fixed peg  xray results pending)  foot in dressing  wwc 12, na 160 bun 102, creat 1.7  pt is not arousable     a/p om of foot  cont IV antibx   d5 iv. if xray of abd ok then free water thru PEG   d/w ID  will get podiatry.  nephro for fluid managment  poor prognosis from long time  now grave  d/w daughter   Palleative
89 year old female with malfunctioning PEG
89y Female NH resident, bedbound and non verbal at baseline with PMH significant for HTN, DM, HLD and dementia a/w sepsis 2/2 Left heel OM v PNA, in setting of GAYLE, hypernatremia.
A/P    PT  BEING  SEEN  FOR HYPERNATREMIA   HER S  NA  YESTERDAY  WAS  148  DOWN  FROM  155,  NO LABS  FROM  TODAY   GETS  FREE   FLUID  VIA PEG   WILL  START  D5W @ 60  CC  /HR   FO R 24  HRS  AND  FOLLOW  SODIUM  LEVELS   HER  BP  IS  OK  TODAY    ON  ABX  AS PER  ID  FOR WOUND L  HEEL,  ALSO  BEING  FOLLOWED  BY  PODIATRY
A/P  GAYLE-  RESOLVED  HYPERNATREMIA-  NO NEW  NUMBERS FROM  TODAY     YESTERDAY SODIUM  WAS  148,  WAS  STARTED ON  D5W   K  WAS LOW AND  WAS  REPLACED  REPEAT  BMP  IN  AM  BP IS ACCEPTABLE
GAYLE-  RESOLVED  HYPERNATREMIA-   SODIUM  145 S/P  D5W CONT FREE WATER.  WILL S/O. THANKS
jose daivd nd examined vsstabel afebrile physical unchanged not responding    na156   xray abd post peg insertion is done  np will f/u results  free water 250 cc q4  bmp, cbc in am  poor prognosis
jose david nd examined  eyes open blinking  not in any distress  vsstable afebrile physical unchanged  abd soft bs nt nd, peg in place  neuro unchaged  severe dementia  hgb 9.2  na 145  a/p clinically OM of foot IV ANTIBX DURATION AS PER ID  BMP CBC EVERY WEEK  podiatry to f/u out pt   tylenol for pain  peg feeding extra water q shift  bmp to check for hydration   watch hgb  going for hospice    d/c planning
jose david nd examined  vsstable  now trying to open eyes  although not responding to verbal stimuli  physical unchaged  abd soft bs nml  na 148, k 3.0  hgb 9.7  palatelets 58 better  a/p cont peg tube free water  supplement K   on iv antibx    foot  OM  on iv antibxs   d/c palnning for hospice now
jose david nd examined  vsstable afebrile  no complaints  no cp or sob or palp.  abd soft bs ntender  cnc unchaged   foot noted   na 155, k3.3  platelets 50  a/p cont same   extra water via peg suppl k  watch paltelets  foot ulcer being f/u by podiatry   no surgery recommended  freq change in position   vit c , znc  on iv antibxs   as pt is 89 yr old  severe dementia  contracted bed bound has poor prognosis   high risk for DUs  poor prognosis
jose david nd examined vsstable afebrile physical unchaged  MS unchanged on Peg feed   no new labs    awaiting for hospice palcement
jose david nd examined vsstable afebrile physical unchanged  on bed comfortable  not in any distress  opens eyes on and off.  severely dementic .      labs noted Na 142  hgb anemic over 9  a/p d/c planning to hospice   GI feed with extra water   frequent change in position  OM antibx  podiatrty  wound care   high risk for developing ulcers
seen and examined  vs stable physical unchanged     cont same managment  peg water  for hospice in long term  
seen and examined vs stable  on O2  not responding to verbal stimuli some grimace for painful stimuli  vsstable afebrile  physical done lungs clear  heart abd okay  peg is in place  na better  craet better   wbc better  blood urine cxs negative sofar   a/p d/w Podiatry  as per him doesnt look like OM  cxs were sent  on maxipine    hyponatrenia better
seen and examined vsstable  not respondind to verbal or painful stimuli but resisting to open eyes  trying feebly to hand  physical unchaged    albs na 151  hgb stable platelets 41  lt foot in dressing  a/p podiatry appreciated bx graeme   s to jacqueline  ID, nephro to f/u  Na 151  extra free water via peg   palleative care
jose david nd examimed  vs stable afebrile physical uncahged  not in any distress eyes open  advanced dementia  physical unchanged   a/p OM of foot cont antibx   na check tmrw  for hospice care   d/c planning
89 year old woman with SOB, Functional quadriplegia, protein calorie malnutrition,  PNA, Dementia, advance care planning and encounter for palliative care.
89 year old woman with SOB, Functional quadriplegia, protein calorie malnutrition,  PNA, Dementia, advance care planning and encounter for palliative care.
Pneumonia - on D#6 of treatment  Left foot osteomyelitis - will not treat as patient is bedbound and will not benefit from treatment but would likely get side effects.  Fevers - resolved  Leukocytosis - normalized    Plan - cont meropenem 500mgs iv q12hrs till tomorrow then DC all abxs.  DC planning.  Reconsult prn.

## 2019-11-12 PROCEDURE — 74018 RADEX ABDOMEN 1 VIEW: CPT

## 2019-11-12 PROCEDURE — 81001 URINALYSIS AUTO W/SCOPE: CPT

## 2019-11-12 PROCEDURE — 83605 ASSAY OF LACTIC ACID: CPT

## 2019-11-12 PROCEDURE — 86140 C-REACTIVE PROTEIN: CPT

## 2019-11-12 PROCEDURE — 83036 HEMOGLOBIN GLYCOSYLATED A1C: CPT

## 2019-11-12 PROCEDURE — 80048 BASIC METABOLIC PNL TOTAL CA: CPT

## 2019-11-12 PROCEDURE — 82962 GLUCOSE BLOOD TEST: CPT

## 2019-11-12 PROCEDURE — 73620 X-RAY EXAM OF FOOT: CPT

## 2019-11-12 PROCEDURE — 87070 CULTURE OTHR SPECIMN AEROBIC: CPT

## 2019-11-12 PROCEDURE — 96374 THER/PROPH/DIAG INJ IV PUSH: CPT

## 2019-11-12 PROCEDURE — 85027 COMPLETE CBC AUTOMATED: CPT

## 2019-11-12 PROCEDURE — 87186 SC STD MICRODIL/AGAR DIL: CPT

## 2019-11-12 PROCEDURE — 83735 ASSAY OF MAGNESIUM: CPT

## 2019-11-12 PROCEDURE — 85652 RBC SED RATE AUTOMATED: CPT

## 2019-11-12 PROCEDURE — 99291 CRITICAL CARE FIRST HOUR: CPT | Mod: 25

## 2019-11-12 PROCEDURE — 82570 ASSAY OF URINE CREATININE: CPT

## 2019-11-12 PROCEDURE — 84100 ASSAY OF PHOSPHORUS: CPT

## 2019-11-12 PROCEDURE — 80053 COMPREHEN METABOLIC PANEL: CPT

## 2019-11-12 PROCEDURE — 36415 COLL VENOUS BLD VENIPUNCTURE: CPT

## 2019-11-12 PROCEDURE — 82803 BLOOD GASES ANY COMBINATION: CPT

## 2019-11-12 PROCEDURE — 84300 ASSAY OF URINE SODIUM: CPT

## 2019-11-12 PROCEDURE — P9047: CPT

## 2019-11-12 PROCEDURE — 85610 PROTHROMBIN TIME: CPT

## 2019-11-12 PROCEDURE — 80061 LIPID PANEL: CPT

## 2019-11-12 PROCEDURE — 43753 TX GASTRO INTUB W/ASP: CPT

## 2019-11-12 PROCEDURE — 87086 URINE CULTURE/COLONY COUNT: CPT

## 2019-11-12 PROCEDURE — 49465 FLUORO EXAM OF G/COLON TUBE: CPT

## 2019-11-12 PROCEDURE — 85730 THROMBOPLASTIN TIME PARTIAL: CPT

## 2019-11-12 PROCEDURE — 93923 UPR/LXTR ART STDY 3+ LVLS: CPT

## 2019-11-12 PROCEDURE — 96375 TX/PRO/DX INJ NEW DRUG ADDON: CPT

## 2019-11-12 PROCEDURE — 93005 ELECTROCARDIOGRAM TRACING: CPT

## 2019-11-12 PROCEDURE — 87040 BLOOD CULTURE FOR BACTERIA: CPT

## 2019-11-12 PROCEDURE — 71045 X-RAY EXAM CHEST 1 VIEW: CPT

## 2019-12-09 NOTE — ED ADULT NURSE NOTE - SEPSIS REFERENCE DATA CRITERIA 2
Product: Albuterol HFA  Lot Number: [id number]  MFG: [id number]  NDC: 0173--0682-24  Expiration: 1/2021  Time Given: 10:06 AM  Dose Given: 4 Puffs  Administered by Inez Andre RN   Site Administered: Inhalation  Clinic stock dispensed.       > 2

## 2021-02-09 NOTE — ED PROVIDER NOTE - MUSCULOSKELETAL, MLM
Pt wanted to know which GI doctor Dr. Duane Muscat referred her to. Spine appears normal, range of motion is not limited, no muscle or joint tenderness

## 2021-05-28 NOTE — PROGRESS NOTE ADULT - PROBLEM/PLAN-1
Pt states that she was told that she needed labs done to get her med refill but that a 30 day refill would be called into pharmacy but pt was at pharmacy and it is not there, Can we fill for pt? DISPLAY PLAN FREE TEXT

## 2021-07-27 NOTE — PROGRESS NOTE ADULT - SUBJECTIVE AND OBJECTIVE BOX
Patient is a 89y Female with  HYPERNATREMIA   SEEN EARLIER  TODAY,   WAS  IN  NO  ACUTE  DISTRESS  NON  COMMUNICATIVE   PEG  FEEDING  IN  PROGRESS    No Known Allergies    Hospital Medications:   MEDICATIONS  (STANDING):  acetaminophen    Suspension .. 650 milliGRAM(s) Oral every 8 hours  dextrose 5%. 1000 milliLiter(s) (60 mL/Hr) IV Continuous <Continuous>  dextrose 5%. 1000 milliLiter(s) (60 mL/Hr) IV Continuous <Continuous>  heparin  Injectable 5000 Unit(s) SubCutaneous every 12 hours  memantine 10 milliGRAM(s) Oral two times a day  meropenem  IVPB 500 milliGRAM(s) IV Intermittent every 12 hours  potassium chloride   Powder 10 milliEquivalent(s) Oral daily  simvastatin 10 milliGRAM(s) Oral at bedtime  sodium chloride 0.65% Nasal 1 Spray(s) Both Nostrils every 8 hours    REVIEW OF SYSTEMS:  COULD NOT  BE  PERFORMED,  PT  NON  VERBAL  VITALS:  T(F): 98.4 (10-19-19 @ 08:10), Max: 98.4 (10-18-19 @ 23:40)  HR: 77 (10-19-19 @ 08:10)  BP: 116/52 (10-19-19 @ 08:10)  RR: 18 (10-19-19 @ 08:10)  SpO2: 95% (10-19-19 @ 08:10)  Wt(kg): --    10-18 @ 07:01  -  10-19 @ 07:00  --------------------------------------------------------  IN: 0 mL / OUT: 840 mL / NET: -840 mL    10-19 @ 07:01  -  10-19 @ 15:28  --------------------------------------------------------  IN: 0 mL / OUT: 500 mL / NET: -500 mL        PHYSICAL EXAM:  Constitutional: NAD  HEENT: PINK  Neck: No JVD  Respiratory: CTAB, no wheezes, rales or rhonchi  Cardiovascular: S1, S2, RRR  Gastrointestinal: BS+, soft, NT/ND, PEG IN PLACE  Extremities:  No peripheral edema,  HAS  CONTRACTURES  LOWER  EXT  HAS  DRESSING IN  PLACE L  HEEL  Neurological: NON  VERBAL,  DOESNT  FOLLOW  : [email or web address]  PLACE  HAS  ABOUT  350CC OF  DARK  URINE  IN  BAG         LABS:  10-18    148<H>  |  117<H>  |  36<H>  ----------------------------<  140<H>  3.0<L>   |  25  |  0.74    Ca    7.3<L>      18 Oct 2019 07:27      Creatinine Trend: 0.74 <--, 0.77 <--, 0.94 <--, 1.10 <--, 1.46 <--, 1.48 <--, 1.64 <--, 1.78 <--, 1.92 <--                        9.7    7.94  )-----------( 58       ( 18 Oct 2019 07:27 )             31.7     Urine Studies:    Sodium, Random Urine: 49 mmol/L (10-12 @ 20:26)  Creatinine, Random Urine: 70 mg/dL (10-12 @ 20:26)    RADIOLOGY & ADDITIONAL STUDIES:

## 2023-02-06 NOTE — ED ADULT NURSE NOTE - CADM POA CENTRAL LINE
No PRINCIPAL DISCHARGE DIAGNOSIS  Diagnosis: Altered mental status  Assessment and Plan of Treatment: You were admited in the hospital for altered mental status. You were seen by the neurologist. Continue taking keppra and vimpat upon discharge.      SECONDARY DISCHARGE DIAGNOSES  Diagnosis: Compartment syndrome, nontraumatic  Assessment and Plan of Treatment: You had compartment syndrome and had a fasciotomy performed. You are to follow up with plastic surgeon Dr. Dow for possible closure vs. skin graft.        PRINCIPAL DISCHARGE DIAGNOSIS  Diagnosis: Altered mental status  Assessment and Plan of Treatment: You were admited in the hospital for altered mental status. You were seen by the neurologist. Continue taking keppra and vimpat upon discharge.      SECONDARY DISCHARGE DIAGNOSES  Diagnosis: Compartment syndrome, nontraumatic  Assessment and Plan of Treatment: You had compartment syndrome and had a fasciotomy performed. You are to follow up with plastic surgeon Dr. Dow for possible closure vs. skin graft.       Diagnosis: Deep vein thrombosis (DVT) of lower extremity  Assessment and Plan of Treatment: Continue full dose lovenox upon discharge.

## 2023-02-27 NOTE — ED ADULT NURSE NOTE - NSFALLRSKOUTCOME_ED_ALL_ED
Fall with Harm Risk Azelaic Acid Counseling: Patient counseled that medicine may cause skin irritation and to avoid applying near the eyes.  In the event of skin irritation, the patient was advised to reduce the amount of the drug applied or use it less frequently.   The patient verbalized understanding of the proper use and possible adverse effects of azelaic acid.  All of the patient's questions and concerns were addressed.

## 2025-03-27 NOTE — PROGRESS NOTE ADULT - PROBLEM/PLAN-3
Call to Dr. Torres's office and spoke to Hanna to obtain office fax number. - [phone]    Clearance letter created and faxed.     Now awaiting response     Dr. Kiel Torres MD  [facility] [address] [facility] [address] [address]   P [phone]    F [phone]  
DISPLAY PLAN FREE TEXT